# Patient Record
Sex: FEMALE | Race: BLACK OR AFRICAN AMERICAN | NOT HISPANIC OR LATINO | URBAN - METROPOLITAN AREA
[De-identification: names, ages, dates, MRNs, and addresses within clinical notes are randomized per-mention and may not be internally consistent; named-entity substitution may affect disease eponyms.]

---

## 2020-11-05 ENCOUNTER — INPATIENT (INPATIENT)
Facility: HOSPITAL | Age: 80
LOS: 25 days | Discharge: HOME HEALTH | End: 2020-12-01
Attending: INTERNAL MEDICINE | Admitting: INTERNAL MEDICINE
Payer: MEDICARE

## 2020-11-05 VITALS
SYSTOLIC BLOOD PRESSURE: 146 MMHG | OXYGEN SATURATION: 95 % | DIASTOLIC BLOOD PRESSURE: 79 MMHG | RESPIRATION RATE: 16 BRPM | WEIGHT: 110.01 LBS | TEMPERATURE: 98 F | HEART RATE: 105 BPM | HEIGHT: 64 IN

## 2020-11-05 DIAGNOSIS — I21.4 NON-ST ELEVATION (NSTEMI) MYOCARDIAL INFARCTION: ICD-10-CM

## 2020-11-05 DIAGNOSIS — I10 ESSENTIAL (PRIMARY) HYPERTENSION: ICD-10-CM

## 2020-11-05 DIAGNOSIS — R41.82 ALTERED MENTAL STATUS, UNSPECIFIED: ICD-10-CM

## 2020-11-05 DIAGNOSIS — E78.5 HYPERLIPIDEMIA, UNSPECIFIED: ICD-10-CM

## 2020-11-05 DIAGNOSIS — F17.210 NICOTINE DEPENDENCE, CIGARETTES, UNCOMPLICATED: ICD-10-CM

## 2020-11-05 DIAGNOSIS — E11.9 TYPE 2 DIABETES MELLITUS WITHOUT COMPLICATIONS: ICD-10-CM

## 2020-11-05 DIAGNOSIS — I50.21 ACUTE SYSTOLIC (CONGESTIVE) HEART FAILURE: ICD-10-CM

## 2020-11-05 DIAGNOSIS — I63.9 CEREBRAL INFARCTION, UNSPECIFIED: ICD-10-CM

## 2020-11-05 DIAGNOSIS — G93.41 METABOLIC ENCEPHALOPATHY: ICD-10-CM

## 2020-11-05 DIAGNOSIS — I11.0 HYPERTENSIVE HEART DISEASE WITH HEART FAILURE: ICD-10-CM

## 2020-11-05 DIAGNOSIS — F03.90 UNSPECIFIED DEMENTIA WITHOUT BEHAVIORAL DISTURBANCE: ICD-10-CM

## 2020-11-05 DIAGNOSIS — E04.2 NONTOXIC MULTINODULAR GOITER: ICD-10-CM

## 2020-11-05 DIAGNOSIS — I44.7 LEFT BUNDLE-BRANCH BLOCK, UNSPECIFIED: ICD-10-CM

## 2020-11-05 DIAGNOSIS — E44.0 MODERATE PROTEIN-CALORIE MALNUTRITION: ICD-10-CM

## 2020-11-05 DIAGNOSIS — D64.9 ANEMIA, UNSPECIFIED: ICD-10-CM

## 2020-11-05 LAB
ALBUMIN SERPL ELPH-MCNC: 4 G/DL — SIGNIFICANT CHANGE UP (ref 3.3–5)
ALP SERPL-CCNC: 55 U/L — SIGNIFICANT CHANGE UP (ref 40–120)
ALT FLD-CCNC: 25 U/L — SIGNIFICANT CHANGE UP (ref 12–78)
ANION GAP SERPL CALC-SCNC: 9 MMOL/L — SIGNIFICANT CHANGE UP (ref 5–17)
APPEARANCE UR: CLEAR — SIGNIFICANT CHANGE UP
APTT BLD: 25.3 SEC — LOW (ref 27.5–35.5)
APTT BLD: 69.4 SEC — HIGH (ref 27.5–35.5)
AST SERPL-CCNC: 114 U/L — HIGH (ref 15–37)
BACTERIA # UR AUTO: ABNORMAL
BASOPHILS # BLD AUTO: 0.07 K/UL — SIGNIFICANT CHANGE UP (ref 0–0.2)
BASOPHILS NFR BLD AUTO: 1.1 % — SIGNIFICANT CHANGE UP (ref 0–2)
BILIRUB SERPL-MCNC: 0.8 MG/DL — SIGNIFICANT CHANGE UP (ref 0.2–1.2)
BILIRUB UR-MCNC: NEGATIVE — SIGNIFICANT CHANGE UP
BUN SERPL-MCNC: 16 MG/DL — SIGNIFICANT CHANGE UP (ref 7–23)
CALCIUM SERPL-MCNC: 9.3 MG/DL — SIGNIFICANT CHANGE UP (ref 8.5–10.1)
CHLORIDE SERPL-SCNC: 106 MMOL/L — SIGNIFICANT CHANGE UP (ref 96–108)
CK MB BLD-MCNC: 2.3 % — SIGNIFICANT CHANGE UP (ref 0–3.5)
CK MB CFR SERPL CALC: 14.1 NG/ML — HIGH (ref 0.5–3.6)
CK SERPL-CCNC: 623 U/L — HIGH (ref 26–192)
CO2 SERPL-SCNC: 25 MMOL/L — SIGNIFICANT CHANGE UP (ref 22–31)
COLOR SPEC: YELLOW — SIGNIFICANT CHANGE UP
CREAT SERPL-MCNC: 0.94 MG/DL — SIGNIFICANT CHANGE UP (ref 0.5–1.3)
DIFF PNL FLD: ABNORMAL
EOSINOPHIL # BLD AUTO: 0 K/UL — SIGNIFICANT CHANGE UP (ref 0–0.5)
EOSINOPHIL NFR BLD AUTO: 0 % — SIGNIFICANT CHANGE UP (ref 0–6)
EPI CELLS # UR: SIGNIFICANT CHANGE UP
GLUCOSE SERPL-MCNC: 106 MG/DL — HIGH (ref 70–99)
GLUCOSE UR QL: NEGATIVE MG/DL — SIGNIFICANT CHANGE UP
HCT VFR BLD CALC: 31.6 % — LOW (ref 34.5–45)
HCT VFR BLD CALC: 36.3 % — SIGNIFICANT CHANGE UP (ref 34.5–45)
HGB BLD-MCNC: 11.3 G/DL — LOW (ref 11.5–15.5)
HGB BLD-MCNC: 9.8 G/DL — LOW (ref 11.5–15.5)
IMM GRANULOCYTES NFR BLD AUTO: 0.3 % — SIGNIFICANT CHANGE UP (ref 0–1.5)
INR BLD: 1.14 RATIO — SIGNIFICANT CHANGE UP (ref 0.88–1.16)
KETONES UR-MCNC: ABNORMAL
LEUKOCYTE ESTERASE UR-ACNC: ABNORMAL
LYMPHOCYTES # BLD AUTO: 1.44 K/UL — SIGNIFICANT CHANGE UP (ref 1–3.3)
LYMPHOCYTES # BLD AUTO: 22 % — SIGNIFICANT CHANGE UP (ref 13–44)
MCHC RBC-ENTMCNC: 24.2 PG — LOW (ref 27–34)
MCHC RBC-ENTMCNC: 24.3 PG — LOW (ref 27–34)
MCHC RBC-ENTMCNC: 31 GM/DL — LOW (ref 32–36)
MCHC RBC-ENTMCNC: 31.1 GM/DL — LOW (ref 32–36)
MCV RBC AUTO: 77.9 FL — LOW (ref 80–100)
MCV RBC AUTO: 78.2 FL — LOW (ref 80–100)
MONOCYTES # BLD AUTO: 0.55 K/UL — SIGNIFICANT CHANGE UP (ref 0–0.9)
MONOCYTES NFR BLD AUTO: 8.4 % — SIGNIFICANT CHANGE UP (ref 2–14)
NEUTROPHILS # BLD AUTO: 4.46 K/UL — SIGNIFICANT CHANGE UP (ref 1.8–7.4)
NEUTROPHILS NFR BLD AUTO: 68.2 % — SIGNIFICANT CHANGE UP (ref 43–77)
NITRITE UR-MCNC: NEGATIVE — SIGNIFICANT CHANGE UP
NRBC # BLD: 0 /100 WBCS — SIGNIFICANT CHANGE UP (ref 0–0)
NRBC # BLD: 0 /100 WBCS — SIGNIFICANT CHANGE UP (ref 0–0)
PH UR: 5 — SIGNIFICANT CHANGE UP (ref 5–8)
PLATELET # BLD AUTO: 159 K/UL — SIGNIFICANT CHANGE UP (ref 150–400)
PLATELET # BLD AUTO: 182 K/UL — SIGNIFICANT CHANGE UP (ref 150–400)
POTASSIUM SERPL-MCNC: 4.1 MMOL/L — SIGNIFICANT CHANGE UP (ref 3.5–5.3)
POTASSIUM SERPL-SCNC: 4.1 MMOL/L — SIGNIFICANT CHANGE UP (ref 3.5–5.3)
PROT SERPL-MCNC: 7.2 GM/DL — SIGNIFICANT CHANGE UP (ref 6–8.3)
PROT UR-MCNC: 30 MG/DL
PROTHROM AB SERPL-ACNC: 13.1 SEC — SIGNIFICANT CHANGE UP (ref 10.6–13.6)
RBC # BLD: 4.04 M/UL — SIGNIFICANT CHANGE UP (ref 3.8–5.2)
RBC # BLD: 4.66 M/UL — SIGNIFICANT CHANGE UP (ref 3.8–5.2)
RBC # FLD: 13.7 % — SIGNIFICANT CHANGE UP (ref 10.3–14.5)
RBC # FLD: 13.9 % — SIGNIFICANT CHANGE UP (ref 10.3–14.5)
RBC CASTS # UR COMP ASSIST: SIGNIFICANT CHANGE UP /HPF (ref 0–4)
SARS-COV-2 RNA SPEC QL NAA+PROBE: SIGNIFICANT CHANGE UP
SODIUM SERPL-SCNC: 140 MMOL/L — SIGNIFICANT CHANGE UP (ref 135–145)
SP GR SPEC: 1.02 — SIGNIFICANT CHANGE UP (ref 1.01–1.02)
TROPONIN I SERPL-MCNC: 24 NG/ML — HIGH (ref 0.01–0.04)
TROPONIN I SERPL-MCNC: 24.2 NG/ML — HIGH (ref 0.01–0.04)
TROPONIN I SERPL-MCNC: 27.2 NG/ML — HIGH (ref 0.01–0.04)
URATE CRY FLD QL MICRO: ABNORMAL
UROBILINOGEN FLD QL: 1 MG/DL
WBC # BLD: 5.86 K/UL — SIGNIFICANT CHANGE UP (ref 3.8–10.5)
WBC # BLD: 6.54 K/UL — SIGNIFICANT CHANGE UP (ref 3.8–10.5)
WBC # FLD AUTO: 5.86 K/UL — SIGNIFICANT CHANGE UP (ref 3.8–10.5)
WBC # FLD AUTO: 6.54 K/UL — SIGNIFICANT CHANGE UP (ref 3.8–10.5)
WBC UR QL: SIGNIFICANT CHANGE UP

## 2020-11-05 PROCEDURE — 99285 EMERGENCY DEPT VISIT HI MDM: CPT

## 2020-11-05 PROCEDURE — 71045 X-RAY EXAM CHEST 1 VIEW: CPT | Mod: 26

## 2020-11-05 PROCEDURE — 99223 1ST HOSP IP/OBS HIGH 75: CPT

## 2020-11-05 PROCEDURE — 93010 ELECTROCARDIOGRAM REPORT: CPT | Mod: 77

## 2020-11-05 PROCEDURE — 93010 ELECTROCARDIOGRAM REPORT: CPT

## 2020-11-05 PROCEDURE — 70450 CT HEAD/BRAIN W/O DYE: CPT | Mod: 26,MH

## 2020-11-05 PROCEDURE — 93306 TTE W/DOPPLER COMPLETE: CPT | Mod: 26

## 2020-11-05 RX ORDER — ASPIRIN/CALCIUM CARB/MAGNESIUM 324 MG
325 TABLET ORAL ONCE
Refills: 0 | Status: DISCONTINUED | OUTPATIENT
Start: 2020-11-05 | End: 2020-11-05

## 2020-11-05 RX ORDER — HEPARIN SODIUM 5000 [USP'U]/ML
2900 INJECTION INTRAVENOUS; SUBCUTANEOUS EVERY 6 HOURS
Refills: 0 | Status: DISCONTINUED | OUTPATIENT
Start: 2020-11-05 | End: 2020-11-05

## 2020-11-05 RX ORDER — ERGOCALCIFEROL 1.25 MG/1
1 CAPSULE ORAL
Qty: 0 | Refills: 0 | DISCHARGE

## 2020-11-05 RX ORDER — CLOPIDOGREL BISULFATE 75 MG/1
300 TABLET, FILM COATED ORAL ONCE
Refills: 0 | Status: COMPLETED | OUTPATIENT
Start: 2020-11-05 | End: 2020-11-05

## 2020-11-05 RX ORDER — FUROSEMIDE 40 MG
40 TABLET ORAL DAILY
Refills: 0 | Status: DISCONTINUED | OUTPATIENT
Start: 2020-11-05 | End: 2020-12-01

## 2020-11-05 RX ORDER — HEPARIN SODIUM 5000 [USP'U]/ML
3200 INJECTION INTRAVENOUS; SUBCUTANEOUS EVERY 6 HOURS
Refills: 0 | Status: DISCONTINUED | OUTPATIENT
Start: 2020-11-05 | End: 2020-11-10

## 2020-11-05 RX ORDER — MEMANTINE HYDROCHLORIDE 10 MG/1
10 TABLET ORAL
Refills: 0 | Status: DISCONTINUED | OUTPATIENT
Start: 2020-11-05 | End: 2020-12-01

## 2020-11-05 RX ORDER — CARVEDILOL PHOSPHATE 80 MG/1
3.12 CAPSULE, EXTENDED RELEASE ORAL EVERY 12 HOURS
Refills: 0 | Status: DISCONTINUED | OUTPATIENT
Start: 2020-11-05 | End: 2020-11-06

## 2020-11-05 RX ORDER — CLOPIDOGREL BISULFATE 75 MG/1
75 TABLET, FILM COATED ORAL DAILY
Refills: 0 | Status: DISCONTINUED | OUTPATIENT
Start: 2020-11-05 | End: 2020-11-10

## 2020-11-05 RX ORDER — ATORVASTATIN CALCIUM 80 MG/1
40 TABLET, FILM COATED ORAL AT BEDTIME
Refills: 0 | Status: DISCONTINUED | OUTPATIENT
Start: 2020-11-05 | End: 2020-12-01

## 2020-11-05 RX ORDER — HEPARIN SODIUM 5000 [USP'U]/ML
2900 INJECTION INTRAVENOUS; SUBCUTANEOUS ONCE
Refills: 0 | Status: COMPLETED | OUTPATIENT
Start: 2020-11-05 | End: 2020-11-05

## 2020-11-05 RX ORDER — DONEPEZIL HYDROCHLORIDE 10 MG/1
5 TABLET, FILM COATED ORAL AT BEDTIME
Refills: 0 | Status: DISCONTINUED | OUTPATIENT
Start: 2020-11-05 | End: 2020-12-01

## 2020-11-05 RX ORDER — HEPARIN SODIUM 5000 [USP'U]/ML
600 INJECTION INTRAVENOUS; SUBCUTANEOUS
Qty: 25000 | Refills: 0 | Status: DISCONTINUED | OUTPATIENT
Start: 2020-11-05 | End: 2020-11-10

## 2020-11-05 RX ORDER — LISINOPRIL 2.5 MG/1
5 TABLET ORAL DAILY
Refills: 0 | Status: DISCONTINUED | OUTPATIENT
Start: 2020-11-05 | End: 2020-11-19

## 2020-11-05 RX ORDER — ASPIRIN/CALCIUM CARB/MAGNESIUM 324 MG
325 TABLET ORAL DAILY
Refills: 0 | Status: DISCONTINUED | OUTPATIENT
Start: 2020-11-05 | End: 2020-11-09

## 2020-11-05 RX ORDER — HEPARIN SODIUM 5000 [USP'U]/ML
INJECTION INTRAVENOUS; SUBCUTANEOUS
Qty: 25000 | Refills: 0 | Status: DISCONTINUED | OUTPATIENT
Start: 2020-11-05 | End: 2020-11-05

## 2020-11-05 RX ADMIN — Medication 1 TABLET(S): at 17:33

## 2020-11-05 RX ADMIN — HEPARIN SODIUM 2900 UNIT(S): 5000 INJECTION INTRAVENOUS; SUBCUTANEOUS at 15:24

## 2020-11-05 RX ADMIN — MEMANTINE HYDROCHLORIDE 10 MILLIGRAM(S): 10 TABLET ORAL at 17:33

## 2020-11-05 RX ADMIN — DONEPEZIL HYDROCHLORIDE 5 MILLIGRAM(S): 10 TABLET, FILM COATED ORAL at 21:20

## 2020-11-05 RX ADMIN — HEPARIN SODIUM 600 UNIT(S)/HR: 5000 INJECTION INTRAVENOUS; SUBCUTANEOUS at 15:25

## 2020-11-05 RX ADMIN — CARVEDILOL PHOSPHATE 3.12 MILLIGRAM(S): 80 CAPSULE, EXTENDED RELEASE ORAL at 17:33

## 2020-11-05 RX ADMIN — Medication 325 MILLIGRAM(S): at 15:29

## 2020-11-05 RX ADMIN — ATORVASTATIN CALCIUM 40 MILLIGRAM(S): 80 TABLET, FILM COATED ORAL at 21:20

## 2020-11-05 RX ADMIN — CLOPIDOGREL BISULFATE 300 MILLIGRAM(S): 75 TABLET, FILM COATED ORAL at 15:29

## 2020-11-05 RX ADMIN — HEPARIN SODIUM 600 UNIT(S)/HR: 5000 INJECTION INTRAVENOUS; SUBCUTANEOUS at 22:30

## 2020-11-05 NOTE — ED PROVIDER NOTE - OBJECTIVE STATEMENT
81yo female with unknown pmh found [place] highway. Pt gave name and . Pt is AOX2, shivering but otherwise deneis any complaints. Police currently attempting to look for family.    No fever/chills, No photophobia/eye pain/changes in vision, No ear pain/sore throat/dysphagia, No chest pain/palpitations, no SOB/cough, no wheeze/stridor, No abdominal pain, No N/V/D, no dysuria/frequency/discharge, No neck/back pain, no rash, no changes in neurological status/function. 81yo female with unknown pmh found walking [place] highway. Pt gave name and . Pt is AOX2, shivering but otherwise deneis any complaints. Police currently attempting to look for family. Pt initially reports she lives with mom, then states she has two sons, but then switches it to daughter.     No fever/chills, No photophobia/eye pain/changes in vision, No ear pain/sore throat/dysphagia, No chest pain/palpitations, no SOB/cough, no wheeze/stridor, No abdominal pain, No N/V/D, no dysuria/frequency/discharge, No neck/back pain, no rash, no changes in neurological status/function.

## 2020-11-05 NOTE — H&P ADULT - ASSESSMENT
80 years old female with history of dementia and Hypertension and diabetes wfound with wandering with cerebrovascular accident seeen and lbbb with very elevated trop

## 2020-11-05 NOTE — ED PROVIDER NOTE - CARE PLAN
Principal Discharge DX:	Altered mental status  Secondary Diagnosis:	CVA (cerebral vascular accident)   Principal Discharge DX:	Altered mental status  Secondary Diagnosis:	CVA (cerebral vascular accident)  Secondary Diagnosis:	Elevated troponin

## 2020-11-05 NOTE — ED ADULT NURSE NOTE - CHPI ED NUR SYMPTOMS NEG
no loss of consciousness/no dizziness/no change in level of consciousness/no fever/no nausea/no blurred vision

## 2020-11-05 NOTE — ED PROVIDER NOTE - CLINICAL SUMMARY MEDICAL DECISION MAKING FREE TEXT BOX
Pt likely with history of dementia. d/w police who were able to get pt's address and in touch with a ? fellow [employer] person who was to come. However no one came. Number in chart not working. Pt unabel to corroborate dementia history. CTH with possible subacute vs chronic stroke, will admit. sherin fraser. Pt likely with history of dementia. d/w police who were able to get pt's address and in touch with a ? fellow Mu-ism person who was to come. However no one came. Number in chart not working. Pt unable to corroborate any history or dementia. EKG initially with LBBB, unknown prior. CTH performed, showing possible subacute vs chronic stroke. Trop added, and noted to be 24. Stat CK and rpt trop ordered. pt comfortable, denies any symptoms or CP. will admit. sherin fraser. Pt likely with history of dementia. d/w police who were able to get pt's address and in touch with a ? fellow Mandaen person who was to come. However no one came. Number in chart not working. Pt unable to corroborate any history or dementia. EKG initially with LBBB, unknown prior. CTH performed, showing possible subacute vs chronic stroke. Trop added, and noted to be 24. Stat CK and rpt trop ordered similar, rpt EKG stable. pt comfortable, no pain, denies any symptoms or CP. will admit. sherin fraser.

## 2020-11-05 NOTE — H&P ADULT - NSHPPHYSICALEXAM_GEN_ALL_CORE
ICU Vital Signs Last 24 Hrs  T(C): 36.6 (05 Nov 2020 11:26), Max: 36.6 (05 Nov 2020 11:26)  T(F): 97.9 (05 Nov 2020 11:26), Max: 97.9 (05 Nov 2020 11:26)  HR: 89 (05 Nov 2020 11:26) (89 - 105)  BP: 117/61 (05 Nov 2020 11:26) (117/61 - 146/79)  BP(mean): --  ABP: --  ABP(mean): --  RR: 17 (05 Nov 2020 11:26) (16 - 17)  SpO2: 100% (05 Nov 2020 11:26) (95% - 100%)  GENERAL: NAD well-developed  HEAD:  Atraumatic, Normocephalic  EYES: EOMI, PERRLA, conjunctiva and sclera clear  ENMT: No tonsillar erythema, exudates, or enlargement; Moist mucous membranes, Good dentition, No lesions  NECK: Supple, No JVD, Normal thyroid  NERVOUS SYSTEM:  Alert & Oriented X3, Good concentration; Motor Strength 5/5 B/L upper and lower extremities; DTRs 2+ intact and symmetric  CHEST/LUNG: Clear to percussion bilaterally; No rales, rhonchi, wheezing, or rubs  HEART: Regular rate and rhythm; No murmurs, rubs, or gallops  ABDOMEN: Soft, Nontender, Nondistended; Bowel sounds present  EXTREMITIES:  2+ Peripheral Pulses, No clubbing, cyanosis, or edema  LYMPH: No lymphadenopathy   SKIN: No rashes or lesions

## 2020-11-05 NOTE — ED ADULT NURSE NOTE - OBJECTIVE STATEMENT
Pt brought in by PD with the c/o pt found wondering on the highway confuse and c/o being cold. As per Md's orders IV jo placed blood specimen obtained and sent to the lab. Pt alert and confuse/disoriented PD at bedside with pt and pt is on enhance watch. Nursing care ongoing and safety maintained.

## 2020-11-05 NOTE — H&P ADULT - HISTORY OF PRESENT ILLNESS
79yo female with unknown pmh [place] highway. Pt gave name and . Pt is AOX2, shivering but otherwise deneis any complaints. Police currently attempting to look for family.    	No fever/chills, No photophobia/eye pain/changes in vision, No ear pain/sore throat/dysphagia, No chest pain/palpitations, no SOB/cough, no wheeze/stridor, No abdominal pain, No N/V/D, no dysuria/frequency/discharge, No neck/back pain, no rash, no changes in neurological status/function.

## 2020-11-05 NOTE — ED PROVIDER NOTE - PHYSICAL EXAMINATION
Gen: Alert, Well appearing. NAD  , well kempt  Head: NC, AT, PERRL, normal lids/conjunctiva   ENT: Bilateral TM WNL, patent oropharynx without erythema/exudate, uvula midline  Neck: supple, no tenderness/meningismus  Pulm: Bilateral clear BS, normal resp effort  CV: RRR, no M/R/G, +dist pulses   Abd: soft, NT/ND, +BS, no guarding/rebound tenderness  Mskel: no edema/erythema/cyanosis   Skin: no rash, no bruising  Neuro: AAOx3, no sensory/motor deficits, CN 2-12 intact Gen: Alert, Well appearing. NAD  , well kempt  Head: NC, AT, PERRL, normal lids/conjunctiva   ENT: Bilateral TM WNL, patent oropharynx without erythema/exudate, uvula midline  Neck: supple, no tenderness/meningismus  Pulm: Bilateral clear BS, normal resp effort  CV: RRR, no M/R/G, +dist pulses   Abd: soft, NT/ND, +BS, no guarding/rebound tenderness  Mskel: no edema/erythema/cyanosis   Skin: no rash, no bruising  Neuro: AAOx2, no sensory/motor deficits, CN 2-12 intact.

## 2020-11-06 LAB
ANION GAP SERPL CALC-SCNC: 5 MMOL/L — SIGNIFICANT CHANGE UP (ref 5–17)
ANION GAP SERPL CALC-SCNC: 9 MMOL/L — SIGNIFICANT CHANGE UP (ref 5–17)
APTT BLD: 70.9 SEC — HIGH (ref 27.5–35.5)
APTT BLD: 71.4 SEC — HIGH (ref 27.5–35.5)
BUN SERPL-MCNC: 16 MG/DL — SIGNIFICANT CHANGE UP (ref 7–23)
BUN SERPL-MCNC: 18 MG/DL — SIGNIFICANT CHANGE UP (ref 7–23)
CALCIUM SERPL-MCNC: 8.4 MG/DL — LOW (ref 8.5–10.1)
CALCIUM SERPL-MCNC: 8.4 MG/DL — LOW (ref 8.5–10.1)
CHLORIDE SERPL-SCNC: 108 MMOL/L — SIGNIFICANT CHANGE UP (ref 96–108)
CHLORIDE SERPL-SCNC: 110 MMOL/L — HIGH (ref 96–108)
CO2 SERPL-SCNC: 24 MMOL/L — SIGNIFICANT CHANGE UP (ref 22–31)
CO2 SERPL-SCNC: 27 MMOL/L — SIGNIFICANT CHANGE UP (ref 22–31)
CREAT SERPL-MCNC: 0.81 MG/DL — SIGNIFICANT CHANGE UP (ref 0.5–1.3)
CREAT SERPL-MCNC: 0.89 MG/DL — SIGNIFICANT CHANGE UP (ref 0.5–1.3)
GLUCOSE SERPL-MCNC: 107 MG/DL — HIGH (ref 70–99)
GLUCOSE SERPL-MCNC: 93 MG/DL — SIGNIFICANT CHANGE UP (ref 70–99)
HCT VFR BLD CALC: 30.7 % — LOW (ref 34.5–45)
HCT VFR BLD CALC: 32.1 % — LOW (ref 34.5–45)
HGB BLD-MCNC: 10 G/DL — LOW (ref 11.5–15.5)
HGB BLD-MCNC: 9.7 G/DL — LOW (ref 11.5–15.5)
MAGNESIUM SERPL-MCNC: 2.1 MG/DL — SIGNIFICANT CHANGE UP (ref 1.6–2.6)
MCHC RBC-ENTMCNC: 24 PG — LOW (ref 27–34)
MCHC RBC-ENTMCNC: 24.4 PG — LOW (ref 27–34)
MCHC RBC-ENTMCNC: 31.2 GM/DL — LOW (ref 32–36)
MCHC RBC-ENTMCNC: 31.6 GM/DL — LOW (ref 32–36)
MCV RBC AUTO: 77 FL — LOW (ref 80–100)
MCV RBC AUTO: 77.1 FL — LOW (ref 80–100)
NRBC # BLD: 0 /100 WBCS — SIGNIFICANT CHANGE UP (ref 0–0)
NRBC # BLD: 0 /100 WBCS — SIGNIFICANT CHANGE UP (ref 0–0)
PHOSPHATE SERPL-MCNC: 2.7 MG/DL — SIGNIFICANT CHANGE UP (ref 2.5–4.5)
PLATELET # BLD AUTO: 164 K/UL — SIGNIFICANT CHANGE UP (ref 150–400)
PLATELET # BLD AUTO: 166 K/UL — SIGNIFICANT CHANGE UP (ref 150–400)
POTASSIUM SERPL-MCNC: 2.9 MMOL/L — CRITICAL LOW (ref 3.5–5.3)
POTASSIUM SERPL-MCNC: 3.8 MMOL/L — SIGNIFICANT CHANGE UP (ref 3.5–5.3)
POTASSIUM SERPL-SCNC: 2.9 MMOL/L — CRITICAL LOW (ref 3.5–5.3)
POTASSIUM SERPL-SCNC: 3.8 MMOL/L — SIGNIFICANT CHANGE UP (ref 3.5–5.3)
RBC # BLD: 3.98 M/UL — SIGNIFICANT CHANGE UP (ref 3.8–5.2)
RBC # BLD: 4.17 M/UL — SIGNIFICANT CHANGE UP (ref 3.8–5.2)
RBC # FLD: 13.6 % — SIGNIFICANT CHANGE UP (ref 10.3–14.5)
RBC # FLD: 13.8 % — SIGNIFICANT CHANGE UP (ref 10.3–14.5)
SODIUM SERPL-SCNC: 140 MMOL/L — SIGNIFICANT CHANGE UP (ref 135–145)
SODIUM SERPL-SCNC: 143 MMOL/L — SIGNIFICANT CHANGE UP (ref 135–145)
TROPONIN I SERPL-MCNC: 14.6 NG/ML — HIGH (ref 0.01–0.04)
TROPONIN I SERPL-MCNC: 21.9 NG/ML — HIGH (ref 0.01–0.04)
WBC # BLD: 5.69 K/UL — SIGNIFICANT CHANGE UP (ref 3.8–10.5)
WBC # BLD: 5.87 K/UL — SIGNIFICANT CHANGE UP (ref 3.8–10.5)
WBC # FLD AUTO: 5.69 K/UL — SIGNIFICANT CHANGE UP (ref 3.8–10.5)
WBC # FLD AUTO: 5.87 K/UL — SIGNIFICANT CHANGE UP (ref 3.8–10.5)

## 2020-11-06 PROCEDURE — 99233 SBSQ HOSP IP/OBS HIGH 50: CPT

## 2020-11-06 PROCEDURE — 99223 1ST HOSP IP/OBS HIGH 75: CPT

## 2020-11-06 PROCEDURE — 93010 ELECTROCARDIOGRAM REPORT: CPT

## 2020-11-06 RX ORDER — CARVEDILOL PHOSPHATE 80 MG/1
6.25 CAPSULE, EXTENDED RELEASE ORAL EVERY 12 HOURS
Refills: 0 | Status: DISCONTINUED | OUTPATIENT
Start: 2020-11-06 | End: 2020-11-07

## 2020-11-06 RX ORDER — POTASSIUM CHLORIDE 20 MEQ
40 PACKET (EA) ORAL ONCE
Refills: 0 | Status: COMPLETED | OUTPATIENT
Start: 2020-11-06 | End: 2020-11-06

## 2020-11-06 RX ORDER — POTASSIUM CHLORIDE 20 MEQ
10 PACKET (EA) ORAL
Refills: 0 | Status: COMPLETED | OUTPATIENT
Start: 2020-11-06 | End: 2020-11-06

## 2020-11-06 RX ORDER — NICOTINE POLACRILEX 2 MG
1 GUM BUCCAL DAILY
Refills: 0 | Status: DISCONTINUED | OUTPATIENT
Start: 2020-11-06 | End: 2020-12-01

## 2020-11-06 RX ADMIN — ATORVASTATIN CALCIUM 40 MILLIGRAM(S): 80 TABLET, FILM COATED ORAL at 21:35

## 2020-11-06 RX ADMIN — Medication 325 MILLIGRAM(S): at 11:33

## 2020-11-06 RX ADMIN — HEPARIN SODIUM 600 UNIT(S)/HR: 5000 INJECTION INTRAVENOUS; SUBCUTANEOUS at 05:08

## 2020-11-06 RX ADMIN — CLOPIDOGREL BISULFATE 75 MILLIGRAM(S): 75 TABLET, FILM COATED ORAL at 11:34

## 2020-11-06 RX ADMIN — CARVEDILOL PHOSPHATE 3.12 MILLIGRAM(S): 80 CAPSULE, EXTENDED RELEASE ORAL at 05:10

## 2020-11-06 RX ADMIN — MEMANTINE HYDROCHLORIDE 10 MILLIGRAM(S): 10 TABLET ORAL at 05:10

## 2020-11-06 RX ADMIN — CARVEDILOL PHOSPHATE 6.25 MILLIGRAM(S): 80 CAPSULE, EXTENDED RELEASE ORAL at 18:02

## 2020-11-06 RX ADMIN — Medication 100 MILLIEQUIVALENT(S): at 11:45

## 2020-11-06 RX ADMIN — Medication 100 MILLIEQUIVALENT(S): at 10:33

## 2020-11-06 RX ADMIN — Medication 1 TABLET(S): at 18:02

## 2020-11-06 RX ADMIN — Medication 100 MILLIEQUIVALENT(S): at 14:35

## 2020-11-06 RX ADMIN — LISINOPRIL 5 MILLIGRAM(S): 2.5 TABLET ORAL at 05:10

## 2020-11-06 RX ADMIN — Medication 40 MILLIGRAM(S): at 05:10

## 2020-11-06 RX ADMIN — Medication 1 TABLET(S): at 05:11

## 2020-11-06 RX ADMIN — Medication 40 MILLIEQUIVALENT(S): at 04:50

## 2020-11-06 RX ADMIN — Medication 1 PATCH: at 17:58

## 2020-11-06 RX ADMIN — MEMANTINE HYDROCHLORIDE 10 MILLIGRAM(S): 10 TABLET ORAL at 18:02

## 2020-11-06 RX ADMIN — DONEPEZIL HYDROCHLORIDE 5 MILLIGRAM(S): 10 TABLET, FILM COATED ORAL at 21:35

## 2020-11-06 RX ADMIN — HEPARIN SODIUM 600 UNIT(S)/HR: 5000 INJECTION INTRAVENOUS; SUBCUTANEOUS at 11:56

## 2020-11-06 NOTE — PROGRESS NOTE ADULT - SUBJECTIVE AND OBJECTIVE BOX
Patient is a 80y old  Female who presents with a chief complaint of Altered Mental Status (2020 14:02)    HPI:   81yo female with unknown pmh found [place] highway. Pt gave name and . Pt is AOX2, shivering but otherwise deneis any complaints. Police currently attempting to look for family.    	No fever/chills, No photophobia/eye pain/changes in vision, No ear pain/sore throat/dysphagia, No chest pain/palpitations, no SOB/cough, no wheeze/stridor, No abdominal pain, No N/V/D, no dysuria/frequency/discharge, No neck/back pain, no rash, no changes in neurological status/function.   (2020 14:02)    SUBJECTIVE & OBJECTIVE: Pt seen and examined at bedside.   PHYSICAL EXAM:  ICU Vital Signs Last 24 Hrs  T(C): 36.6 (2020 04:58), Max: 37 (2020 18:10)  T(F): 97.8 (2020 04:58), Max: 98.6 (2020 18:10)  HR: 64 (2020 04:58) (61 - 89)  BP: 137/74 (2020 04:58) (112/68 - 137/74)  BP(mean): --  ABP: --  ABP(mean): --  RR: 17 (2020 04:58) (16 - 18)  SpO2: 96% (2020 04:58) (94% - 100%)  Daily Height in cm: 162.56 (2020 18:10)    Daily Weight in k.7 (2020 04:58)  I&O's Detail    2020 07:01  -  2020 07:00  --------------------------------------------------------  IN:    Heparin Infusion: 72 mL    Oral Fluid: 912 mL  Total IN: 984 mL    OUT:  Total OUT: 0 mL    Total NET: 984 mL        GENERAL: NAD, well-groomed, well-developed  HEAD:  Atraumatic, Normocephalic  EYES: EOMI, PERRLA, conjunctiva and sclera clear  ENMT: Moist mucous membranes  NECK: Supple, No JVD  NERVOUS SYSTEM:  Alert & Oriented X3, Motor Strength 5/5 B/L upper and lower extremities; DTRs 2+ intact and symmetric  CHEST/LUNG: Clear to auscultation bilaterally; No rales, rhonchi, wheezing, or rubs  HEART: Regular rate and rhythm; No murmurs, rubs, or gallops  ABDOMEN: Soft, Nontender, Nondistended; Bowel sounds present  EXTREMITIES:  2+ Peripheral Pulses, No clubbing, cyanosis, or edema  MEDICATIONS  (STANDING):  aspirin 325 milliGRAM(s) Oral daily  atorvastatin 40 milliGRAM(s) Oral at bedtime  carvedilol 3.125 milliGRAM(s) Oral every 12 hours  clopidogrel Tablet 75 milliGRAM(s) Oral daily  donepezil 5 milliGRAM(s) Oral at bedtime  furosemide    Tablet 40 milliGRAM(s) Oral daily  heparin  Infusion. 600 Unit(s)/Hr (6 mL/Hr) IV Continuous <Continuous>  lisinopril 5 milliGRAM(s) Oral daily  lopinavir 200 mG/ritonavir 50 mG 1 Tablet(s) Oral every 12 hours  memantine 10 milliGRAM(s) Oral two times a day  potassium chloride  10 mEq/100 mL IVPB 10 milliEquivalent(s) IV Intermittent every 1 hour    MEDICATIONS  (PRN):  heparin   Injectable 3200 Unit(s) IV Push every 6 hours PRN For aPTT less than 40    LABS:                        10.0   5.87  )-----------( 164      ( 2020 04:52 )             32.1     11-06    140  |  108  |  18  ----------------------------<  93  2.9<LL>   |  27  |  0.81    Ca    8.4<L>      2020 03:54  Phos  2.7     11-  Mg     2.1     -    TPro  7.2  /  Alb  4.0  /  TBili  0.8  /  DBili  x   /  AST  114<H>  /  ALT  25  /  AlkPhos  55  11-05    PT/INR - ( 2020 14:59 )   PT: 13.1 sec;   INR: 1.14 ratio         PTT - ( 2020 04:52 )  PTT:71.4 sec  Urinalysis Basic - ( 2020 22:42 )    Color: Yellow / Appearance: Clear / S.025 / pH: x  Gluc: x / Ketone: Trace  / Bili: Negative / Urobili: 1 mg/dL   Blood: x / Protein: 30 mg/dL / Nitrite: Negative   Leuk Esterase: Trace / RBC: 0-2 /HPF / WBC 0-2   Sq Epi: x / Non Sq Epi: Few / Bacteria: Few      CAPILLARY BLOOD GLUCOSE          CARDIAC MARKERS ( 2020 03:54 )  21.900 ng/mL / x     / x     / x     / x      CARDIAC MARKERS ( 2020 21:22 )  24.200 ng/mL / x     / x     / x     / x      CARDIAC MARKERS ( 2020 14:59 )  27.200 ng/mL / x     / 623 U/L / x     / 14.1 ng/mL  CARDIAC MARKERS ( 2020 09:03 )  24.000 ng/mL / x     / x     / x     / x            RECENT CULTURES:    RADIOLOGY & ADDITIONAL TESTS:    DVT/GI ppx  Discussed with pt @ bedside

## 2020-11-06 NOTE — DIETITIAN INITIAL EVALUATION ADULT. - OTHER INFO
[place] highway; living situation unknown; reports she lives alone & cooks for herself (?); c dementia. Pt reports she is hungry; ordered sandwich for her + Ensure Enlive. Pt reports UBW is approx 110#. Denies any N/V/C/D or chew/swallowing difficulty.

## 2020-11-06 NOTE — PHYSICAL THERAPY INITIAL EVALUATION ADULT - DISCHARGE DISPOSITION, PT EVAL
pending determination of place of residence prior to admission home w/ home PT/(prior living condition needs to be verified, unable to accurately determine PLOF)

## 2020-11-06 NOTE — PHYSICAL THERAPY INITIAL EVALUATION ADULT - TRANSFER TRAINING, PT EVAL
Pt will independently perform sit to/from stand transfers without LOB using rolling walker by 2 weeks.

## 2020-11-06 NOTE — CHART NOTE - TREATMENT: THE FOLLOWING DIET HAS BEEN RECOMMENDED
Diet, Regular:   Supplement Feeding Modality:  Oral  Ensure Enlive Cans or Servings Per Day:  1       Frequency:  Two Times a day (11-06-20 @ 15:16) [Pending Verification By Attending]    Diet, DASH/TLC:   Sodium & Cholesterol Restricted (11-05-20 @ 14:29) [Active]

## 2020-11-06 NOTE — PROGRESS NOTE ADULT - ASSESSMENT
80 years old female with history of dementia and Hypertension and diabetes wfound with wandering with cerebrovascular accident seeen and lbbb with very elevated trop     Problem/Plan - 1:  ·  Problem: Metabolic encephalopathy.  Plan: continue home meds.   - complete dementia work up  - RPR, TSH, Folic Acid, Vit B12  - MR head     Problem/Plan - 2:  ·  Problem: Anemia  - check occult blood  - hgb on previous admission 11 --> 10  - MCV decreased  - check iron studies     Problem/Plan - 2:  ·  Problem: NSTEMI (non-ST elevated myocardial infarction).    Plan:   - heparin gtt  - check am EKG.  EKG in ED demonstrated LBBB, unsure as to whether this is a new finding.    - check TTE  - troponin     Problem/Plan - 3:  ·  Problem: CVA (cerebral vascular accident).  Plan: continue home meds.     Problem/Plan - 4:  ·  Problem: Dyslipidemia.  Plan: continue home meds.     Problem/Plan - 5:  ·  Problem: Essential hypertension.  Plan: continue home meds.

## 2020-11-06 NOTE — CONSULT NOTE ADULT - ASSESSMENT
Subjective Complaints:      Consult requested by ER doctor:                  Attending:     History of Present Illness:  Chief Complaint/Reason for Admission:  History of Present Illness:  HPI:   81yo female with unknown pmh found [place] highway. Pt gave name and . Pt is AOX2, shivering but otherwise deneis any complaints. Police currently attempting to look for family.    	No fever/chills, No photophobia/eye pain/changes in vision, No ear pain/sore throat/dysphagia, No chest pain/palpitations, no SOB/cough, no wheeze/stridor, No abdominal pain, No N/V/D, no dysuria/frequency/discharge, No neck/back pain, no rash, no changes in neurological status/function.   (2020 14:02)        PAST MEDICAL & SURGICAL HISTORY:  80yFemale    MEDICATIONS  (STANDING):  aspirin 325 milliGRAM(s) Oral daily  atorvastatin 40 milliGRAM(s) Oral at bedtime  carvedilol 6.25 milliGRAM(s) Oral every 12 hours  clopidogrel Tablet 75 milliGRAM(s) Oral daily  donepezil 5 milliGRAM(s) Oral at bedtime  furosemide    Tablet 40 milliGRAM(s) Oral daily  heparin  Infusion. 600 Unit(s)/Hr (6 mL/Hr) IV Continuous <Continuous>  lisinopril 5 milliGRAM(s) Oral daily  lopinavir 200 mG/ritonavir 50 mG 1 Tablet(s) Oral every 12 hours  memantine 10 milliGRAM(s) Oral two times a day  nicotine -   7 mG/24Hr(s) Patch 1 patch Transdermal daily    MEDICATIONS  (PRN):  heparin   Injectable 3200 Unit(s) IV Push every 6 hours PRN For aPTT less than 40      Allergies    No Known Allergies    Intolerances      FAMILY HISTORY:      REVIEW OF SYSTEMS:  General:  No wt loss, fevers, chills, night sweats  Eyes:  Good vision, no reported pain  ENT:  No sore throat, pain, runny nose, dysphagia  CV:  No pain, palpitatioins, hypo/hypertension  Resp:  No dyspnea, cough, tachypnea, wheezing  GI:  No pain, nausea, vomiting, diarrhea, constipatiion  :  No pain, bleeding, incontinence, nocturia  Muscle:  No pain, weakness  Breast:  No pain, abscess, mass, discharge  Neuro:  No weakness, tingling, memory problems  Psych:  No fatigue, insomnia, mood problems, depression  Endocrine:  No polyuria, polydypsia, cold/heat intolerance  Heme:  No petechiae, ecchymosis, easy bruisability  Skin:  No rash, tattoos, scars, edema      Vital Signs Last 24 Hrs  T(C): 36.6 (2020 17:47), Max: 36.8 (2020 23:29)  T(F): 97.8 (2020 17:47), Max: 98.2 (2020 23:29)  HR: 54 (2020 17:47) (54 - 78)  BP: 111/62 (2020 17:47) (111/62 - 137/74)  BP(mean): --  RR: 18 (2020 17:47) (17 - 18)  SpO2: 94% (2020 17:47) (94% - 96%)    GENERAL PHYSICAL EXAM:  General:  Appears stated age, well-groomed, well-nourished, no distress  HEENT:  NC/AT, patent nares w/ pink mucosa, OP clear w/o lesions, PERRL, EOMI, conjunctivae clear, no thyromegaly, nodules, adenopathy, no JVD  Chest:  Full & symmetric excursion, no increased effort, breath sounds clear  Cardiovascular:  Regular rhythm, S1, S2, no murmur/rub/S3/S4, no carotid/femoral/abdominal bruit, radial/pedal pulses 2+, no edema  Abdomen:  Soft, non-tender, non-distended, normoactive bowel sounds, no HSM  Extremities:  Gait & station:   Digits:   Nails:   Joints, Bones, Muscles:   ROM:   Stability:  Skin:  No rash/erythema/ecchymoses/petechiae/wounds/abscess/warm/dry  Musculoskeletal:  Full ROM in all joints w/o swelling/tenderness/effusion    NEUROLOGICAL EXAM:  HENT:  Normocephalic head; atraumatic head.  Neck supple.  ENT: normal looking.  Mental State:    Alert.  Fully oriented to person, place and date.  Coherent.  Speech clear and intact.  Cooperative.  Responds appropriately.    Cranial Nerves:  II-XII:   Pupils round and reactive to light and accommodation.  Extraocular movements full.  Visual fields full (no homonymous hemianopsia).  Visual acuity wnl.  Facial symmetry intact.  Tongue midline.  Motor Functions:  Moves all extremities.  No pronator drift of UE.  Claps hand well.  Hand  intact bilaterally.  Ambulatory.    Sensory Functions:   Intact to touch and pinprick to face and extremities.    Reflexes:  Deep tendon reflexes normoactive to biceps, knees and ankles.  Babinski absent (present).  Cerebellar Testing:    Finger to nose intact.  Nystagmus absent.  Neurovascular: Carotid auscultation full without bruits.      LABS:                        10.0   5.87  )-----------( 164      ( 2020 04:52 )             32.1     11-06    143  |  110<H>  |  16  ----------------------------<  107<H>  3.8   |  24  |  0.89    Ca    8.4<L>      2020 11:47  Phos  2.7     11-  Mg     2.1     -    TPro  7.2  /  Alb  4.0  /  TBili  0.8  /  DBili  x   /  AST  114<H>  /  ALT  25  /  AlkPhos  55  11-05    PT/INR - ( 2020 14:59 )   PT: 13.1 sec;   INR: 1.14 ratio         PTT - ( 2020 11:33 )  PTT:70.9 sec    Urinalysis Basic - ( 2020 22:42 )    Color: Yellow / Appearance: Clear / S.025 / pH: x  Gluc: x / Ketone: Trace  / Bili: Negative / Urobili: 1 mg/dL   Blood: x / Protein: 30 mg/dL / Nitrite: Negative   Leuk Esterase: Trace / RBC: 0-2 /HPF / WBC 0-2   Sq Epi: x / Non Sq Epi: Few / Bacteria: Few        RADIOLOGY & ADDITIONAL STUDIES:      Assessment & Opinion: events noted awaek alert speech fluent follows commands ct head bryanna  SMALL  occipotal infacrtion poor memeory poor historian  follows commands  ARM LEG 4/5 SENSORY INTACT VISUAL FIELD FULL  NO SEIZURE     Recommendations:  Brain MRI.  Carotid doppler.  Echocardiogram.  EEG.   DVT prophylaxis as ordered. ON ASA TSH B12 RPR WILL FOLLOW   Medications:

## 2020-11-06 NOTE — CONSULT NOTE ADULT - SUBJECTIVE AND OBJECTIVE BOX
CHIEF COMPLAINT:  Patient is a 80y old  Female who presents with a chief complaint of Altered Mental Status (2020 14:43)      HPI:   79yo female with unknown pmh [place]. Pt gave name and . Pt is AOX2, shivering but otherwise denies any complaints. Police currently attempting to look for family.    	No fever/chills, No photophobia/eye pain/changes in vision, No ear pain/sore throat/dysphagia, No chest pain/palpitations, no SOB/cough, no wheeze/stridor, No abdominal pain, No N/V/D, no dysuria/frequency/discharge, No neck/back pain, no rash, no changes in neurological status/function.      ALLERGIES:  No Known Allergies    Home Medications:  Aricept 5 mg oral tablet: 1 tab(s) orally once a day (at bedtime) (:41)  aspirin 81 mg oral tablet: 1 tab(s) orally once a day (:)  Coreg 3.125 mg oral tablet: 1 tab(s) orally 2 times a day (:)  furosemide 40 mg oral tablet: 1 tab(s) orally once a day (:)  Kaletra 200 mg-50 mg oral tablet: 2 tab(s) orally every 12 hours (:)  Lipitor 40 mg oral tablet: 1 tab(s) orally once a day (:41)  lisinopril 5 mg oral tablet: 1 tab(s) orally once a day (2020 14:41)  memantine 10 mg oral tablet: 1 tab(s) orally 2 times a day (:41)  Vitamin D2 50,000 intl units (1.25 mg) oral capsule: 1 cap(s) orally once a week (:41)    PAST MEDICAL & SURGICAL HISTORY:        FAMILY HISTORY:      SOCIAL HISTORY:    REVIEW OF SYSTEMS:  General:  No wt loss, fevers, chills, night sweats  Eyes:  Good vision, no reported pain  ENT:  No sore throat, pain, runny nose, dysphagia  CV:  No pain, palpitations, hypo/hypertension  Resp:  No dyspnea, cough, tachypnea, wheezing  GI:  No pain, nausea, vomiting, diarrhea, constipation  :  No pain, bleeding, incontinence, nocturia  Muscle:  No pain, weakness  Breast:  No pain, abscess, mass, discharge  Neuro:  No weakness, tingling, memory problems  Psych:  No fatigue, insomnia, mood problems, depression  Endocrine:  No polyuria, polydipsia, cold/heat intolerance  Heme:  No petechiae, ecchymosis, easy bruisability  Skin:  No rash, edema    PHYSICAL EXAM:  Vital Signs:  Vital Signs Last 24 Hrs  T(C): 36.6 (2020 13:33), Max: 37 (2020 18:10)  T(F): 97.8 (:33), Max: 98.6 (2020 18:10)  HR: 64 (:) (61 - 82)  BP: 125/77 (:) (112/68 - 137/74)  RR: 17 (:) (16 - 18)  SpO2: 95% (:) (94% - 98%)  I&O's Summary    2020 07:01  -  2020 07:00  --------------------------------------------------------  IN: 984 mL / OUT: 0 mL / NET: 984 mL      I&O's Detail    2020 07:01  -  2020 07:00  --------------------------------------------------------  IN:    Heparin Infusion: 72 mL    Oral Fluid: 912 mL  Total IN: 984 mL    OUT:  Total OUT: 0 mL    Total NET: 984 mL    Tele:     Constitutional: well developed, normal appearance, well groomed, well nourished, no deformities and no acute distress.   Eyes: the conjunctiva exhibited no abnormalities and the eyelids demonstrated no xanthelasmas.   HEENT: normal oral mucosa, no oral pallor and no oral cyanosis.   Neck: normal jugular venous A waves present, normal jugular venous V waves present and no jugular venous chaparro A waves.   Pulmonary: no respiratory distress, normal respiratory rhythm and effort, no accessory muscle use and lungs were clear to auscultation bilaterally.   Cardiovascular: heart rate and rhythm were normal, normal S1 and S2 and no murmur, gallop, rub, heave or thrill are present.   Abdomen: soft, non-tender, no hepato-splenomegaly and no abdominal mass palpated.   Musculoskeletal: the gait could not be assessed..   Extremities: no clubbing of the fingernails, no localized cyanosis, no petechial hemorrhages and no ischemic changes.   Skin: normal skin color and pigmentation, no rash, no venous stasis, no skin lesions, no skin ulcer and no xanthoma was observed.   Psychiatric: oriented to person, place, and time, the affect was normal, the mood was normal and not feeling anxious.      LABORATORY:                          10.0   5.87  )-----------( 164      ( 2020 04:52 )             32.1     11-06    143  |  110<H>  |  16  ----------------------------<  107<H>  3.8   |  24  |  0.89    Ca    8.4<L>      2020 11:47  Phos  2.7     11-06  Mg     2.1     11-06    TPro  7.2  /  Alb  4.0  /  TBili  0.8  /  DBili  x   /  AST  114<H>  /  ALT  25  /  AlkPhos  55  11-05      CARDIAC MARKERS ( 2020 11:47 )  14.600 ng/mL / x     / x     / x     / x      CARDIAC MARKERS ( 2020 03:54 )  21.900 ng/mL / x     / x     / x     / x      CARDIAC MARKERS ( 2020 21:22 )  24.200 ng/mL / x     / x     / x     / x      CARDIAC MARKERS ( 2020 14:59 )  27.200 ng/mL / x     / 623 U/L / x     / 14.1 ng/mL  CARDIAC MARKERS ( 2020 09:03 )  24.000 ng/mL / x     / x     / x     / x            LIVER FUNCTIONS - ( 2020 09:03 )  Alb: 4.0 g/dL / Pro: 7.2 gm/dL / ALK PHOS: 55 U/L / ALT: 25 U/L / AST: 114 U/L / GGT: x           PT/INR - ( 2020 14:59 )   PT: 13.1 sec;   INR: 1.14 ratio         PTT - ( 2020 11:33 )  PTT:70.9 sec  Urinalysis Basic - ( 2020 22:42 )    Color: Yellow / Appearance: Clear / S.025 / pH: x  Gluc: x / Ketone: Trace  / Bili: Negative / Urobili: 1 mg/dL   Blood: x / Protein: 30 mg/dL / Nitrite: Negative   Leuk Esterase: Trace / RBC: 0-2 /HPF / WBC 0-2   Sq Epi: x / Non Sq Epi: Few / Bacteria: Few    IMAGING:    < from: 12 Lead ECG (20 @ 09:10) >  Sinus rhythm withfrequent premature ventricular complexes  Possible Left atrial enlargement  Left axis deviation  Left bundle branch block  Abnormal ECG    < end of copied text >      < from: Xray Chest 1 View- PORTABLE-Urgent (Xray Chest 1 View- PORTABLE-Urgent .) (20 @ 14:58) >  Impression: Cardiomegaly. Probable intrathoracic thyroid. Generalized nonspecific interstitial prominence.    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 This may be further assessed with MR imaging.  2)  chronic ischemic changes also noted in both hemispheres as well as within the posterior fossa. No hemorrhagiclesion or mass noted.    < end of copied text >      < from: TTE Echo Complete w/o Contrast w/ Doppler (20 @ 17:09) >  Summary:   1. Left ventricular ejection fraction, by visual estimation, is <20%.   2. Technically good study.   3. Severely decreased global left ventricular systolic function.   4. Basal and mid inferior wall and basal and mid inferolateral wall are abnormal as described above.   5. Dilated cardiomyopathy.   6. Moderately increased left ventricular internal cavity size.   7. Spectral Doppler shows impaired relaxation pattern of left ventricular myocardial filling (Grade I diastolic dysfunction).   8. Mildly reduced RV systolic function.   9. Mildly enlarged left atrium.  10. Normal right atrial size.  11. There is no evidence of pericardial effusion.  12. Mild mitral valve regurgitation.  13. Mild The mitral valve leaflets are tethered which is due to reduced systolic function and elevated LVDP.  14. Mild tricuspid regurgitation.  15. Sclerotic aortic valve with normal opening.  16.Estimated pulmonary artery systolic pressure is 38.1 mmHg assuming a right atrial pressure of 10 mmHg, which is consistent with borderline pulmonary hypertension.  17. There is mild aortic root calcification.    < end of copied text >    ASSESSMENT:   79yo female with unknown pmh [place]. Pt gave name and . Pt is AOX2, shivering but otherwise denies any complaints. Police currently attempting to look for family.    	No fever/chills, No photophobia/eye pain/changes in vision, No ear pain/sore throat/dysphagia, No chest pain/palpitations, no SOB/cough, no wheeze/stridor, No abdominal pain, No N/V/D, no dysuria/frequency/discharge, No neck/back pain, no rash, no changes in neurological status/function. NSTEMI with peak trop 27.    PLAN:       aspirin 325 milliGRAM(s) Oral daily  atorvastatin 40 milliGRAM(s) Oral at bedtime  carvedilol 3.125 milliGRAM(s) Oral every 12 hours  clopidogrel Tablet 75 milliGRAM(s) Oral daily  donepezil 5 milliGRAM(s) Oral at bedtime  furosemide    Tablet 40 milliGRAM(s) Oral daily  heparin  Infusion. 600 Unit(s)/Hr (6 mL/Hr) IV Continuous <Continuous>  lisinopril 5 milliGRAM(s) Oral daily  lopinavir 200 mG/ritonavir 50 mG 1 Tablet(s) Oral every 12 hours  memantine 10 milliGRAM(s) Oral two times a day    supportive care.   Will try to raise coreg.  goals of care tbd.    Todd Lamas MD, FACC, FASE, FASNC, FACP  Director, Heart Failure Services  [facility]  , Department of Cardiology  [facility]     CHIEF COMPLAINT:  Patient is a 80y old  Female who presents with a chief complaint of Altered Mental Status (2020 14:43)      HPI:   81yo female with unknown pmh [place]. Pt gave name and . Pt is AOX2, shivering but otherwise denies any complaints. Police currently attempting to look for family.    	No fever/chills, No photophobia/eye pain/changes in vision, No ear pain/sore throat/dysphagia, No chest pain/palpitations, no SOB/cough, no wheeze/stridor, No abdominal pain, No N/V/D, no dysuria/frequency/discharge, No neck/back pain, no rash, no changes in neurological status/function.      ALLERGIES:  No Known Allergies    Home Medications:  Aricept 5 mg oral tablet: 1 tab(s) orally once a day (at bedtime) (:41)  aspirin 81 mg oral tablet: 1 tab(s) orally once a day (:)  Coreg 3.125 mg oral tablet: 1 tab(s) orally 2 times a day (:)  furosemide 40 mg oral tablet: 1 tab(s) orally once a day (:)  Kaletra 200 mg-50 mg oral tablet: 2 tab(s) orally every 12 hours (:)  Lipitor 40 mg oral tablet: 1 tab(s) orally once a day (:41)  lisinopril 5 mg oral tablet: 1 tab(s) orally once a day (2020 14:41)  memantine 10 mg oral tablet: 1 tab(s) orally 2 times a day (:41)  Vitamin D2 50,000 intl units (1.25 mg) oral capsule: 1 cap(s) orally once a week (:)    PAST MEDICAL & SURGICAL HISTORY:  unknown      FAMILY HISTORY:  n/a    SOCIAL HISTORY:  active ppd smoker according to pt.    REVIEW OF SYSTEMS:  General:  No wt loss, fevers, chills, night sweats  Eyes:  Good vision, no reported pain  ENT:  No sore throat, pain, runny nose, dysphagia  CV:  No pain, palpitations, hypo/hypertension  Resp:  No dyspnea, cough, tachypnea, wheezing  GI:  No pain, nausea, vomiting, diarrhea, constipation  :  No pain, bleeding, incontinence, nocturia  Muscle:  No pain, weakness  Breast:  No pain, abscess, mass, discharge  Neuro:  No weakness, tingling, memory problems  Psych:  No fatigue, insomnia, mood problems, depression  Endocrine:  No polyuria, polydipsia, cold/heat intolerance  Heme:  No petechiae, ecchymosis, easy bruisability  Skin:  No rash, edema    PHYSICAL EXAM:  Vital Signs:  Vital Signs Last 24 Hrs  T(C): 36.6 (2020 13:33), Max: 37 (2020 18:10)  T(F): 97.8 (2020 13:33), Max: 98.6 (2020 18:10)  HR: 64 (2020 13:33) (61 - 82)  BP: 125/77 (2020 13:33) (112/68 - 137/74)  RR: 17 (2020 13:33) (16 - 18)  SpO2: 95% (:33) (94% - 98%)  I&O's Summary    2020 07:01  -  2020 07:00  --------------------------------------------------------  IN: 984 mL / OUT: 0 mL / NET: 984 mL      I&O's Detail    2020 07:01  -  2020 07:00  --------------------------------------------------------  IN:    Heparin Infusion: 72 mL    Oral Fluid: 912 mL  Total IN: 984 mL    OUT:  Total OUT: 0 mL    Total NET: 984 mL    Tele: SR    Constitutional: no deformities and no acute distress.   Eyes: the conjunctiva exhibited no abnormalities and the eyelids demonstrated no xanthelasmas.   HEENT: normal oral mucosa, no oral pallor and no oral cyanosis.   Neck: normal jugular venous A waves present, normal jugular venous V waves present and no jugular venous chaparro A waves.   Pulmonary: no respiratory distress, normal respiratory rhythm and effort, no accessory muscle use and lungs were clear to auscultation bilaterally.   Cardiovascular: heart rate and rhythm were normal, normal S1 and S2 and no murmur, gallop, rub, heave or thrill are present.   Abdomen: soft, non-tender  Musculoskeletal: the gait could not be assessed.   Extremities: no clubbing of the fingernails, no localized cyanosis, no petechial hemorrhages and no ischemic changes.   Skin: normal skin color and pigmentation, no rash, no venous stasis, no skin lesions, no skin ulcer and no xanthoma was observed.   Psychiatric: oriented to person.    LABORATORY:                          10.0   5.87  )-----------( 164      ( 2020 04:52 )             32.1     11    143  |  110<H>  |  16  ----------------------------<  107<H>  3.8   |  24  |  0.89    Ca    8.4<L>      2020 11:47  Phos  2.7     -  Mg     2.1     -    TPro  7.2  /  Alb  4.0  /  TBili  0.8  /  DBili  x   /  AST  114<H>  /  ALT  25  /  AlkPhos  55  11-05      CARDIAC MARKERS ( 2020 11:47 )  14.600 ng/mL / x     / x     / x     / x      CARDIAC MARKERS ( 2020 03:54 )  21.900 ng/mL / x     / x     / x     / x      CARDIAC MARKERS ( 2020 21:22 )  24.200 ng/mL / x     / x     / x     / x      CARDIAC MARKERS ( 2020 14:59 )  27.200 ng/mL / x     / 623 U/L / x     / 14.1 ng/mL  CARDIAC MARKERS ( 2020 09:03 )  24.000 ng/mL / x     / x     / x     / x            LIVER FUNCTIONS - ( 2020 09:03 )  Alb: 4.0 g/dL / Pro: 7.2 gm/dL / ALK PHOS: 55 U/L / ALT: 25 U/L / AST: 114 U/L / GGT: x           PT/INR - ( 2020 14:59 )   PT: 13.1 sec;   INR: 1.14 ratio         PTT - ( 2020 11:33 )  PTT:70.9 sec  Urinalysis Basic - ( 2020 22:42 )    Color: Yellow / Appearance: Clear / S.025 / pH: x  Gluc: x / Ketone: Trace  / Bili: Negative / Urobili: 1 mg/dL   Blood: x / Protein: 30 mg/dL / Nitrite: Negative   Leuk Esterase: Trace / RBC: 0-2 /HPF / WBC 0-2   Sq Epi: x / Non Sq Epi: Few / Bacteria: Few    IMAGING:    < from: 12 Lead ECG (20 @ 09:10) >  Sinus rhythm with frequent premature ventricular complexes  Possible Left atrial enlargement  Left axis deviation  Left bundle branch block  Abnormal ECG    < end of copied text >      < from: Xray Chest 1 View- PORTABLE-Urgent (Xray Chest 1 View- PORTABLE-Urgent .) (20 @ 14:58) >  Impression: Cardiomegaly. Probable intrathoracic thyroid. Generalized nonspecific interstitial prominence.    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 This may be further assessed with MR imaging.  2)  chronic ischemic changes also noted in both hemispheres as well as within the posterior fossa. No hemorrhagiclesion or mass noted.    < end of copied text >      < from: TTE Echo Complete w/o Contrast w/ Doppler (20 @ 17:09) >  Summary:   1. Left ventricular ejection fraction, by visual estimation, is <20%.   2. Technically good study.   3. Severely decreased global left ventricular systolic function.   4. Basal and mid inferior wall and basal and mid inferolateral wall are abnormal as described above.   5. Dilated cardiomyopathy.   6. Moderately increased left ventricular internal cavity size.   7. Spectral Doppler shows impaired relaxation pattern of left ventricular myocardial filling (Grade I diastolic dysfunction).   8. Mildly reduced RV systolic function.   9. Mildly enlarged left atrium.  10. Normal right atrial size.  11. There is no evidence of pericardial effusion.  12. Mild mitral valve regurgitation.  13. Mild The mitral valve leaflets are tethered which is due to reduced systolic function and elevated LVDP.  14. Mild tricuspid regurgitation.  15. Sclerotic aortic valve with normal opening.  16.Estimated pulmonary artery systolic pressure is 38.1 mmHg assuming a right atrial pressure of 10 mmHg, which is consistent with borderline pulmonary hypertension.  17. There is mild aortic root calcification.    < end of copied text >    ASSESSMENT:   81yo female with unknown pmh [place]. Pt gave name and . Pt is AOX2, shivering but otherwise denies any complaints. Police currently attempting to look for family.    	No fever/chills, No photophobia/eye pain/changes in vision, No ear pain/sore throat/dysphagia, No chest pain/palpitations, no SOB/cough, no wheeze/stridor, No abdominal pain, No N/V/D, no dysuria/frequency/discharge, No neck/back pain, no rash, no changes in neurological status/function. NSTEMI with peak trop 27.    PLAN:       aspirin 325 milliGRAM(s) Oral daily  atorvastatin 40 milliGRAM(s) Oral at bedtime  carvedilol 3.125 milliGRAM(s) Oral every 12 hours  clopidogrel Tablet 75 milliGRAM(s) Oral daily  donepezil 5 milliGRAM(s) Oral at bedtime  furosemide    Tablet 40 milliGRAM(s) Oral daily  heparin  Infusion. 600 Unit(s)/Hr (6 mL/Hr) IV Continuous <Continuous>  lisinopril 5 milliGRAM(s) Oral daily  lopinavir 200 mG/ritonavir 50 mG 1 Tablet(s) Oral every 12 hours  memantine 10 milliGRAM(s) Oral two times a day    supportive care.   Will try to raise coreg.  goals of care tbd.  nicotine patch.  probably no aggressive cardiac intervention now.    Todd Lamas MD, FACC, FASE, FASNC, FACP  Director, Heart Failure Services  [facility]  , Department of Cardiology  [facility]     CHIEF COMPLAINT:  Patient is a 80y old  Female who presents with a chief complaint of Altered Mental Status (2020 14:43)      HPI:   79yo female with unknown pmh [place]. Pt gave name and . Pt is AOX2, shivering but otherwise denies any complaints. Police currently attempting to look for family.    	No fever/chills, No photophobia/eye pain/changes in vision, No ear pain/sore throat/dysphagia, No chest pain/palpitations, no SOB/cough, no wheeze/stridor, No abdominal pain, No N/V/D, no dysuria/frequency/discharge, No neck/back pain, no rash, no changes in neurological status/function.      ALLERGIES:  No Known Allergies    Home Medications:  Aricept 5 mg oral tablet: 1 tab(s) orally once a day (at bedtime) (:41)  aspirin 81 mg oral tablet: 1 tab(s) orally once a day (:)  Coreg 3.125 mg oral tablet: 1 tab(s) orally 2 times a day (:)  furosemide 40 mg oral tablet: 1 tab(s) orally once a day (:)  Kaletra 200 mg-50 mg oral tablet: 2 tab(s) orally every 12 hours (:)  Lipitor 40 mg oral tablet: 1 tab(s) orally once a day (:41)  lisinopril 5 mg oral tablet: 1 tab(s) orally once a day (2020 14:41)  memantine 10 mg oral tablet: 1 tab(s) orally 2 times a day (:41)  Vitamin D2 50,000 intl units (1.25 mg) oral capsule: 1 cap(s) orally once a week (:)    PAST MEDICAL & SURGICAL HISTORY:  unknown      FAMILY HISTORY:  n/a    SOCIAL HISTORY:  active ppd smoker according to pt.    REVIEW OF SYSTEMS:  General:  No wt loss, fevers, chills, night sweats  Eyes:  Good vision, no reported pain  ENT:  No sore throat, pain, runny nose, dysphagia  CV:  No pain, palpitations, hypo/hypertension  Resp:  No dyspnea, cough, tachypnea, wheezing  GI:  No pain, nausea, vomiting, diarrhea, constipation  :  No pain, bleeding, incontinence, nocturia  Muscle:  No pain, weakness  Breast:  No pain, abscess, mass, discharge  Neuro:  No weakness, tingling, memory problems  Psych:  No fatigue, insomnia, mood problems, depression  Endocrine:  No polyuria, polydipsia, cold/heat intolerance  Heme:  No petechiae, ecchymosis, easy bruisability  Skin:  No rash, edema    PHYSICAL EXAM:  Vital Signs:  Vital Signs Last 24 Hrs  T(C): 36.6 (2020 13:33), Max: 37 (2020 18:10)  T(F): 97.8 (2020 13:33), Max: 98.6 (2020 18:10)  HR: 64 (2020 13:33) (61 - 82)  BP: 125/77 (2020 13:33) (112/68 - 137/74)  RR: 17 (2020 13:33) (16 - 18)  SpO2: 95% (:33) (94% - 98%)  I&O's Summary    2020 07:01  -  2020 07:00  --------------------------------------------------------  IN: 984 mL / OUT: 0 mL / NET: 984 mL      I&O's Detail    2020 07:01  -  2020 07:00  --------------------------------------------------------  IN:    Heparin Infusion: 72 mL    Oral Fluid: 912 mL  Total IN: 984 mL    OUT:  Total OUT: 0 mL    Total NET: 984 mL    Tele: SR, LBBB, isolated PVCs    Constitutional: no deformities and no acute distress.   Eyes: the conjunctiva exhibited no abnormalities and the eyelids demonstrated no xanthelasmas.   HEENT: normal oral mucosa, no oral pallor and no oral cyanosis.   Neck: normal jugular venous A waves present, normal jugular venous V waves present and no jugular venous chaparro A waves.   Pulmonary: no respiratory distress, normal respiratory rhythm and effort, no accessory muscle use and lungs were clear to auscultation bilaterally.   Cardiovascular: heart rate and rhythm were normal, normal S1 and S2 and no murmur, gallop, rub, heave or thrill are present.   Abdomen: soft, non-tender  Musculoskeletal: the gait could not be assessed.   Extremities: no clubbing of the fingernails, no localized cyanosis, no petechial hemorrhages and no ischemic changes.   Skin: normal skin color and pigmentation, no rash, no venous stasis, no skin lesions, no skin ulcer and no xanthoma was observed.   Psychiatric: oriented to person.    LABORATORY:                          10.0   5.87  )-----------( 164      ( 2020 04:52 )             32.1     11-    143  |  110<H>  |  16  ----------------------------<  107<H>  3.8   |  24  |  0.89    Ca    8.4<L>      2020 11:47  Phos  2.7     11-  Mg     2.1     11-06    TPro  7.2  /  Alb  4.0  /  TBili  0.8  /  DBili  x   /  AST  114<H>  /  ALT  25  /  AlkPhos  55  11-05      CARDIAC MARKERS ( 2020 11:47 )  14.600 ng/mL / x     / x     / x     / x      CARDIAC MARKERS ( 2020 03:54 )  21.900 ng/mL / x     / x     / x     / x      CARDIAC MARKERS ( 2020 21:22 )  24.200 ng/mL / x     / x     / x     / x      CARDIAC MARKERS ( 2020 14:59 )  27.200 ng/mL / x     / 623 U/L / x     / 14.1 ng/mL  CARDIAC MARKERS ( 2020 09:03 )  24.000 ng/mL / x     / x     / x     / x            LIVER FUNCTIONS - ( 2020 09:03 )  Alb: 4.0 g/dL / Pro: 7.2 gm/dL / ALK PHOS: 55 U/L / ALT: 25 U/L / AST: 114 U/L / GGT: x           PT/INR - ( 2020 14:59 )   PT: 13.1 sec;   INR: 1.14 ratio         PTT - ( 2020 11:33 )  PTT:70.9 sec  Urinalysis Basic - ( 2020 22:42 )    Color: Yellow / Appearance: Clear / S.025 / pH: x  Gluc: x / Ketone: Trace  / Bili: Negative / Urobili: 1 mg/dL   Blood: x / Protein: 30 mg/dL / Nitrite: Negative   Leuk Esterase: Trace / RBC: 0-2 /HPF / WBC 0-2   Sq Epi: x / Non Sq Epi: Few / Bacteria: Few    IMAGING:    < from: 12 Lead ECG (20 @ 09:10) >  Sinus rhythm with frequent premature ventricular complexes  Possible Left atrial enlargement  Left axis deviation  Left bundle branch block  Abnormal ECG    < end of copied text >      < from: Xray Chest 1 View- PORTABLE-Urgent (Xray Chest 1 View- PORTABLE-Urgent .) (20 @ 14:58) >  Impression: Cardiomegaly. Probable intrathoracic thyroid. Generalized nonspecific interstitial prominence.    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 This may be further assessed with MR imaging.  2)  chronic ischemic changes also noted in both hemispheres as well as within the posterior fossa. No hemorrhagiclesion or mass noted.    < end of copied text >      < from: TTE Echo Complete w/o Contrast w/ Doppler (20 @ 17:09) >  Summary:   1. Left ventricular ejection fraction, by visual estimation, is <20%.   2. Technically good study.   3. Severely decreased global left ventricular systolic function.   4. Basal and mid inferior wall and basal and mid inferolateral wall are abnormal as described above.   5. Dilated cardiomyopathy.   6. Moderately increased left ventricular internal cavity size.   7. Spectral Doppler shows impaired relaxation pattern of left ventricular myocardial filling (Grade I diastolic dysfunction).   8. Mildly reduced RV systolic function.   9. Mildly enlarged left atrium.  10. Normal right atrial size.  11. There is no evidence of pericardial effusion.  12. Mild mitral valve regurgitation.  13. Mild The mitral valve leaflets are tethered which is due to reduced systolic function and elevated LVDP.  14. Mild tricuspid regurgitation.  15. Sclerotic aortic valve with normal opening.  16.Estimated pulmonary artery systolic pressure is 38.1 mmHg assuming a right atrial pressure of 10 mmHg, which is consistent with borderline pulmonary hypertension.  17. There is mild aortic root calcification.    < end of copied text >    ASSESSMENT:   79yo female with unknown pmh found [place] highway. Pt gave name and . Pt is AOX2, shivering but otherwise denies any complaints. Police currently attempting to look for family.    	No fever/chills, No photophobia/eye pain/changes in vision, No ear pain/sore throat/dysphagia, No chest pain/palpitations, no SOB/cough, no wheeze/stridor, No abdominal pain, No N/V/D, no dysuria/frequency/discharge, No neck/back pain, no rash, no changes in neurological status/function. NSTEMI with peak trop 27.    PLAN:       aspirin 325 milliGRAM(s) Oral daily  atorvastatin 40 milliGRAM(s) Oral at bedtime  carvedilol 3.125 milliGRAM(s) Oral every 12 hours  clopidogrel Tablet 75 milliGRAM(s) Oral daily  donepezil 5 milliGRAM(s) Oral at bedtime  furosemide    Tablet 40 milliGRAM(s) Oral daily  heparin  Infusion. 600 Unit(s)/Hr (6 mL/Hr) IV Continuous <Continuous>  lisinopril 5 milliGRAM(s) Oral daily  lopinavir 200 mG/ritonavir 50 mG 1 Tablet(s) Oral every 12 hours  memantine 10 milliGRAM(s) Oral two times a day    supportive care.   Will try to raise coreg.  goals of care tbd.  nicotine patch.  probably no aggressive cardiac intervention now.    Todd Lamas MD, FACC, FASE, FASNC, FACP  Director, Heart Failure Services  [facility]  , Department of Cardiology  [facility]

## 2020-11-06 NOTE — DIETITIAN INITIAL EVALUATION ADULT. - OTHER CALCULATIONS
Ht (cm):  162.56     Wt (kg):   50.7 (11/6)    BMI:     19.2    IBW: 54.5   %IBW:  93%   UBW:  stable   %UBW: 100%

## 2020-11-07 LAB
APTT BLD: 55.6 SEC — HIGH (ref 27.5–35.5)
CHOLEST SERPL-MCNC: 127 MG/DL — SIGNIFICANT CHANGE UP
HCT VFR BLD CALC: 34 % — LOW (ref 34.5–45)
HDLC SERPL-MCNC: 58 MG/DL — SIGNIFICANT CHANGE UP
HGB BLD-MCNC: 10.4 G/DL — LOW (ref 11.5–15.5)
LIPID PNL WITH DIRECT LDL SERPL: 58 MG/DL — SIGNIFICANT CHANGE UP
MCHC RBC-ENTMCNC: 23.9 PG — LOW (ref 27–34)
MCHC RBC-ENTMCNC: 30.6 GM/DL — LOW (ref 32–36)
MCV RBC AUTO: 78 FL — LOW (ref 80–100)
NON HDL CHOLESTEROL: 69 MG/DL — SIGNIFICANT CHANGE UP
NRBC # BLD: 0 /100 WBCS — SIGNIFICANT CHANGE UP (ref 0–0)
PLATELET # BLD AUTO: 186 K/UL — SIGNIFICANT CHANGE UP (ref 150–400)
RBC # BLD: 4.36 M/UL — SIGNIFICANT CHANGE UP (ref 3.8–5.2)
RBC # FLD: 13.8 % — SIGNIFICANT CHANGE UP (ref 10.3–14.5)
TRIGL SERPL-MCNC: 50 MG/DL — SIGNIFICANT CHANGE UP
TROPONIN I SERPL-MCNC: 7.4 NG/ML — HIGH (ref 0.01–0.04)
TROPONIN I SERPL-MCNC: 9.3 NG/ML — HIGH (ref 0.01–0.04)
WBC # BLD: 4.91 K/UL — SIGNIFICANT CHANGE UP (ref 3.8–10.5)
WBC # FLD AUTO: 4.91 K/UL — SIGNIFICANT CHANGE UP (ref 3.8–10.5)

## 2020-11-07 PROCEDURE — 99233 SBSQ HOSP IP/OBS HIGH 50: CPT

## 2020-11-07 PROCEDURE — 93880 EXTRACRANIAL BILAT STUDY: CPT | Mod: 26

## 2020-11-07 PROCEDURE — 93010 ELECTROCARDIOGRAM REPORT: CPT

## 2020-11-07 RX ORDER — DIPHENHYDRAMINE HCL 50 MG
25 CAPSULE ORAL ONCE
Refills: 0 | Status: COMPLETED | OUTPATIENT
Start: 2020-11-07 | End: 2020-11-07

## 2020-11-07 RX ORDER — CARVEDILOL PHOSPHATE 80 MG/1
3.12 CAPSULE, EXTENDED RELEASE ORAL EVERY 12 HOURS
Refills: 0 | Status: DISCONTINUED | OUTPATIENT
Start: 2020-11-07 | End: 2020-12-01

## 2020-11-07 RX ORDER — LANOLIN ALCOHOL/MO/W.PET/CERES
3 CREAM (GRAM) TOPICAL AT BEDTIME
Refills: 0 | Status: DISCONTINUED | OUTPATIENT
Start: 2020-11-07 | End: 2020-12-01

## 2020-11-07 RX ADMIN — DONEPEZIL HYDROCHLORIDE 5 MILLIGRAM(S): 10 TABLET, FILM COATED ORAL at 21:57

## 2020-11-07 RX ADMIN — MEMANTINE HYDROCHLORIDE 10 MILLIGRAM(S): 10 TABLET ORAL at 05:48

## 2020-11-07 RX ADMIN — CLOPIDOGREL BISULFATE 75 MILLIGRAM(S): 75 TABLET, FILM COATED ORAL at 11:52

## 2020-11-07 RX ADMIN — Medication 0.5 MILLIGRAM(S): at 14:51

## 2020-11-07 RX ADMIN — Medication 25 MILLIGRAM(S): at 01:02

## 2020-11-07 RX ADMIN — MEMANTINE HYDROCHLORIDE 10 MILLIGRAM(S): 10 TABLET ORAL at 17:14

## 2020-11-07 RX ADMIN — HEPARIN SODIUM 600 UNIT(S)/HR: 5000 INJECTION INTRAVENOUS; SUBCUTANEOUS at 07:18

## 2020-11-07 RX ADMIN — Medication 325 MILLIGRAM(S): at 11:51

## 2020-11-07 RX ADMIN — Medication 1 TABLET(S): at 05:49

## 2020-11-07 RX ADMIN — Medication 1 PATCH: at 07:52

## 2020-11-07 RX ADMIN — Medication 3 MILLIGRAM(S): at 01:03

## 2020-11-07 RX ADMIN — CARVEDILOL PHOSPHATE 6.25 MILLIGRAM(S): 80 CAPSULE, EXTENDED RELEASE ORAL at 05:49

## 2020-11-07 RX ADMIN — LISINOPRIL 5 MILLIGRAM(S): 2.5 TABLET ORAL at 05:48

## 2020-11-07 RX ADMIN — Medication 40 MILLIGRAM(S): at 05:49

## 2020-11-07 RX ADMIN — ATORVASTATIN CALCIUM 40 MILLIGRAM(S): 80 TABLET, FILM COATED ORAL at 21:57

## 2020-11-07 RX ADMIN — Medication 3 MILLIGRAM(S): at 21:57

## 2020-11-07 RX ADMIN — Medication 1 PATCH: at 11:52

## 2020-11-07 RX ADMIN — Medication 1 TABLET(S): at 17:14

## 2020-11-07 NOTE — PROGRESS NOTE ADULT - ASSESSMENT
80 years old female with history of dementia and Hypertension and diabetes wfound with wandering with cerebrovascular accident seeen and lbbb with very elevated trop     Problem/Plan - 1:  ·  Problem: Systolic Heart Failure   - EF < 20 % and patient is bradycardic.  Decreased Carvedilol to 3.125 mg BID with appropriate holds  - repeat ekg now  - Cardiology eval appreciated  - pateint is confused and cannot make her own decisions.  If there is now HCP or family to make patient's decision will need 2 PC consent for any planned procedure     Problem/Plan - 2:  ·  Problem: Anemia  - check occult blood  - hgb on previous admission 11 --> 10  - MCV decreased  - check iron studies     Problem/Plan - 2:  ·  Problem: NSTEMI (non-ST elevated myocardial infarction).    Plan:   - heparin gtt  - check am EKG.  EKG in ED demonstrated LBBB, unsure as to whether this is a new finding.  Am ekg unchanged  - TTE demonstrates low EF  - continue to trend troponin     Problem/Plan - 3:  ·  Problem: CVA (cerebral vascular accident).  Plan: Carotids wnl  - for MR head today  - Neurology eval appreciated     Problem/Plan - 4:  ·  Problem: HIV?  - on Kaletra  - may have AIDS dementia??    Problem/Plan - 5:  ·  Problem: Essential hypertension.  Plan: continue home meds.

## 2020-11-07 NOTE — PROGRESS NOTE ADULT - SUBJECTIVE AND OBJECTIVE BOX
CHIEF COMPLAINT:  Patient is a 80y old  Female who presents with a chief complaint of Altered Mental Status (06 Nov 2020 14:43)      HPI:  72-year-old female with unclear medical history found along highway confused. Seen to have left bundle branch block of unclear duration and echo reveals severe global dilated cardiomyopathy. Also non-STEMI noted with peak troponin of approximately 27. Seen ambulating today without chest complaints or shortness of breath. Noted to be a smoker currently on nicotine replacement patch.      REVIEW OF SYSTEMS:  General:  No wt loss, fevers, chills, night sweats  Eyes:  Good vision, no reported pain  ENT:  No sore throat, pain, runny nose, dysphagia  CV:  No pain, palpitations, hypo/hypertension  Resp:  No dyspnea, cough, tachypnea, wheezing  GI:  No pain, nausea, vomiting, diarrhea, constipation  :  No pain, bleeding, incontinence, nocturia  Muscle:  No pain, weakness  Breast:  No pain, abscess, mass, discharge  Neuro:  No weakness, tingling, memory problems  Psych:  No fatigue, insomnia, mood problems, depression  Endocrine:  No polyuria, polydipsia, cold/heat intolerance  Heme:  No petechiae, ecchymosis, easy bruisability  Skin:  No rash, edema    PHYSICAL EXAM:  Vital Signs Last 24 Hrs  T(C): 36.7 (07 Nov 2020 16:11), Max: 36.7 (07 Nov 2020 05:46)  T(F): 98 (07 Nov 2020 16:11), Max: 98 (07 Nov 2020 05:46)  HR: 57 (07 Nov 2020 16:11) (52 - 66)  BP: 121/76 (07 Nov 2020 16:11) (108/69 - 144/78)  RR: 18 (07 Nov 2020 16:11) (18 - 18)  SpO2: 96% (07 Nov 2020 16:11) (94% - 96%)    Tele: SR, LBBB, isolated PVCs    Constitutional: no deformities and no acute distress.   Eyes: the conjunctiva exhibited no abnormalities and the eyelids demonstrated no xanthelasmas.   HEENT: normal oral mucosa, no oral pallor and no oral cyanosis.   Neck: normal jugular venous A waves present, normal jugular venous V waves present and no jugular venous chaparro A waves.   Pulmonary: no respiratory distress, normal respiratory rhythm and effort, no accessory muscle use and lungs were clear to auscultation bilaterally.   Cardiovascular: heart rate and rhythm were normal, normal S1 and S2 and no murmur, gallop, rub, heave or thrill are present.   Abdomen: soft, non-tender  Musculoskeletal: the gait could not be assessed.   Extremities: no clubbing of the fingernails, no localized cyanosis, no petechial hemorrhages and no ischemic changes.   Skin: normal skin color and pigmentation, no rash, no venous stasis, no skin lesions, no skin ulcer and no xanthoma was observed.   Psychiatric: oriented to person.    LABORATORY:                        10.4   4.91  )-----------( 186      ( 07 Nov 2020 06:37 )             34.0     11-06    143  |  110<H>  |  16  ----------------------------<  107<H>  3.8   |  24  |  0.89    Ca    8.4<L>      06 Nov 2020 11:47  Phos  2.7     11-06  Mg     2.1     11-06        CARDIAC MARKERS ( 06 Nov 2020 11:47 )  14.600 ng/mL / x     / x     / x     / x      CARDIAC MARKERS ( 06 Nov 2020 03:54 )  21.900 ng/mL / x     / x     / x     / x      CARDIAC MARKERS ( 05 Nov 2020 21:22 )  24.200 ng/mL / x     / x     / x     / x      CARDIAC MARKERS ( 05 Nov 2020 14:59 )  27.200 ng/mL / x     / 623 U/L / x     / 14.1 ng/mL  CARDIAC MARKERS ( 05 Nov 2020 09:03 )  24.000 ng/mL / x     / x     / x     / x            IMAGING:    < from: 12 Lead ECG (11.05.20 @ 09:10) >  Sinus rhythm with frequent premature ventricular complexes  Possible Left atrial enlargement  Left axis deviation  Left bundle branch block  Abnormal ECG    < end of copied text >      < from: Xray Chest 1 View- PORTABLE-Urgent (Xray Chest 1 View- PORTABLE-Urgent .) (11.05.20 @ 14:58) >  Impression: Cardiomegaly. Probable intrathoracic thyroid. Generalized nonspecific interstitial prominence.    < end of copied text >      < from: CT Head No Cont (11.05.20 @ 13:00) >  1)  late subacute or chronic appearing bilateral occipital/PCA territory infarcts. This may be further assessed with MR imaging.  2)  chronic ischemic changes also noted in both hemispheres as well as within the posterior fossa. No hemorrhagiclesion or mass noted.    < end of copied text >      < from: TTE Echo Complete w/o Contrast w/ Doppler (11.05.20 @ 17:09) >  Summary:   1. Left ventricular ejection fraction, by visual estimation, is <20%.   2. Technically good study.   3. Severely decreased global left ventricular systolic function.   4. Basal and mid inferior wall and basal and mid inferolateral wall are abnormal as described above.   5. Dilated cardiomyopathy.   6. Moderately increased left ventricular internal cavity size.   7. Spectral Doppler shows impaired relaxation pattern of left ventricular myocardial filling (Grade I diastolic dysfunction).   8. Mildly reduced RV systolic function.   9. Mildly enlarged left atrium.  10. Normal right atrial size.  11. There is no evidence of pericardial effusion.  12. Mild mitral valve regurgitation.  13. Mild The mitral valve leaflets are tethered which is due to reduced systolic function and elevated LVDP.  14. Mild tricuspid regurgitation.  15. Sclerotic aortic valve with normal opening.  16.Estimated pulmonary artery systolic pressure is 38.1 mmHg assuming a right atrial pressure of 10 mmHg, which is consistent with borderline pulmonary hypertension.  17. There is mild aortic root calcification.    < end of copied text >    ASSESSMENT:  80-year-old female with unclear medical history [place] highway confused. Seen to have left bundle branch block of unclear duration and echo reveals severe global dilated cardiomyopathy. Also non-STEMI noted with peak troponin of approximately 27. Seen ambulating today without chest complaints or shortness of breath. Noted to be a smoker currently on nicotine replacement patch.    PLAN:     Continue on aspirin, plavix, atorvastatin for presumed CAD and lipid-lowering management. Stay on carvedilol and lisinopril for dilated cardiomyopathy. Continue on heparin infusion for non-STEMI management. Reasonable to continue Lasix 40 mg daily while also monitoring electrolytes and renal function. Continue on smoking cessation with nicotine replacement patch. Continue with mood medications and HAART. Goals of care to also be determined by next of kin if possible. Probably no aggressive cardiac measures at present. May consider lexiscan nuclear stress testing to help further cardiac risk stratify.      Todd Lamas MD, FACC, FASE, FASNC, FACP  Director, Heart Failure Services  [facility]  , Department of Cardiology  [facility]

## 2020-11-07 NOTE — PROGRESS NOTE ADULT - SUBJECTIVE AND OBJECTIVE BOX
Patient is a 80y old  Female who presents with a chief complaint of Altered Mental Status (2020 19:22)    HPI:   81yo female with unknown pmh [place]. Pt gave name and . Pt is AOX2, shivering but otherwise deneis any complaints. Police currently attempting to look for family.    	No fever/chills, No photophobia/eye pain/changes in vision, No ear pain/sore throat/dysphagia, No chest pain/palpitations, no SOB/cough, no wheeze/stridor, No abdominal pain, No N/V/D, no dysuria/frequency/discharge, No neck/back pain, no rash, no changes in neurological status/function.   (2020 14:02)    SUBJECTIVE & OBJECTIVE: Pt seen and examined at bedside. Episodes of bradycardia to 40's on monitor  PHYSICAL EXAM:  ICU Vital Signs Last 24 Hrs  T(C): 36.7 (2020 05:46), Max: 36.7 (2020 05:46)  T(F): 98 (2020 05:46), Max: 98 (2020 05:46)  HR: 66 (2020 05:46) (54 - 66)  BP: 144/78 (2020 05:46) (111/62 - 144/78)  BP(mean): --  ABP: --  ABP(mean): --  RR: 18 (2020 05:46) (17 - 18)  SpO2: 96% (2020 05:46) (94% - 96%)  Daily     Daily Weight in k.3 (2020 05:46)  I&O's Detail    2020 07:01  -  2020 07:00  --------------------------------------------------------  IN:    Heparin Infusion: 72 mL    Oral Fluid: 960 mL  Total IN: 1032 mL    OUT:  Total OUT: 0 mL    Total NET: 1032 mL        GENERAL: NAD, well-groomed, well-developed  HEAD:  Atraumatic, Normocephalic  EYES: EOMI, PERRLA, conjunctiva and sclera clear  ENMT: Moist mucous membranes  NECK: Supple, No JVD  NERVOUS SYSTEM:  Alert and very confused, Motor Strength 5/5 B/L upper and lower extremities; DTRs 2+ intact and symmetric  CHEST/LUNG: Clear to auscultation bilaterally; No rales, rhonchi, wheezing, or rubs  HEART: Regular rate and rhythm; No murmurs, rubs, or gallops  ABDOMEN: Soft, Nontender, Nondistended; Bowel sounds present  EXTREMITIES:  2+ Peripheral Pulses, No clubbing, cyanosis, or edema  MEDICATIONS  (STANDING):  aspirin 325 milliGRAM(s) Oral daily  atorvastatin 40 milliGRAM(s) Oral at bedtime  carvedilol 3.125 milliGRAM(s) Oral every 12 hours  clopidogrel Tablet 75 milliGRAM(s) Oral daily  donepezil 5 milliGRAM(s) Oral at bedtime  furosemide    Tablet 40 milliGRAM(s) Oral daily  heparin  Infusion. 600 Unit(s)/Hr (6 mL/Hr) IV Continuous <Continuous>  lisinopril 5 milliGRAM(s) Oral daily  lopinavir 200 mG/ritonavir 50 mG 1 Tablet(s) Oral every 12 hours  melatonin 3 milliGRAM(s) Oral at bedtime  memantine 10 milliGRAM(s) Oral two times a day  nicotine -   7 mG/24Hr(s) Patch 1 patch Transdermal daily    MEDICATIONS  (PRN):  heparin   Injectable 3200 Unit(s) IV Push every 6 hours PRN For aPTT less than 40    LABS:                        10.4   4.91  )-----------( 186      ( 2020 06:37 )             34.0     11-06    143  |  110<H>  |  16  ----------------------------<  107<H>  3.8   |  24  |  0.89    Ca    8.4<L>      2020 11:47  Phos  2.7     11-06  Mg     2.1     11-06      PT/INR - ( 2020 14:59 )   PT: 13.1 sec;   INR: 1.14 ratio         PTT - ( 2020 06:37 )  PTT:55.6 sec  Urinalysis Basic - ( 2020 22:42 )    Color: Yellow / Appearance: Clear / S.025 / pH: x  Gluc: x / Ketone: Trace  / Bili: Negative / Urobili: 1 mg/dL   Blood: x / Protein: 30 mg/dL / Nitrite: Negative   Leuk Esterase: Trace / RBC: 0-2 /HPF / WBC 0-2   Sq Epi: x / Non Sq Epi: Few / Bacteria: Few      CAPILLARY BLOOD GLUCOSE          CARDIAC MARKERS ( 2020 11:47 )  14.600 ng/mL / x     / x     / x     / x      CARDIAC MARKERS ( 2020 03:54 )  21.900 ng/mL / x     / x     / x     / x      CARDIAC MARKERS ( 2020 21:22 )  24.200 ng/mL / x     / x     / x     / x      CARDIAC MARKERS ( 2020 14:59 )  27.200 ng/mL / x     / 623 U/L / x     / 14.1 ng/mL        RECENT CULTURES:    RADIOLOGY & ADDITIONAL TESTS:    DVT/GI ppx  Discussed with pt @ bedside

## 2020-11-07 NOTE — PROGRESS NOTE ADULT - ASSESSMENT
Subjective Complaints:  Historian:             Vital Signs Last 24 Hrs  T(C): 36.7 (2020 16:11), Max: 36.7 (2020 05:46)  T(F): 98 (2020 16:11), Max: 98 (2020 05:46)  HR: 57 (2020 16:11) (52 - 66)  BP: 121/76 (2020 16:11) (108/69 - 144/78)  BP(mean): --  RR: 18 (2020 16:11) (18 - 18)  SpO2: 96% (2020 16:11) (94% - 96%)    GENERAL PHYSICAL EXAM:  General:  Appears stated age, well-groomed, well-nourished, no distress  HEENT:  NC/AT, patent nares w/ pink mucosa, OP clear w/o lesions, PERRL, EOMI, conjunctivae clear, no thyromegaly, nodules, adenopathy, no JVD  Chest:  Full & symmetric excursion, no increased effort, breath sounds clear  Cardiovascular:  Regular rhythm, S1, S2, no murmur/rub/S3/S4, no carotid/femoral/abdominal bruit, radial/pedal pulses 2+, no edema  Abdomen:  Soft, non-tender, non-distended, normoactive bowel sounds, no HSM  Extremities:  Gait & station:   Digits:   Nails:   Joints, Bones, Muscles:   ROM:   Stability:  Skin:  No rash/erythema/ecchymoses/petechiae/wounds/abscess/warm/dry  Musculoskeletal:  Full ROM in all joints w/o swelling/tenderness/effusion        LABS:                        10.4   4.91  )-----------( 186      ( 2020 06:37 )             34.0     11-06    143  |  110<H>  |  16  ----------------------------<  107<H>  3.8   |  24  |  0.89    Ca    8.4<L>      2020 11:47  Phos  2.7     11-06  Mg     2.1     11-06      PTT - ( 2020 06:37 )  PTT:55.6 sec  Urinalysis Basic - ( 2020 22:42 )    Color: Yellow / Appearance: Clear / S.025 / pH: x  Gluc: x / Ketone: Trace  / Bili: Negative / Urobili: 1 mg/dL   Blood: x / Protein: 30 mg/dL / Nitrite: Negative   Leuk Esterase: Trace / RBC: 0-2 /HPF / WBC 0-2   Sq Epi: x / Non Sq Epi: Few / Bacteria: Few        RADIOLOGY & ADDITIONAL STUDIES:        Neurology Progress Note:      Mental Status: awaek alert speech fluent  folows commands       Cranial Nerves: 2 1/2 intact      Motor:   arm leg 3/5         Sensory: intact      Cerebellar: defrd      Gait: unsteady       Assesment/Plan: s/p co nfusion change of mental status for mri malik r/o bryanna occipital infarction  on asa  tsh b12 for pt will follow

## 2020-11-08 LAB
ANION GAP SERPL CALC-SCNC: 5 MMOL/L — SIGNIFICANT CHANGE UP (ref 5–17)
APTT BLD: 61.5 SEC — HIGH (ref 27.5–35.5)
BUN SERPL-MCNC: 15 MG/DL — SIGNIFICANT CHANGE UP (ref 7–23)
CALCIUM SERPL-MCNC: 8.5 MG/DL — SIGNIFICANT CHANGE UP (ref 8.5–10.1)
CHLORIDE SERPL-SCNC: 109 MMOL/L — HIGH (ref 96–108)
CO2 SERPL-SCNC: 26 MMOL/L — SIGNIFICANT CHANGE UP (ref 22–31)
CREAT SERPL-MCNC: 0.91 MG/DL — SIGNIFICANT CHANGE UP (ref 0.5–1.3)
CULTURE RESULTS: SIGNIFICANT CHANGE UP
GLUCOSE SERPL-MCNC: 85 MG/DL — SIGNIFICANT CHANGE UP (ref 70–99)
HCT VFR BLD CALC: 34.1 % — LOW (ref 34.5–45)
HGB BLD-MCNC: 10.4 G/DL — LOW (ref 11.5–15.5)
MCHC RBC-ENTMCNC: 23.7 PG — LOW (ref 27–34)
MCHC RBC-ENTMCNC: 30.5 GM/DL — LOW (ref 32–36)
MCV RBC AUTO: 77.9 FL — LOW (ref 80–100)
NRBC # BLD: 0 /100 WBCS — SIGNIFICANT CHANGE UP (ref 0–0)
OB PNL STL: NEGATIVE — SIGNIFICANT CHANGE UP
PLATELET # BLD AUTO: 170 K/UL — SIGNIFICANT CHANGE UP (ref 150–400)
POTASSIUM SERPL-MCNC: 3.4 MMOL/L — LOW (ref 3.5–5.3)
POTASSIUM SERPL-SCNC: 3.4 MMOL/L — LOW (ref 3.5–5.3)
RBC # BLD: 4.38 M/UL — SIGNIFICANT CHANGE UP (ref 3.8–5.2)
RBC # FLD: 13.5 % — SIGNIFICANT CHANGE UP (ref 10.3–14.5)
SODIUM SERPL-SCNC: 140 MMOL/L — SIGNIFICANT CHANGE UP (ref 135–145)
SPECIMEN SOURCE: SIGNIFICANT CHANGE UP
TROPONIN I SERPL-MCNC: 9.43 NG/ML — HIGH (ref 0.01–0.04)
WBC # BLD: 4.99 K/UL — SIGNIFICANT CHANGE UP (ref 3.8–10.5)
WBC # FLD AUTO: 4.99 K/UL — SIGNIFICANT CHANGE UP (ref 3.8–10.5)

## 2020-11-08 PROCEDURE — 99233 SBSQ HOSP IP/OBS HIGH 50: CPT

## 2020-11-08 RX ORDER — POTASSIUM CHLORIDE 20 MEQ
40 PACKET (EA) ORAL ONCE
Refills: 0 | Status: COMPLETED | OUTPATIENT
Start: 2020-11-08 | End: 2020-11-08

## 2020-11-08 RX ORDER — REGADENOSON 0.08 MG/ML
0.4 INJECTION, SOLUTION INTRAVENOUS ONCE
Refills: 0 | Status: DISCONTINUED | OUTPATIENT
Start: 2020-11-08 | End: 2020-11-12

## 2020-11-08 RX ADMIN — Medication 3 MILLIGRAM(S): at 21:49

## 2020-11-08 RX ADMIN — Medication 325 MILLIGRAM(S): at 11:57

## 2020-11-08 RX ADMIN — LISINOPRIL 5 MILLIGRAM(S): 2.5 TABLET ORAL at 05:34

## 2020-11-08 RX ADMIN — CLOPIDOGREL BISULFATE 75 MILLIGRAM(S): 75 TABLET, FILM COATED ORAL at 11:57

## 2020-11-08 RX ADMIN — Medication 1 PATCH: at 11:27

## 2020-11-08 RX ADMIN — Medication 1 PATCH: at 19:54

## 2020-11-08 RX ADMIN — Medication 1 PATCH: at 11:57

## 2020-11-08 RX ADMIN — HEPARIN SODIUM 600 UNIT(S)/HR: 5000 INJECTION INTRAVENOUS; SUBCUTANEOUS at 05:34

## 2020-11-08 RX ADMIN — Medication 1 TABLET(S): at 05:34

## 2020-11-08 RX ADMIN — Medication 1 PATCH: at 07:19

## 2020-11-08 RX ADMIN — Medication 40 MILLIGRAM(S): at 05:34

## 2020-11-08 RX ADMIN — ATORVASTATIN CALCIUM 40 MILLIGRAM(S): 80 TABLET, FILM COATED ORAL at 21:49

## 2020-11-08 RX ADMIN — DONEPEZIL HYDROCHLORIDE 5 MILLIGRAM(S): 10 TABLET, FILM COATED ORAL at 21:49

## 2020-11-08 RX ADMIN — Medication 1 TABLET(S): at 18:55

## 2020-11-08 RX ADMIN — MEMANTINE HYDROCHLORIDE 10 MILLIGRAM(S): 10 TABLET ORAL at 05:34

## 2020-11-08 RX ADMIN — CARVEDILOL PHOSPHATE 3.12 MILLIGRAM(S): 80 CAPSULE, EXTENDED RELEASE ORAL at 05:34

## 2020-11-08 RX ADMIN — MEMANTINE HYDROCHLORIDE 10 MILLIGRAM(S): 10 TABLET ORAL at 17:20

## 2020-11-08 RX ADMIN — Medication 40 MILLIEQUIVALENT(S): at 21:49

## 2020-11-08 NOTE — PROGRESS NOTE ADULT - SUBJECTIVE AND OBJECTIVE BOX
Patient is a 80y old  Female who presents with a chief complaint of Altered Mental Status (2020 17:57)    HPI:   79yo female with unknown pmh [place]. Pt gave name and . Pt is AOX2, shivering but otherwise deneis any complaints. Police currently attempting to look for family.    	No fever/chills, No photophobia/eye pain/changes in vision, No ear pain/sore throat/dysphagia, No chest pain/palpitations, no SOB/cough, no wheeze/stridor, No abdominal pain, No N/V/D, no dysuria/frequency/discharge, No neck/back pain, no rash, no changes in neurological status/function.   (2020 14:02)    SUBJECTIVE & OBJECTIVE: Pt seen and examined at bedside.   PHYSICAL EXAM:  ICU Vital Signs Last 24 Hrs  T(C): 36.4 (2020 16:09), Max: 36.8 (2020 05:23)  T(F): 97.6 (2020 16:09), Max: 98.3 (2020 05:23)  HR: 55 (2020 16:09) (43 - 76)  BP: 117/75 (2020 16:09) (117/71 - 138/80)  BP(mean): --  ABP: --  ABP(mean): --  RR: 17 (2020 16:09) (17 - 18)  SpO2: 96% (2020 16:09) (95% - 98%)  Daily     Daily Weight in k (2020 05:23)  I&O's Detail    2020 07:01  -  2020 07:00  --------------------------------------------------------  IN:    Heparin Infusion: 72 mL    Oral Fluid: 240 mL  Total IN: 312 mL    OUT:  Total OUT: 0 mL    Total NET: 312 mL        GENERAL: NAD, well-groomed, well-developed  HEAD:  Atraumatic, Normocephalic  EYES: EOMI, PERRLA, conjunctiva and sclera clear  ENMT: Moist mucous membranes  NECK: Supple, No JVD  NERVOUS SYSTEM:  Alert & confused4, Motor Strength 5/5 B/L upper and lower extremities; DTRs 2+ intact and symmetric  CHEST/LUNG: Clear to auscultation bilaterally; No rales, rhonchi, wheezing, or rubs  HEART: Regular rate and rhythm; No murmurs, rubs, or gallops  ABDOMEN: Soft, Nontender, Nondistended; Bowel sounds present  EXTREMITIES:  2+ Peripheral Pulses, No clubbing, cyanosis, or edema  MEDICATIONS  (STANDING):  aspirin 325 milliGRAM(s) Oral daily  atorvastatin 40 milliGRAM(s) Oral at bedtime  carvedilol 3.125 milliGRAM(s) Oral every 12 hours  clopidogrel Tablet 75 milliGRAM(s) Oral daily  donepezil 5 milliGRAM(s) Oral at bedtime  furosemide    Tablet 40 milliGRAM(s) Oral daily  heparin  Infusion. 600 Unit(s)/Hr (6 mL/Hr) IV Continuous <Continuous>  lisinopril 5 milliGRAM(s) Oral daily  lopinavir 200 mG/ritonavir 50 mG 1 Tablet(s) Oral every 12 hours  melatonin 3 milliGRAM(s) Oral at bedtime  memantine 10 milliGRAM(s) Oral two times a day  nicotine -   7 mG/24Hr(s) Patch 1 patch Transdermal daily  regadenoson Injectable 0.4 milliGRAM(s) IV Push once    MEDICATIONS  (PRN):  heparin   Injectable 3200 Unit(s) IV Push every 6 hours PRN For aPTT less than 40    LABS:                        10.4   4.99  )-----------( 170      ( 2020 03:57 )             34.1     11-08    140  |  109<H>  |  15  ----------------------------<  85  3.4<L>   |  26  |  0.91    Ca    8.5      2020 03:57      PTT - ( 2020 03:57 )  PTT:61.5 sec    CAPILLARY BLOOD GLUCOSE          CARDIAC MARKERS ( 2020 03:57 )  9.430 ng/mL / x     / x     / x     / x      CARDIAC MARKERS ( 2020 20:32 )  9.300 ng/mL / x     / x     / x     / x      CARDIAC MARKERS ( 2020 12:50 )  7.400 ng/mL / x     / x     / x     / x            RECENT CULTURES:    RADIOLOGY & ADDITIONAL TESTS:    DVT/GI ppx  Discussed with pt @ bedside

## 2020-11-08 NOTE — PROGRESS NOTE ADULT - SUBJECTIVE AND OBJECTIVE BOX
HPI:  72-year-old female with unclear medical history found along highway confused. Seen to have left bundle branch block of unclear duration and echo reveals severe global dilated cardiomyopathy. Also non-STEMI noted with peak troponin of approximately 27. Seen ambulating today without chest complaints or shortness of breath. Noted to be a smoker currently on nicotine replacement patch.      REVIEW OF SYSTEMS:  confused    PHYSICAL EXAM:  Vital Signs Last 24 Hrs  T(C): 36.4 (08 Nov 2020 16:09), Max: 36.8 (08 Nov 2020 05:23)  T(F): 97.6 (08 Nov 2020 16:09), Max: 98.3 (08 Nov 2020 05:23)  HR: 55 (08 Nov 2020 16:09) (43 - 76)  BP: 117/75 (08 Nov 2020 16:09) (117/71 - 138/80)  RR: 17 (08 Nov 2020 16:09) (17 - 18)  SpO2: 96% (08 Nov 2020 16:09) (95% - 98%)    Tele: SR, LBBB, isolated PVCs    Constitutional: no deformities and no acute distress.   Eyes: the conjunctiva exhibited no abnormalities and the eyelids demonstrated no xanthelasmas.   HEENT: normal oral mucosa, no oral pallor and no oral cyanosis.   Neck: normal jugular venous A waves present, normal jugular venous V waves present and no jugular venous chaparro A waves.   Pulmonary: no respiratory distress, normal respiratory rhythm and effort, no accessory muscle use and lungs were clear to auscultation bilaterally.   Cardiovascular: heart rate and rhythm were normal, normal S1 and S2 and no murmur, gallop, rub, heave or thrill are present.   Abdomen: soft, non-tender  Musculoskeletal: the gait could not be assessed.   Extremities: no clubbing of the fingernails, no localized cyanosis, no petechial hemorrhages and no ischemic changes.   Skin: normal skin color and pigmentation, no rash, no venous stasis, no skin lesions, no skin ulcer and no xanthoma was observed.   Psychiatric: oriented to person.    LABORATORY:                                   10.4   4.99  )-----------( 170      ( 08 Nov 2020 03:57 )             34.1       11-08    140  |  109<H>  |  15  ----------------------------<  85  3.4<L>   |  26  |  0.91    Ca    8.5      08 Nov 2020 03:57      CARDIAC MARKERS ( 06 Nov 2020 11:47 )  14.600 ng/mL / x     / x     / x     / x      CARDIAC MARKERS ( 06 Nov 2020 03:54 )  21.900 ng/mL / x     / x     / x     / x      CARDIAC MARKERS ( 05 Nov 2020 21:22 )  24.200 ng/mL / x     / x     / x     / x      CARDIAC MARKERS ( 05 Nov 2020 14:59 )  27.200 ng/mL / x     / 623 U/L / x     / 14.1 ng/mL  CARDIAC MARKERS ( 05 Nov 2020 09:03 )  24.000 ng/mL / x     / x     / x     / x            IMAGING:    < from: 12 Lead ECG (11.05.20 @ 09:10) >  Sinus rhythm with frequent premature ventricular complexes  Possible Left atrial enlargement  Left axis deviation  Left bundle branch block  Abnormal ECG    < end of copied text >      < from: Xray Chest 1 View- PORTABLE-Urgent (Xray Chest 1 View- PORTABLE-Urgent .) (11.05.20 @ 14:58) >  Impression: Cardiomegaly. Probable intrathoracic thyroid. Generalized nonspecific interstitial prominence.    < end of copied text >      < from: CT Head No Cont (11.05.20 @ 13:00) >  1)  late subacute or chronic appearing bilateral occipital/PCA territory infarcts. This may be further assessed with MR imaging.  2)  chronic ischemic changes also noted in both hemispheres as well as within the posterior fossa. No hemorrhagiclesion or mass noted.    < end of copied text >      < from: TTE Echo Complete w/o Contrast w/ Doppler (11.05.20 @ 17:09) >  Summary:   1. Left ventricular ejection fraction, by visual estimation, is <20%.   2. Technically good study.   3. Severely decreased global left ventricular systolic function.   4. Basal and mid inferior wall and basal and mid inferolateral wall are abnormal as described above.   5. Dilated cardiomyopathy.   6. Moderately increased left ventricular internal cavity size.   7. Spectral Doppler shows impaired relaxation pattern of left ventricular myocardial filling (Grade I diastolic dysfunction).   8. Mildly reduced RV systolic function.   9. Mildly enlarged left atrium.  10. Normal right atrial size.  11. There is no evidence of pericardial effusion.  12. Mild mitral valve regurgitation.  13. Mild The mitral valve leaflets are tethered which is due to reduced systolic function and elevated LVDP.  14. Mild tricuspid regurgitation.  15. Sclerotic aortic valve with normal opening.  16.Estimated pulmonary artery systolic pressure is 38.1 mmHg assuming a right atrial pressure of 10 mmHg, which is consistent with borderline pulmonary hypertension.  17. There is mild aortic root calcification.    < end of copied text >    ASSESSMENT:  80-year-old female with unclear medical history found along highway confused. Seen to have left bundle branch block of unclear duration and echo reveals severe global dilated cardiomyopathy. Also non-STEMI noted with peak troponin of approximately 27. Seen ambulating today without chest complaints or shortness of breath. Noted to be a smoker currently on nicotine replacement patch.    PLAN:     Continue on aspirin, plavix, atorvastatin for presumed CAD and lipid-lowering management. Stay on carvedilol and lisinopril for dilated cardiomyopathy. Continue on heparin infusion for non-STEMI management. Reasonable to continue Lasix 40 mg daily while also monitoring electrolytes and renal function. Continue on smoking cessation with nicotine replacement patch. Continue with mood medications and HAART. Goals of care to also be determined by next of kin if possible. Probably no aggressive cardiac measures at present. May consider lexiscan nuclear stress testing to help further cardiac risk stratify.    MEDICATIONS  (STANDING):  aspirin 325 milliGRAM(s) Oral daily  atorvastatin 40 milliGRAM(s) Oral at bedtime  carvedilol 3.125 milliGRAM(s) Oral every 12 hours  clopidogrel Tablet 75 milliGRAM(s) Oral daily  donepezil 5 milliGRAM(s) Oral at bedtime  furosemide    Tablet 40 milliGRAM(s) Oral daily  heparin  Infusion. 600 Unit(s)/Hr (6 mL/Hr) IV Continuous <Continuous>  lisinopril 5 milliGRAM(s) Oral daily  lopinavir 200 mG/ritonavir 50 mG 1 Tablet(s) Oral every 12 hours  melatonin 3 milliGRAM(s) Oral at bedtime  memantine 10 milliGRAM(s) Oral two times a day  nicotine -   7 mG/24Hr(s) Patch 1 patch Transdermal daily      Todd Lamas MD, FACC, FASE, FASNC, FACP  Director, Heart Failure Services  [facility]  , Department of Cardiology  [facility]     HPI:  72-year-old female with unclear medical history [place] highway confused. Seen to have left bundle branch block of unclear duration and echo reveals severe global dilated cardiomyopathy. Also non-STEMI noted with peak troponin of approximately 27. Seen ambulating today without chest complaints or shortness of breath. Noted to be a smoker currently on nicotine replacement patch.      REVIEW OF SYSTEMS:  confused    PHYSICAL EXAM:  Vital Signs Last 24 Hrs  T(C): 36.4 (08 Nov 2020 16:09), Max: 36.8 (08 Nov 2020 05:23)  T(F): 97.6 (08 Nov 2020 16:09), Max: 98.3 (08 Nov 2020 05:23)  HR: 55 (08 Nov 2020 16:09) (43 - 76)  BP: 117/75 (08 Nov 2020 16:09) (117/71 - 138/80)  RR: 17 (08 Nov 2020 16:09) (17 - 18)  SpO2: 96% (08 Nov 2020 16:09) (95% - 98%)    Tele: SR, LBBB, isolated PVCs    Constitutional: no deformities and no acute distress.   Eyes: the conjunctiva exhibited no abnormalities and the eyelids demonstrated no xanthelasmas.   HEENT: normal oral mucosa, no oral pallor and no oral cyanosis.   Neck: normal jugular venous A waves present, normal jugular venous V waves present and no jugular venous chaparro A waves.   Pulmonary: no respiratory distress, normal respiratory rhythm and effort, no accessory muscle use and lungs were clear to auscultation bilaterally.   Cardiovascular: heart rate and rhythm were normal, normal S1 and S2 and no murmur, gallop, rub, heave or thrill are present.   Abdomen: soft, non-tender  Musculoskeletal: the gait could not be assessed.   Extremities: no clubbing of the fingernails, no localized cyanosis, no petechial hemorrhages and no ischemic changes.   Skin: normal skin color and pigmentation, no rash, no venous stasis, no skin lesions, no skin ulcer and no xanthoma was observed.   Psychiatric: oriented to person.    LABORATORY:                                   10.4   4.99  )-----------( 170      ( 08 Nov 2020 03:57 )             34.1       11-08    140  |  109<H>  |  15  ----------------------------<  85  3.4<L>   |  26  |  0.91    Ca    8.5      08 Nov 2020 03:57      CARDIAC MARKERS ( 06 Nov 2020 11:47 )  14.600 ng/mL / x     / x     / x     / x      CARDIAC MARKERS ( 06 Nov 2020 03:54 )  21.900 ng/mL / x     / x     / x     / x      CARDIAC MARKERS ( 05 Nov 2020 21:22 )  24.200 ng/mL / x     / x     / x     / x      CARDIAC MARKERS ( 05 Nov 2020 14:59 )  27.200 ng/mL / x     / 623 U/L / x     / 14.1 ng/mL  CARDIAC MARKERS ( 05 Nov 2020 09:03 )  24.000 ng/mL / x     / x     / x     / x            IMAGING:    < from: 12 Lead ECG (11.05.20 @ 09:10) >  Sinus rhythm with frequent premature ventricular complexes  Possible Left atrial enlargement  Left axis deviation  Left bundle branch block  Abnormal ECG    < end of copied text >      < from: Xray Chest 1 View- PORTABLE-Urgent (Xray Chest 1 View- PORTABLE-Urgent .) (11.05.20 @ 14:58) >  Impression: Cardiomegaly. Probable intrathoracic thyroid. Generalized nonspecific interstitial prominence.    < end of copied text >      < from: CT Head No Cont (11.05.20 @ 13:00) >  1)  late subacute or chronic appearing bilateral occipital/PCA territory infarcts. This may be further assessed with MR imaging.  2)  chronic ischemic changes also noted in both hemispheres as well as within the posterior fossa. No hemorrhagiclesion or mass noted.    < end of copied text >      < from: TTE Echo Complete w/o Contrast w/ Doppler (11.05.20 @ 17:09) >  Summary:   1. Left ventricular ejection fraction, by visual estimation, is <20%.   2. Technically good study.   3. Severely decreased global left ventricular systolic function.   4. Basal and mid inferior wall and basal and mid inferolateral wall are abnormal as described above.   5. Dilated cardiomyopathy.   6. Moderately increased left ventricular internal cavity size.   7. Spectral Doppler shows impaired relaxation pattern of left ventricular myocardial filling (Grade I diastolic dysfunction).   8. Mildly reduced RV systolic function.   9. Mildly enlarged left atrium.  10. Normal right atrial size.  11. There is no evidence of pericardial effusion.  12. Mild mitral valve regurgitation.  13. Mild The mitral valve leaflets are tethered which is due to reduced systolic function and elevated LVDP.  14. Mild tricuspid regurgitation.  15. Sclerotic aortic valve with normal opening.  16.Estimated pulmonary artery systolic pressure is 38.1 mmHg assuming a right atrial pressure of 10 mmHg, which is consistent with borderline pulmonary hypertension.  17. There is mild aortic root calcification.    < end of copied text >    ASSESSMENT:  80-year-old female with unclear medical history [place] highway confused. Seen to have left bundle branch block of unclear duration and echo reveals severe global dilated cardiomyopathy. Also non-STEMI noted with peak troponin of approximately 27. Seen ambulating today without chest complaints or shortness of breath. Noted to be a smoker currently on nicotine replacement patch.    PLAN:     Continue on aspirin and Plavix for dual antiplatelet therapy and presumed CAD management. Continue on lisinopril and Coreg for dilated cardiomyopathy treatment. Maintain heparin infusion for non-STEMI care & continue Lasix 40 mg daily. Smoking cessation was encouraged . Background therapy with HAART to be continued. Would get Lexiscan nuclear stress test to help better cardiac risk assessment. Goals of care to be determined and likely no aggressive cardiac measures now.    Todd Lamas MD, FACC, CAIN, PATRICIA, FACP  Director, Heart Failure Services  [facility]  , Department of Cardiology  [facility]

## 2020-11-08 NOTE — PROGRESS NOTE ADULT - ASSESSMENT
80 years old female with history of dementia and Hypertension and diabetes wfound with wandering with cerebrovascular accident seeen and lbbb with very elevated trop     Problem/Plan - 1:  ·  Problem: Systolic Heart Failure   - EF < 20 % and patient is bradycardic.  Decreased Carvedilol to 3.125 mg BID with appropriate holds  - repeat ekg now  - Cardiology eval appreciated  - pateint is confused and cannot make her own decisions.  If there is now HCP or family to make patient's decision will need 2 PC consent for any planned procedure   - for nuclear stress    Problem/Plan - 2:  ·  Problem: Anemia  - check occult blood  - hgb on previous admission 11 --> 10  - MCV decreased  - check iron studies     Problem/Plan - 2:  ·  Problem: NSTEMI (non-ST elevated myocardial infarction).    Plan:   - heparin gtt  - check am EKG.  EKG in ED demonstrated LBBB, unsure as to whether this is a new finding.  Am ekg unchanged  - TTE demonstrates low EF  - continue to trend troponin     Problem/Plan - 3:  ·  Problem: CVA (cerebral vascular accident).  Plan: Carotids wnl  - for MR head today  - Neurology eval appreciated     Problem/Plan - 4:  ·  Problem: HIV?  - unsure if this is and erroneous addition on admission  - check HIV and viral load  - also call pharmacy to check patients medications     Problem/Plan - 5:  ·  Problem: Essential hypertension.  Plan: continue home meds.

## 2020-11-08 NOTE — PROGRESS NOTE ADULT - ASSESSMENT
Subjective Complaints:  Historian:             Vital Signs Last 24 Hrs  T(C): 36.4 (08 Nov 2020 16:09), Max: 36.8 (08 Nov 2020 05:23)  T(F): 97.6 (08 Nov 2020 16:09), Max: 98.3 (08 Nov 2020 05:23)  HR: 55 (08 Nov 2020 16:09) (43 - 76)  BP: 117/75 (08 Nov 2020 16:09) (117/71 - 138/80)  BP(mean): --  RR: 17 (08 Nov 2020 16:09) (17 - 18)  SpO2: 96% (08 Nov 2020 16:09) (95% - 98%)    GENERAL PHYSICAL EXAM:  General:  Appears stated age, well-groomed, well-nourished, no distress  HEENT:  NC/AT, patent nares w/ pink mucosa, OP clear w/o lesions, PERRL, EOMI, conjunctivae clear, no thyromegaly, nodules, adenopathy, no JVD  Chest:  Full & symmetric excursion, no increased effort, breath sounds clear  Cardiovascular:  Regular rhythm, S1, S2, no murmur/rub/S3/S4, no carotid/femoral/abdominal bruit, radial/pedal pulses 2+, no edema  Abdomen:  Soft, non-tender, non-distended, normoactive bowel sounds, no HSM  Extremities:  Gait & station:   Digits:   Nails:   Joints, Bones, Muscles:   ROM:   Stability:  Skin:  No rash/erythema/ecchymoses/petechiae/wounds/abscess/warm/dry  Musculoskeletal:  Full ROM in all joints w/o swelling/tenderness/effusion        LABS:                        10.4   4.99  )-----------( 170      ( 08 Nov 2020 03:57 )             34.1     11-08    140  |  109<H>  |  15  ----------------------------<  85  3.4<L>   |  26  |  0.91    Ca    8.5      08 Nov 2020 03:57      PTT - ( 08 Nov 2020 03:57 )  PTT:61.5 sec      RADIOLOGY & ADDITIONAL STUDIES:        Neurology Progress Note:      Mental Status: awake  alert speech fluent folows commands       Cranial Nerves: 2 1/2 intact  visual field full       Motor:   arm leg 4/5         Sensory:intact      Cerebellar: defrd      Gait:defrd      Assesment/Plan:  s/p confusion low ef 20 percent  ct head bryanna small occipital infarct  for mri malik on asa  no seizure

## 2020-11-09 LAB
ANION GAP SERPL CALC-SCNC: 1 MMOL/L — LOW (ref 5–17)
APTT BLD: 51.3 SEC — HIGH (ref 27.5–35.5)
APTT BLD: 77.2 SEC — HIGH (ref 27.5–35.5)
BUN SERPL-MCNC: 15 MG/DL — SIGNIFICANT CHANGE UP (ref 7–23)
CALCIUM SERPL-MCNC: 8.8 MG/DL — SIGNIFICANT CHANGE UP (ref 8.5–10.1)
CHLORIDE SERPL-SCNC: 106 MMOL/L — SIGNIFICANT CHANGE UP (ref 96–108)
CO2 SERPL-SCNC: 32 MMOL/L — HIGH (ref 22–31)
CREAT SERPL-MCNC: 1 MG/DL — SIGNIFICANT CHANGE UP (ref 0.5–1.3)
GLUCOSE SERPL-MCNC: 85 MG/DL — SIGNIFICANT CHANGE UP (ref 70–99)
HCT VFR BLD CALC: 34.4 % — LOW (ref 34.5–45)
HGB BLD-MCNC: 10.7 G/DL — LOW (ref 11.5–15.5)
HIV 1+2 AB+HIV1 P24 AG SERPL QL IA: SIGNIFICANT CHANGE UP
MCHC RBC-ENTMCNC: 23.9 PG — LOW (ref 27–34)
MCHC RBC-ENTMCNC: 31.1 GM/DL — LOW (ref 32–36)
MCV RBC AUTO: 77 FL — LOW (ref 80–100)
NRBC # BLD: 0 /100 WBCS — SIGNIFICANT CHANGE UP (ref 0–0)
PLATELET # BLD AUTO: 197 K/UL — SIGNIFICANT CHANGE UP (ref 150–400)
POTASSIUM SERPL-MCNC: 3.9 MMOL/L — SIGNIFICANT CHANGE UP (ref 3.5–5.3)
POTASSIUM SERPL-SCNC: 3.9 MMOL/L — SIGNIFICANT CHANGE UP (ref 3.5–5.3)
RBC # BLD: 4.47 M/UL — SIGNIFICANT CHANGE UP (ref 3.8–5.2)
RBC # FLD: 13.6 % — SIGNIFICANT CHANGE UP (ref 10.3–14.5)
SODIUM SERPL-SCNC: 139 MMOL/L — SIGNIFICANT CHANGE UP (ref 135–145)
T PALLIDUM AB TITR SER: POSITIVE
WBC # BLD: 4.17 K/UL — SIGNIFICANT CHANGE UP (ref 3.8–10.5)
WBC # FLD AUTO: 4.17 K/UL — SIGNIFICANT CHANGE UP (ref 3.8–10.5)

## 2020-11-09 PROCEDURE — 99233 SBSQ HOSP IP/OBS HIGH 50: CPT

## 2020-11-09 RX ORDER — ASPIRIN/CALCIUM CARB/MAGNESIUM 324 MG
81 TABLET ORAL DAILY
Refills: 0 | Status: DISCONTINUED | OUTPATIENT
Start: 2020-11-09 | End: 2020-12-01

## 2020-11-09 RX ADMIN — HEPARIN SODIUM 700 UNIT(S)/HR: 5000 INJECTION INTRAVENOUS; SUBCUTANEOUS at 07:15

## 2020-11-09 RX ADMIN — Medication 3 MILLIGRAM(S): at 21:07

## 2020-11-09 RX ADMIN — Medication 325 MILLIGRAM(S): at 11:36

## 2020-11-09 RX ADMIN — Medication 1 PATCH: at 19:07

## 2020-11-09 RX ADMIN — Medication 1 TABLET(S): at 17:38

## 2020-11-09 RX ADMIN — Medication 40 MILLIGRAM(S): at 05:29

## 2020-11-09 RX ADMIN — Medication 1 PATCH: at 11:36

## 2020-11-09 RX ADMIN — DONEPEZIL HYDROCHLORIDE 5 MILLIGRAM(S): 10 TABLET, FILM COATED ORAL at 21:07

## 2020-11-09 RX ADMIN — MEMANTINE HYDROCHLORIDE 10 MILLIGRAM(S): 10 TABLET ORAL at 05:29

## 2020-11-09 RX ADMIN — Medication 1 TABLET(S): at 05:30

## 2020-11-09 RX ADMIN — MEMANTINE HYDROCHLORIDE 10 MILLIGRAM(S): 10 TABLET ORAL at 17:38

## 2020-11-09 RX ADMIN — LISINOPRIL 5 MILLIGRAM(S): 2.5 TABLET ORAL at 05:29

## 2020-11-09 RX ADMIN — HEPARIN SODIUM 650 UNIT(S)/HR: 5000 INJECTION INTRAVENOUS; SUBCUTANEOUS at 19:11

## 2020-11-09 RX ADMIN — ATORVASTATIN CALCIUM 40 MILLIGRAM(S): 80 TABLET, FILM COATED ORAL at 21:07

## 2020-11-09 RX ADMIN — CLOPIDOGREL BISULFATE 75 MILLIGRAM(S): 75 TABLET, FILM COATED ORAL at 11:36

## 2020-11-09 NOTE — PROGRESS NOTE ADULT - ASSESSMENT
80 years old female with history of dementia and Hypertension and diabetes was found with wandering with cerebrovascular accident seen and lbbb with elevated trop     Acute Systolic Heart Failure:  - EF < 20 % and patient is bradycardic.  Decreased Carvedilol to 3.125 mg BID with appropriate holds  - repeat ekg now  - Cardiology eval appreciated  - Nuclear stress test in am ( consult obtained from son)     Anemia:  - Microcytic anemia  - Neg occult blood  - Hb on previous admission 11 --> 10  - Iron studies pending    NSTEMI:  - heparin gtt  - ASA/ Plavix  - Cardio following    CVA:  - Carotids wnl. CR Angio of head and neck wasn't done  - for MR head today  - Neurology eval appreciated   - On ASA/ plavix/ Statin     HIV:  - Meds resumed  - HIV neg here   - Check viral load    Essential hypertension:  - Continue home meds.

## 2020-11-09 NOTE — PROGRESS NOTE ADULT - ASSESSMENT
ASSESSMENT:  80-year-old female with [place] medical [place]. Seen to have left bundle branch block of unclear duration and echo reveals severe global dilated cardiomyopathy. Also non-STEMI noted with peak troponin of approximately 27. Seen ambulating without chest complaints or shortness of breath. Noted to be a smoker currently on nicotine replacement patch.    PLAN:     -continue Telemetry  -NM Pharm Stress test pending for better cardiac risk assessment.  -Continue  aspirin and Plavix for dual antiplatelet therapy and presumed CAD management.   -Continue  Lisinopril and Coreg for dilated cardiomyopathy treatment.   -Maintain heparin infusion for non-STEMI care & continue Lasix 40 mg daily.   -Smoking cessation was encouraged . Background therapy with HAART to be continued.     -Goals of care to be determined and likely no aggressive cardiac measures now.

## 2020-11-09 NOTE — PROGRESS NOTE ADULT - SUBJECTIVE AND OBJECTIVE BOX
Patient is a 80y old  Female who presents with a chief complaint of Altered Mental Status (08 Nov 2020 21:07)      PAST MEDICAL & SURGICAL HISTORY:      INTERVAL HISTORY: Patient in bed, no c/o chest pain, dyspnea or palpitations.  	  MEDICATIONS:  MEDICATIONS  (STANDING):  aspirin 325 milliGRAM(s) Oral daily  atorvastatin 40 milliGRAM(s) Oral at bedtime  carvedilol 3.125 milliGRAM(s) Oral every 12 hours  clopidogrel Tablet 75 milliGRAM(s) Oral daily  donepezil 5 milliGRAM(s) Oral at bedtime  furosemide    Tablet 40 milliGRAM(s) Oral daily  heparin  Infusion. 600 Unit(s)/Hr (6 mL/Hr) IV Continuous <Continuous>  lisinopril 5 milliGRAM(s) Oral daily  lopinavir 200 mG/ritonavir 50 mG 1 Tablet(s) Oral every 12 hours  melatonin 3 milliGRAM(s) Oral at bedtime  memantine 10 milliGRAM(s) Oral two times a day  nicotine -   7 mG/24Hr(s) Patch 1 patch Transdermal daily  regadenoson Injectable 0.4 milliGRAM(s) IV Push once    MEDICATIONS  (PRN):  heparin   Injectable 3200 Unit(s) IV Push every 6 hours PRN For aPTT less than 40      Vitals:  T(F): 97.8 (11-09-20 @ 11:33), Max: 98.3 (11-08-20 @ 23:47)  HR: 65 (11-09-20 @ 11:33) (55 - 65)  BP: 136/88 (11-09-20 @ 11:33) (117/75 - 139/74)  RR: 19 (11-09-20 @ 11:33) (17 - 19)  SpO2: 97% (11-09-20 @ 11:33) (95% - 97%)  Wt(kg): --53.4 kg    11-08 @ 07:01  -  11-09 @ 07:00  --------------------------------------------------------  IN:    Oral Fluid: 240 mL  Total IN: 240 mL    OUT:  Total OUT: 0 mL    Total NET: 240 mL      PHYSICAL EXAM:  Neuro: Awake, responsive  CV: S1 S2 RRR  Lungs: CTABL  GI: Soft, BS +, ND, NT  Extremities: No edema    TELEMETRY: BBB  	    ECG:  < from: 12 Lead ECG (11.07.20 @ 12:20) >  Ventricular Rate 53 BPM    Atrial Rate 53 BPM    P-R Interval 172 ms    QRS Duration 180 ms    Q-T Interval 516 ms    QTC Calculation(Bazett) 484 ms    P Axis 53 degrees    R Axis -61 degrees    T Axis 89 degrees    Diagnosis Line Sinus bradycardia  Possible Left atrial enlargement  Left axis deviation  Left bundle branch block    < end of copied text >  	    RADIOLOGY:   < from: Xray Chest 1 View- PORTABLE-Urgent (Xray Chest 1 View- PORTABLE-Urgent .) (11.05.20 @ 14:58) >  INTERPRETATION:  Elevated troponin.    AP chest. No priors.    Heart enlarged despite AP projection. Calcified aortic arch. Mild deviation of the upper thoracic trachea to the left likely reflects enlarged thyroid. Recommend chest CT for additional evaluation. Mild generalized nonspecific interstitial prominence. Correlate clinically for mild congestion and/or chronic interstitial lung disease. Nofocal consolidation or significant pleural effusion.    Impression: Cardiomegaly. Probable intrathoracic thyroid. Generalized nonspecific interstitial prominence.    < end of copied text >    DIAGNOSTIC TESTING:    [x ] Echocardiogram:   < from: TTE Echo Complete w/o Contrast w/ Doppler (11.05.20 @ 17:09) >  Summary:   1. Left ventricular ejection fraction, by visual estimation, is <20%.   2. Technically good study.   3. Severely decreased global left ventricular systolic function.   4. Basal and mid inferior wall and basal and mid inferolateral wall are abnormal as described above.   5. Dilated cardiomyopathy.   6. Moderately increased left ventricular internal cavity size.   7. Spectral Doppler shows impaired relaxation pattern of left ventricular myocardial filling (Grade I diastolic dysfunction).   8. Mildly reduced RV systolic function.   9. Mildly enlarged left atrium.  10. Normal right atrial size.  11. There is no evidence of pericardial effusion.  12. Mild mitral valve regurgitation.  13. Mild The mitral valve leaflets are tethered which is due to reduced systolic function and elevated LVDP.  14. Mild tricuspid regurgitation.  15. Sclerotic aortic valve with normal opening.  16.Estimated pulmonary artery systolic pressure is 38.1 mmHg assuming a right atrial pressure of 10 mmHg, which is consistent with borderline pulmonary hypertension.  17. There is mild aortic root calcification.    < end of copied text >     [x ] Stress Test:  Pending       LABS:	 	    CARDIAC MARKERS:  Troponin I, Serum: 9.430 ng/mL (11-08 @ 03:57)  Troponin I, Serum: 9.300 ng/mL (11-07 @ 20:32)  Troponin I, Serum: 7.400 ng/mL (11-07 @ 12:50)      09 Nov 2020 10:12    139    |  106    |  15     ----------------------------<  85     3.9     |  32     |  1.00   08 Nov 2020 03:57    140    |  109    |  15     ----------------------------<  85     3.4     |  26     |  0.91     Ca    8.8        09 Nov 2020 10:12                            10.7   4.17  )-----------( 197      ( 09 Nov 2020 06:36 )             34.4 ,                       10.4   4.99  )-----------( 170      ( 08 Nov 2020 03:57 )             34.1 ,                       10.4   4.91  )-----------( 186      ( 07 Nov 2020 06:37 )             34.0   proBNP:   Lipid Profile: 11-07 @ 12:37  HDL/Total Cholesterol: --  HDL Chol:              58 mg/dL  Serum Chol:            127 mg/dL  Direct LDL:            --  Triglycerides:         50 mg/dL          PT/PTT- ( 09 Nov 2020 06:36 )   PT: x    ;  PTT: 51.3 sec

## 2020-11-09 NOTE — PROGRESS NOTE ADULT - SUBJECTIVE AND OBJECTIVE BOX
Patient is a 80y old  Female who presents with a chief complaint of Altered Mental Status (09 Nov 2020 14:02)    INTERVAL HPI/OVERNIGHT EVENTS:  Pt was seen and examined, no acute events.    MEDICATIONS  (STANDING):  aspirin 325 milliGRAM(s) Oral daily  atorvastatin 40 milliGRAM(s) Oral at bedtime  carvedilol 3.125 milliGRAM(s) Oral every 12 hours  clopidogrel Tablet 75 milliGRAM(s) Oral daily  donepezil 5 milliGRAM(s) Oral at bedtime  furosemide    Tablet 40 milliGRAM(s) Oral daily  heparin  Infusion. 600 Unit(s)/Hr (6 mL/Hr) IV Continuous <Continuous>  lisinopril 5 milliGRAM(s) Oral daily  lopinavir 200 mG/ritonavir 50 mG 1 Tablet(s) Oral every 12 hours  melatonin 3 milliGRAM(s) Oral at bedtime  memantine 10 milliGRAM(s) Oral two times a day  nicotine -   7 mG/24Hr(s) Patch 1 patch Transdermal daily  regadenoson Injectable 0.4 milliGRAM(s) IV Push once    MEDICATIONS  (PRN):  heparin   Injectable 3200 Unit(s) IV Push every 6 hours PRN For aPTT less than 40      Allergies  No Known Allergies      Vital Signs Last 24 Hrs  T(C): 36.6 (09 Nov 2020 16:45), Max: 36.8 (08 Nov 2020 23:47)  T(F): 97.9 (09 Nov 2020 16:45), Max: 98.3 (08 Nov 2020 23:47)  HR: 95 (09 Nov 2020 16:45) (60 - 95)  BP: 100/71 (09 Nov 2020 16:45) (100/71 - 139/74)  BP(mean): 81 (09 Nov 2020 16:45) (81 - 104)  RR: 17 (09 Nov 2020 16:45) (17 - 19)  SpO2: 96% (09 Nov 2020 16:45) (95% - 99%)      PHYSICAL EXAM:  GENERAL: NAD  HEAD:  Atraumatic   EYES: PERRLA  NERVOUS SYSTEM:  Awake, confused  CHEST/LUNG: Clear  HEART: RRR  ABDOMEN: Soft, non tender  EXTREMITIES:  no edema      LABS:                        10.7   4.17  )-----------( 197      ( 09 Nov 2020 06:36 )             34.4     11-09    139  |  106  |  15  ----------------------------<  85  3.9   |  32<H>  |  1.00    Ca    8.8      09 Nov 2020 10:12      PTT - ( 09 Nov 2020 14:22 )  PTT:77.2 sec    CAPILLARY BLOOD GLUCOSE          Culture - Urine (collected 06 Nov 2020 09:56)  Source: .Urine Clean Catch (Midstream)  Final Report (08 Nov 2020 07:41):    >=3 organisms. Probable collection contamination.      RADIOLOGY & ADDITIONAL TESTS:    Imaging Personally Reviewed:  [ ] YES  [ ] NO    Consultant(s) Notes Reviewed:  [ ] YES  [ ] NO    Care Discussed with Consultants/Other Providers [ ] YES  [ ] NO

## 2020-11-09 NOTE — PROGRESS NOTE ADULT - ASSESSMENT
Subjective Complaints:  Historian:             Vital Signs Last 24 Hrs  T(C): 36.6 (09 Nov 2020 16:45), Max: 36.8 (08 Nov 2020 23:47)  T(F): 97.9 (09 Nov 2020 16:45), Max: 98.3 (08 Nov 2020 23:47)  HR: 95 (09 Nov 2020 16:45) (61 - 95)  BP: 100/71 (09 Nov 2020 16:45) (100/71 - 139/74)  BP(mean): 81 (09 Nov 2020 16:45) (81 - 104)  RR: 17 (09 Nov 2020 16:45) (17 - 19)  SpO2: 96% (09 Nov 2020 16:45) (95% - 99%)    GENERAL PHYSICAL EXAM:  General:  Appears stated age, well-groomed, well-nourished, no distress  HEENT:  NC/AT, patent nares w/ pink mucosa, OP clear w/o lesions, PERRL, EOMI, conjunctivae clear, no thyromegaly, nodules, adenopathy, no JVD  Chest:  Full & symmetric excursion, no increased effort, breath sounds clear  Cardiovascular:  Regular rhythm, S1, S2, no murmur/rub/S3/S4, no carotid/femoral/abdominal bruit, radial/pedal pulses 2+, no edema  Abdomen:  Soft, non-tender, non-distended, normoactive bowel sounds, no HSM  Extremities:  Gait & station:   Digits:   Nails:   Joints, Bones, Muscles:   ROM:   Stability:  Skin:  No rash/erythema/ecchymoses/petechiae/wounds/abscess/warm/dry  Musculoskeletal:  Full ROM in all joints w/o swelling/tenderness/effusion        LABS:                        10.7   4.17  )-----------( 197      ( 09 Nov 2020 06:36 )             34.4     11-09    139  |  106  |  15  ----------------------------<  85  3.9   |  32<H>  |  1.00    Ca    8.8      09 Nov 2020 10:12      PTT - ( 09 Nov 2020 14:22 )  PTT:77.2 sec      RADIOLOGY & ADDITIONAL STUDIES:        Neurology Progress Note:      Mental Status: awaek alert speech fluent follows commands       Cranial Nerves: 2 1.2 intact      Motor:   arm leg 4/5         Sensory: intact      Cerebellar: defrd      Gait:unsteady        Assesment/Plan:  confused ct head noted bryanna occipital infarction  dementia for mri malik carotid no stenosis on asa

## 2020-11-10 LAB
ALBUMIN SERPL ELPH-MCNC: 3.2 G/DL — LOW (ref 3.3–5)
ALP SERPL-CCNC: 60 U/L — SIGNIFICANT CHANGE UP (ref 40–120)
ALT FLD-CCNC: 19 U/L — SIGNIFICANT CHANGE UP (ref 12–78)
ANION GAP SERPL CALC-SCNC: 5 MMOL/L — SIGNIFICANT CHANGE UP (ref 5–17)
APTT BLD: 35.8 SEC — HIGH (ref 27.5–35.5)
APTT BLD: 53.7 SEC — HIGH (ref 27.5–35.5)
AST SERPL-CCNC: 21 U/L — SIGNIFICANT CHANGE UP (ref 15–37)
BILIRUB SERPL-MCNC: 1.2 MG/DL — SIGNIFICANT CHANGE UP (ref 0.2–1.2)
BUN SERPL-MCNC: 14 MG/DL — SIGNIFICANT CHANGE UP (ref 7–23)
CALCIUM SERPL-MCNC: 8.8 MG/DL — SIGNIFICANT CHANGE UP (ref 8.5–10.1)
CHLORIDE SERPL-SCNC: 106 MMOL/L — SIGNIFICANT CHANGE UP (ref 96–108)
CO2 SERPL-SCNC: 28 MMOL/L — SIGNIFICANT CHANGE UP (ref 22–31)
CREAT SERPL-MCNC: 0.89 MG/DL — SIGNIFICANT CHANGE UP (ref 0.5–1.3)
GLUCOSE SERPL-MCNC: 96 MG/DL — SIGNIFICANT CHANGE UP (ref 70–99)
HCT VFR BLD CALC: 33.6 % — LOW (ref 34.5–45)
HGB BLD-MCNC: 10.4 G/DL — LOW (ref 11.5–15.5)
HIV-1 VIRAL LOAD RESULT: SIGNIFICANT CHANGE UP
HIV1 RNA # SERPL NAA+PROBE: SIGNIFICANT CHANGE UP
HIV1 RNA SER-IMP: SIGNIFICANT CHANGE UP
HIV1 RNA SERPL NAA+PROBE-ACNC: SIGNIFICANT CHANGE UP
HIV1 RNA SERPL NAA+PROBE-LOG#: SIGNIFICANT CHANGE UP LG COP/ML
MCHC RBC-ENTMCNC: 24.1 PG — LOW (ref 27–34)
MCHC RBC-ENTMCNC: 31 GM/DL — LOW (ref 32–36)
MCV RBC AUTO: 77.8 FL — LOW (ref 80–100)
NRBC # BLD: 0 /100 WBCS — SIGNIFICANT CHANGE UP (ref 0–0)
PLATELET # BLD AUTO: 191 K/UL — SIGNIFICANT CHANGE UP (ref 150–400)
POTASSIUM SERPL-MCNC: 3.6 MMOL/L — SIGNIFICANT CHANGE UP (ref 3.5–5.3)
POTASSIUM SERPL-SCNC: 3.6 MMOL/L — SIGNIFICANT CHANGE UP (ref 3.5–5.3)
PROT SERPL-MCNC: 6.3 GM/DL — SIGNIFICANT CHANGE UP (ref 6–8.3)
RBC # BLD: 4.32 M/UL — SIGNIFICANT CHANGE UP (ref 3.8–5.2)
RBC # FLD: 13.8 % — SIGNIFICANT CHANGE UP (ref 10.3–14.5)
SODIUM SERPL-SCNC: 139 MMOL/L — SIGNIFICANT CHANGE UP (ref 135–145)
TROPONIN I SERPL-MCNC: 1.99 NG/ML — HIGH (ref 0.01–0.04)
WBC # BLD: 4.27 K/UL — SIGNIFICANT CHANGE UP (ref 3.8–10.5)
WBC # FLD AUTO: 4.27 K/UL — SIGNIFICANT CHANGE UP (ref 3.8–10.5)

## 2020-11-10 PROCEDURE — 99233 SBSQ HOSP IP/OBS HIGH 50: CPT

## 2020-11-10 PROCEDURE — 70498 CT ANGIOGRAPHY NECK: CPT | Mod: 26

## 2020-11-10 PROCEDURE — 70551 MRI BRAIN STEM W/O DYE: CPT | Mod: 26

## 2020-11-10 PROCEDURE — 70496 CT ANGIOGRAPHY HEAD: CPT | Mod: 26

## 2020-11-10 RX ORDER — CLOPIDOGREL BISULFATE 75 MG/1
75 TABLET, FILM COATED ORAL DAILY
Refills: 0 | Status: DISCONTINUED | OUTPATIENT
Start: 2020-11-10 | End: 2020-11-12

## 2020-11-10 RX ORDER — HEPARIN SODIUM 5000 [USP'U]/ML
5000 INJECTION INTRAVENOUS; SUBCUTANEOUS EVERY 12 HOURS
Refills: 0 | Status: DISCONTINUED | OUTPATIENT
Start: 2020-11-10 | End: 2020-12-01

## 2020-11-10 RX ADMIN — Medication 3 MILLIGRAM(S): at 21:20

## 2020-11-10 RX ADMIN — Medication 40 MILLIGRAM(S): at 05:36

## 2020-11-10 RX ADMIN — CARVEDILOL PHOSPHATE 3.12 MILLIGRAM(S): 80 CAPSULE, EXTENDED RELEASE ORAL at 17:36

## 2020-11-10 RX ADMIN — CARVEDILOL PHOSPHATE 3.12 MILLIGRAM(S): 80 CAPSULE, EXTENDED RELEASE ORAL at 05:36

## 2020-11-10 RX ADMIN — MEMANTINE HYDROCHLORIDE 10 MILLIGRAM(S): 10 TABLET ORAL at 17:36

## 2020-11-10 RX ADMIN — ATORVASTATIN CALCIUM 40 MILLIGRAM(S): 80 TABLET, FILM COATED ORAL at 21:20

## 2020-11-10 RX ADMIN — HEPARIN SODIUM 650 UNIT(S)/HR: 5000 INJECTION INTRAVENOUS; SUBCUTANEOUS at 02:34

## 2020-11-10 RX ADMIN — Medication 1 PATCH: at 11:44

## 2020-11-10 RX ADMIN — Medication 1 TABLET(S): at 17:36

## 2020-11-10 RX ADMIN — LISINOPRIL 5 MILLIGRAM(S): 2.5 TABLET ORAL at 05:36

## 2020-11-10 RX ADMIN — HEPARIN SODIUM 5000 UNIT(S): 5000 INJECTION INTRAVENOUS; SUBCUTANEOUS at 20:15

## 2020-11-10 RX ADMIN — Medication 81 MILLIGRAM(S): at 11:45

## 2020-11-10 RX ADMIN — Medication 1 PATCH: at 19:41

## 2020-11-10 RX ADMIN — CLOPIDOGREL BISULFATE 75 MILLIGRAM(S): 75 TABLET, FILM COATED ORAL at 11:46

## 2020-11-10 RX ADMIN — MEMANTINE HYDROCHLORIDE 10 MILLIGRAM(S): 10 TABLET ORAL at 05:35

## 2020-11-10 RX ADMIN — Medication 1 PATCH: at 11:45

## 2020-11-10 RX ADMIN — Medication 1 TABLET(S): at 05:35

## 2020-11-10 RX ADMIN — Medication 1 PATCH: at 08:24

## 2020-11-10 RX ADMIN — DONEPEZIL HYDROCHLORIDE 5 MILLIGRAM(S): 10 TABLET, FILM COATED ORAL at 21:20

## 2020-11-10 NOTE — PROGRESS NOTE ADULT - SUBJECTIVE AND OBJECTIVE BOX
Patient is a 80y old  Female who presents with a chief complaint of Altered Mental Status (10 Nov 2020 16:01)      PAST MEDICAL & SURGICAL HISTORY:      INTERVAL HISTORY: Patient in bed, pleasantly confused. Denies chest pain, dyspnea or palpitations.  	  MEDICATIONS:  MEDICATIONS  (STANDING):  aspirin  chewable 81 milliGRAM(s) Oral daily  atorvastatin 40 milliGRAM(s) Oral at bedtime  carvedilol 3.125 milliGRAM(s) Oral every 12 hours  clopidogrel Tablet 75 milliGRAM(s) Oral daily  donepezil 5 milliGRAM(s) Oral at bedtime  furosemide    Tablet 40 milliGRAM(s) Oral daily  lisinopril 5 milliGRAM(s) Oral daily  lopinavir 200 mG/ritonavir 50 mG 1 Tablet(s) Oral every 12 hours  melatonin 3 milliGRAM(s) Oral at bedtime  memantine 10 milliGRAM(s) Oral two times a day  nicotine -   7 mG/24Hr(s) Patch 1 patch Transdermal daily  regadenoson Injectable 0.4 milliGRAM(s) IV Push once    MEDICATIONS  (PRN):      Vitals:  T(F): 97.6 (11-10-20 @ 15:34), Max: 98.5 (11-09-20 @ 23:42)  HR: 59 (11-10-20 @ 15:34) (59 - 73)  BP: 132/85 (11-10-20 @ 15:34) (120/68 - 136/80)  RR: 18 (11-10-20 @ 15:34) (18 - 18)  SpO2: 90% (11-10-20 @ 15:34) (90% - 97%)  Wt(kg): --53.4kg    11-09 @ 07:01  -  11-10 @ 07:00  --------------------------------------------------------  IN:    Heparin Infusion: 84 mL  Total IN: 84 mL    OUT:  Total OUT: 0 mL    Total NET: 84 mL    PHYSICAL EXAM:  Neuro: Awake, responsive  CV: S1 S2 RRR  Lungs: CTABL  GI: Soft, BS +, ND, NT  Extremities: No edema    TELEMETRY:  SR  	    ECG:  < from: 12 Lead ECG (11.07.20 @ 12:20) >  Ventricular Rate 53 BPM    Atrial Rate 53 BPM    P-R Interval 172 ms    QRS Duration 180 ms    Q-T Interval 516 ms    QTC Calculation(Bazett) 484 ms    P Axis 53 degrees    R Axis -61 degrees    T Axis 89 degrees    Diagnosis Line Sinus bradycardia  Possible Left atrial enlargement  Left axis deviation  Left bundle branch block  Abnormal ECG  When compared with ECG of 06-NOV-2020 10:06,  T wave amplitude has decreased in Anterior leads    < end of copied text >  	    RADIOLOGY:   < from: CT Angio Head w/ IV Cont (11.10.20 @ 14:35) >  IMPRESSION:    Brain CT without contrast:  Redemonstration of acute/subacute infarcts in the bilateral occipital lobes and right cerebellum. An acute right frontal infarct is better seen on MRI. No evidence of hemorrhagic transformation.    CT angiography neck:  1.  No hemodynamically significant stenosis of the bilateral cervical ICAs using NASCET criteria.  Patent vertebral arteries.  No evidence of vascular dissection.  2.  Enlarged thyroid gland containing bilateral nodules and calcifications. Consider nonemergent thyroid ultrasound.    CT angiography brain: No large vessel occlusion. No evidence of aneurysm.      < end of copied text >    DIAGNOSTIC TESTING:    [x ] Echocardiogram:  < from: TTE Echo Complete w/o Contrast w/ Doppler (11.05.20 @ 17:09) >  Summary:   1. Left ventricular ejection fraction, by visual estimation, is <20%.   2. Technically good study.   3. Severely decreased global left ventricular systolic function.   4. Basal and mid inferior wall and basal and mid inferolateral wall are abnormal as described above.   5. Dilated cardiomyopathy.   6. Moderately increased left ventricular internal cavity size.   7. Spectral Doppler shows impaired relaxation pattern of left ventricular myocardial filling (Grade I diastolic dysfunction).   8. Mildly reduced RV systolic function.   9. Mildly enlarged left atrium.  10. Normal right atrial size.  11. There is no evidence of pericardial effusion.  12. Mild mitral valve regurgitation.  13. Mild The mitral valve leaflets are tethered which is due to reduced systolic function and elevated LVDP.  14. Mild tricuspid regurgitation.  15. Sclerotic aortic valve with normal opening.  16.Estimated pulmonary artery systolic pressure is 38.1 mmHg assuming a right atrial pressure of 10 mmHg, which is consistent with borderline pulmonary hypertension.  17. There is mild aortic root calcification.    < end of copied text >         LABS:	 	    CARDIAC MARKERS:  Troponin I, Serum: 1.990 ng/mL (11-10 @ 10:28)  Troponin I, Serum: 9.430 ng/mL (11-08 @ 03:57)  Troponin I, Serum: 9.300 ng/mL (11-07 @ 20:32)          10 Nov 2020 07:26    139    |  106    |  14     ----------------------------<  96     3.6     |  28     |  0.89   09 Nov 2020 10:12    139    |  106    |  15     ----------------------------<  85     3.9     |  32     |  1.00   08 Nov 2020 03:57    140    |  109    |  15     ----------------------------<  85     3.4     |  26     |  0.91     Ca    8.8        10 Nov 2020 07:26    TPro  6.3    /  Alb  3.2    /  TBili  1.2    /  DBili  x      /  AST  21     /  ALT  19     /  AlkPhos  60     10 Nov 2020 07:26                          10.4   4.27  )-----------( 191      ( 10 Nov 2020 07:26 )             33.6 ,                       10.7   4.17  )-----------( 197      ( 09 Nov 2020 06:36 )             34.4 ,                       10.4   4.99  )-----------( 170      ( 08 Nov 2020 03:57 )             34.1   proBNP:   Lipid Profile: 11-07 @ 12:37  HDL/Total Cholesterol: --  HDL Chol:              58 mg/dL  Serum Chol:            127 mg/dL  Direct LDL:            --  Triglycerides:         50 mg/dL    PT/PTT- ( 10 Nov 2020 08:13 )   PT: x    ;  PTT: 35.8 sec       PT/PTT- ( 10 Nov 2020 01:41 )   PT: x    ;  PTT: 53.7 sec

## 2020-11-10 NOTE — PROGRESS NOTE ADULT - SUBJECTIVE AND OBJECTIVE BOX
Patient is a 80y old  Female who presents with a chief complaint of Altered Mental Status (09 Nov 2020 21:39)      PAST MEDICAL & SURGICAL HISTORY:      INTERVAL HISTORY:  	  MEDICATIONS:  MEDICATIONS  (STANDING):  aspirin  chewable 81 milliGRAM(s) Oral daily  atorvastatin 40 milliGRAM(s) Oral at bedtime  carvedilol 3.125 milliGRAM(s) Oral every 12 hours  clopidogrel Tablet 75 milliGRAM(s) Oral daily  donepezil 5 milliGRAM(s) Oral at bedtime  furosemide    Tablet 40 milliGRAM(s) Oral daily  lisinopril 5 milliGRAM(s) Oral daily  lopinavir 200 mG/ritonavir 50 mG 1 Tablet(s) Oral every 12 hours  melatonin 3 milliGRAM(s) Oral at bedtime  memantine 10 milliGRAM(s) Oral two times a day  nicotine -   7 mG/24Hr(s) Patch 1 patch Transdermal daily  regadenoson Injectable 0.4 milliGRAM(s) IV Push once    MEDICATIONS  (PRN):      Vitals:  T(F): 97.6 (11-10-20 @ 15:34), Max: 98.5 (11-09-20 @ 23:42)  HR: 59 (11-10-20 @ 15:34) (59 - 95)  BP: 132/85 (11-10-20 @ 15:34) (100/71 - 136/80)  RR: 18 (11-10-20 @ 15:34) (17 - 18)  SpO2: 90% (11-10-20 @ 15:34) (90% - 97%)  Wt(kg): --    11-09 @ 07:01  -  11-10 @ 07:00  --------------------------------------------------------  IN:    Heparin Infusion: 84 mL  Total IN: 84 mL    OUT:  Total OUT: 0 mL    Total NET: 84 mL              PHYSICAL EXAM:  Neuro: Awake, responsive  CV: S1 S2 RRR  Lungs: CTABL  GI: Soft, BS +, ND, NT  Extremities: No edema    TELEMETRY:   	    ECG:  	    RADIOLOGY:     DIAGNOSTIC TESTING:    [ ] Echocardiogram:  [ ]  Catheterization:  [ ] Stress Test:    OTHER: 	2    LABS:	 	    CARDIAC MARKERS:  Troponin I, Serum: 1.990 ng/mL (11-10 @ 10:28)  Troponin I, Serum: 9.430 ng/mL (11-08 @ 03:57)  Troponin I, Serum: 9.300 ng/mL (11-07 @ 20:32)          10 Nov 2020 07:26    139    |  106    |  14     ----------------------------<  96     3.6     |  28     |  0.89   09 Nov 2020 10:12    139    |  106    |  15     ----------------------------<  85     3.9     |  32     |  1.00   08 Nov 2020 03:57    140    |  109    |  15     ----------------------------<  85     3.4     |  26     |  0.91     Ca    8.8        10 Nov 2020 07:26    TPro  6.3    /  Alb  3.2    /  TBili  1.2    /  DBili  x      /  AST  21     /  ALT  19     /  AlkPhos  60     10 Nov 2020 07:26                          10.4   4.27  )-----------( 191      ( 10 Nov 2020 07:26 )             33.6 ,                       10.7   4.17  )-----------( 197      ( 09 Nov 2020 06:36 )             34.4 ,                       10.4   4.99  )-----------( 170      ( 08 Nov 2020 03:57 )             34.1   proBNP:   Lipid Profile: 11-07 @ 12:37  HDL/Total Cholesterol: --  HDL Chol:              58 mg/dL  Serum Chol:            127 mg/dL  Direct LDL:            --  Triglycerides:         50 mg/dL    HgA1c:   TSH:     PT/PTT- ( 10 Nov 2020 08:13 )   PT: x    ;  PTT: 35.8 sec       PT/PTT- ( 10 Nov 2020 01:41 )   PT: x    ;  PTT: 53.7 sec

## 2020-11-10 NOTE — PROGRESS NOTE ADULT - ASSESSMENT
80 years old female with history of dementia and Hypertension and diabetes was found with wandering with cerebrovascular accident seen and lbbb with elevated trop     Acute Systolic Heart Failure/ NSTEMI:  - EF < 20 %  - S/P heparin gtt   - Continue ASA, will stop plavix   - Continue statin   - Cardiology eval appreciated  - Stress test cancelled for acute CVA    Anemia:  - Microcytic anemia  - Neg occult blood  - Hb stable  - Iron studies pending    Acute/subacute CVA:  - MR brain noted  - CTA head and neck unremarkable  - Neurology eval appreciated   - On ASA, Statin , Plavix stopped    HIV:  - Meds resumed  - HIV neg here   - Viral load undetectable  - Will get more history from family    Positive RPR:  - Will follow up    Essential hypertension:  - Continue home meds.    DVT ppx

## 2020-11-10 NOTE — PROGRESS NOTE ADULT - SUBJECTIVE AND OBJECTIVE BOX
Patient is a 80y old  Female who presents with a chief complaint of Altered Mental Status (10 Nov 2020 16:55)    INTERVAL HPI/OVERNIGHT EVENTS:  Pt was seen and examined, no acute events.    MEDICATIONS  (STANDING):  aspirin  chewable 81 milliGRAM(s) Oral daily  atorvastatin 40 milliGRAM(s) Oral at bedtime  carvedilol 3.125 milliGRAM(s) Oral every 12 hours  clopidogrel Tablet 75 milliGRAM(s) Oral daily  donepezil 5 milliGRAM(s) Oral at bedtime  furosemide    Tablet 40 milliGRAM(s) Oral daily  lisinopril 5 milliGRAM(s) Oral daily  lopinavir 200 mG/ritonavir 50 mG 1 Tablet(s) Oral every 12 hours  melatonin 3 milliGRAM(s) Oral at bedtime  memantine 10 milliGRAM(s) Oral two times a day  nicotine -   7 mG/24Hr(s) Patch 1 patch Transdermal daily  regadenoson Injectable 0.4 milliGRAM(s) IV Push once    MEDICATIONS  (PRN):      Allergies  No Known Allergies        Vital Signs Last 24 Hrs  T(C): 36.4 (10 Nov 2020 15:34), Max: 36.9 (09 Nov 2020 23:42)  T(F): 97.6 (10 Nov 2020 15:34), Max: 98.5 (09 Nov 2020 23:42)  HR: 59 (10 Nov 2020 15:34) (59 - 73)  BP: 132/85 (10 Nov 2020 15:34) (120/68 - 136/80)  BP(mean): --  RR: 18 (10 Nov 2020 15:34) (18 - 18)  SpO2: 90% (10 Nov 2020 15:34) (90% - 97%)      PHYSICAL EXAM:  GENERAL: NAD  HEAD:  Atraumatic  EYES: PERRLA  NERVOUS SYSTEM:  Awake, confused  CHEST/LUNG: Clear  HEART: RRR  ABDOMEN: Soft, non tender  EXTREMITIES:  no edema      LABS:                        10.4   4.27  )-----------( 191      ( 10 Nov 2020 07:26 )             33.6     11-10    139  |  106  |  14  ----------------------------<  96  3.6   |  28  |  0.89    Ca    8.8      10 Nov 2020 07:26    TPro  6.3  /  Alb  3.2<L>  /  TBili  1.2  /  DBili  x   /  AST  21  /  ALT  19  /  AlkPhos  60  11-10    PTT - ( 10 Nov 2020 08:13 )  PTT:35.8 sec    CAPILLARY BLOOD GLUCOSE          Culture - Urine (collected 06 Nov 2020 09:56)  Source: .Urine Clean Catch (Midstream)  Final Report (08 Nov 2020 07:41):    >=3 organisms. Probable collection contamination.      RADIOLOGY & ADDITIONAL TESTS:    Imaging Personally Reviewed:  [ ] YES  [ ] NO    Consultant(s) Notes Reviewed:  [ ] YES  [ ] NO    Care Discussed with Consultants/Other Providers [ ] YES  [ ] NO

## 2020-11-10 NOTE — PROGRESS NOTE ADULT - ASSESSMENT
Subjective Complaints:  Historian:             Vital Signs Last 24 Hrs  T(C): 36.4 (10 Nov 2020 15:34), Max: 36.9 (09 Nov 2020 23:42)  T(F): 97.6 (10 Nov 2020 15:34), Max: 98.5 (09 Nov 2020 23:42)  HR: 59 (10 Nov 2020 15:34) (59 - 64)  BP: 132/85 (10 Nov 2020 15:34) (120/68 - 136/80)  BP(mean): --  RR: 18 (10 Nov 2020 15:34) (18 - 18)  SpO2: 90% (10 Nov 2020 15:34) (90% - 97%)    GENERAL PHYSICAL EXAM:  General:  Appears stated age, well-groomed, well-nourished, no distress  HEENT:  NC/AT, patent nares w/ pink mucosa, OP clear w/o lesions, PERRL, EOMI, conjunctivae clear, no thyromegaly, nodules, adenopathy, no JVD  Chest:  Full & symmetric excursion, no increased effort, breath sounds clear  Cardiovascular:  Regular rhythm, S1, S2, no murmur/rub/S3/S4, no carotid/femoral/abdominal bruit, radial/pedal pulses 2+, no edema  Abdomen:  Soft, non-tender, non-distended, normoactive bowel sounds, no HSM  Extremities:  Gait & station:   Digits:   Nails:   Joints, Bones, Muscles:   ROM:   Stability:  Skin:  No rash/erythema/ecchymoses/petechiae/wounds/abscess/warm/dry  Musculoskeletal:  Full ROM in all joints w/o swelling/tenderness/effusion        LABS:                        10.4   4.27  )-----------( 191      ( 10 Nov 2020 07:26 )             33.6     11-10    139  |  106  |  14  ----------------------------<  96  3.6   |  28  |  0.89    Ca    8.8      10 Nov 2020 07:26    TPro  6.3  /  Alb  3.2<L>  /  TBili  1.2  /  DBili  x   /  AST  21  /  ALT  19  /  AlkPhos  60  11-10    PTT - ( 10 Nov 2020 08:13 )  PTT:35.8 sec      RADIOLOGY & ADDITIONAL STUDIES:        Neurology Progress Note:      Mental Status: awaek alert speech fluent  folows commands       Cranial Nerves: 2 1/2 intact      Motor:   arm leg 4/5         Sensory: intact      Cerebellar: defrd      Gait:  unsteady       Assesment/Plan:  mri malik noted multiple infarctions r/o embol.ic  on asa add plavix for cintia cta brain   noted no occlusion brain and neck rpr positive id eval for pt will follow

## 2020-11-10 NOTE — PROGRESS NOTE ADULT - ASSESSMENT
ASSESSMENT:  80-year-old female with unclear medical history found along highway confused. Seen to have left bundle branch block of unclear duration and echo reveals severe global dilated cardiomyopathy, EF <20%, GR I DD. Also non-STEMI noted with peak troponin of approximately 27. Seen ambulating without chest complaints or shortness of breath. Noted to be a smoker currently on nicotine replacement patch. On 11/10/20 MRI Brain report acute/subacute infarct. Heparin infusion stopped and [facility] Stress test cancelled.    PLAN:     -Neurology for CVA  -continue Telemetry  -[facility] Stress test  for better cardiac risk assessment cancelled for now. Can be completed as outpatient.  -Continue  aspirin and Plavix for dual antiplatelet therapy and presumed CAD management if cleared by neurology.   -Continue  Lisinopril and Coreg for dilated cardiomyopathy treatment.   -Stop heparin infusion for NSTEMI care.  - continue Lasix and Statin.   -Smoking cessation was encouraged .   -Background therapy with HAART per medicine.     -Goals of care to be determined/ no aggressive cardiac interventions at this time.  Optimize Medical management.

## 2020-11-11 LAB
ALBUMIN SERPL ELPH-MCNC: 3.3 G/DL — SIGNIFICANT CHANGE UP (ref 3.3–5)
ALP SERPL-CCNC: 59 U/L — SIGNIFICANT CHANGE UP (ref 40–120)
ALT FLD-CCNC: 20 U/L — SIGNIFICANT CHANGE UP (ref 12–78)
ANION GAP SERPL CALC-SCNC: 5 MMOL/L — SIGNIFICANT CHANGE UP (ref 5–17)
AST SERPL-CCNC: 20 U/L — SIGNIFICANT CHANGE UP (ref 15–37)
BILIRUB SERPL-MCNC: 1.1 MG/DL — SIGNIFICANT CHANGE UP (ref 0.2–1.2)
BUN SERPL-MCNC: 12 MG/DL — SIGNIFICANT CHANGE UP (ref 7–23)
CALCIUM SERPL-MCNC: 9.2 MG/DL — SIGNIFICANT CHANGE UP (ref 8.5–10.1)
CHLORIDE SERPL-SCNC: 103 MMOL/L — SIGNIFICANT CHANGE UP (ref 96–108)
CO2 SERPL-SCNC: 33 MMOL/L — HIGH (ref 22–31)
CREAT SERPL-MCNC: 0.87 MG/DL — SIGNIFICANT CHANGE UP (ref 0.5–1.3)
ERYTHROCYTE [SEDIMENTATION RATE] IN BLOOD: 9 MM/HR — SIGNIFICANT CHANGE UP (ref 0–20)
FERRITIN SERPL-MCNC: 275 NG/ML — HIGH (ref 15–150)
GLUCOSE SERPL-MCNC: 94 MG/DL — SIGNIFICANT CHANGE UP (ref 70–99)
HCT VFR BLD CALC: 33.5 % — LOW (ref 34.5–45)
HGB BLD-MCNC: 10.5 G/DL — LOW (ref 11.5–15.5)
HIV 1+2 AB+HIV1 P24 AG SERPL QL IA: SIGNIFICANT CHANGE UP
IRON SATN MFR SERPL: 103 UG/DL — SIGNIFICANT CHANGE UP (ref 30–160)
IRON SATN MFR SERPL: 40 % — SIGNIFICANT CHANGE UP (ref 14–50)
MAGNESIUM SERPL-MCNC: 2.1 MG/DL — SIGNIFICANT CHANGE UP (ref 1.6–2.6)
MCHC RBC-ENTMCNC: 24.1 PG — LOW (ref 27–34)
MCHC RBC-ENTMCNC: 31.3 GM/DL — LOW (ref 32–36)
MCV RBC AUTO: 76.8 FL — LOW (ref 80–100)
NRBC # BLD: 0 /100 WBCS — SIGNIFICANT CHANGE UP (ref 0–0)
PLATELET # BLD AUTO: 205 K/UL — SIGNIFICANT CHANGE UP (ref 150–400)
POTASSIUM SERPL-MCNC: 3.9 MMOL/L — SIGNIFICANT CHANGE UP (ref 3.5–5.3)
POTASSIUM SERPL-SCNC: 3.9 MMOL/L — SIGNIFICANT CHANGE UP (ref 3.5–5.3)
PROT SERPL-MCNC: 6.4 GM/DL — SIGNIFICANT CHANGE UP (ref 6–8.3)
RBC # BLD: 4.36 M/UL — SIGNIFICANT CHANGE UP (ref 3.8–5.2)
RBC # FLD: 13.7 % — SIGNIFICANT CHANGE UP (ref 10.3–14.5)
SODIUM SERPL-SCNC: 141 MMOL/L — SIGNIFICANT CHANGE UP (ref 135–145)
TIBC SERPL-MCNC: 256 UG/DL — SIGNIFICANT CHANGE UP (ref 220–430)
UIBC SERPL-MCNC: 154 UG/DL — SIGNIFICANT CHANGE UP (ref 110–370)
WBC # BLD: 4.44 K/UL — SIGNIFICANT CHANGE UP (ref 3.8–10.5)
WBC # FLD AUTO: 4.44 K/UL — SIGNIFICANT CHANGE UP (ref 3.8–10.5)

## 2020-11-11 PROCEDURE — 99233 SBSQ HOSP IP/OBS HIGH 50: CPT

## 2020-11-11 RX ORDER — PENICILLIN G BENZATHINE 1200000 [IU]/2ML
2.4 INJECTION, SUSPENSION INTRAMUSCULAR ONCE
Refills: 0 | Status: COMPLETED | OUTPATIENT
Start: 2020-11-11 | End: 2020-11-11

## 2020-11-11 RX ADMIN — Medication 1 PATCH: at 11:55

## 2020-11-11 RX ADMIN — Medication 3 MILLIGRAM(S): at 21:36

## 2020-11-11 RX ADMIN — Medication 1 PATCH: at 19:37

## 2020-11-11 RX ADMIN — CLOPIDOGREL BISULFATE 75 MILLIGRAM(S): 75 TABLET, FILM COATED ORAL at 11:55

## 2020-11-11 RX ADMIN — Medication 40 MILLIGRAM(S): at 06:17

## 2020-11-11 RX ADMIN — MEMANTINE HYDROCHLORIDE 10 MILLIGRAM(S): 10 TABLET ORAL at 17:17

## 2020-11-11 RX ADMIN — LISINOPRIL 5 MILLIGRAM(S): 2.5 TABLET ORAL at 06:17

## 2020-11-11 RX ADMIN — Medication 1 TABLET(S): at 06:16

## 2020-11-11 RX ADMIN — DONEPEZIL HYDROCHLORIDE 5 MILLIGRAM(S): 10 TABLET, FILM COATED ORAL at 21:36

## 2020-11-11 RX ADMIN — Medication 1 PATCH: at 08:14

## 2020-11-11 RX ADMIN — MEMANTINE HYDROCHLORIDE 10 MILLIGRAM(S): 10 TABLET ORAL at 06:17

## 2020-11-11 RX ADMIN — CARVEDILOL PHOSPHATE 3.12 MILLIGRAM(S): 80 CAPSULE, EXTENDED RELEASE ORAL at 17:17

## 2020-11-11 RX ADMIN — Medication 1 PATCH: at 17:13

## 2020-11-11 RX ADMIN — PENICILLIN G BENZATHINE 2.4 MILLION UNIT(S): 1200000 INJECTION, SUSPENSION INTRAMUSCULAR at 14:17

## 2020-11-11 RX ADMIN — ATORVASTATIN CALCIUM 40 MILLIGRAM(S): 80 TABLET, FILM COATED ORAL at 21:36

## 2020-11-11 RX ADMIN — HEPARIN SODIUM 5000 UNIT(S): 5000 INJECTION INTRAVENOUS; SUBCUTANEOUS at 06:19

## 2020-11-11 RX ADMIN — Medication 81 MILLIGRAM(S): at 11:55

## 2020-11-11 RX ADMIN — HEPARIN SODIUM 5000 UNIT(S): 5000 INJECTION INTRAVENOUS; SUBCUTANEOUS at 17:17

## 2020-11-11 NOTE — PROGRESS NOTE ADULT - SUBJECTIVE AND OBJECTIVE BOX
Patient is a 80y old  Female who presents with a chief complaint of Altered Mental Status (11 Nov 2020 11:27)      PAST MEDICAL & SURGICAL HISTORY:      INTERVAL HISTORY: Patient in bed, denies chest pain and dyspnea.  	  MEDICATIONS:  MEDICATIONS  (STANDING):  aspirin  chewable 81 milliGRAM(s) Oral daily  atorvastatin 40 milliGRAM(s) Oral at bedtime  carvedilol 3.125 milliGRAM(s) Oral every 12 hours  clopidogrel Tablet 75 milliGRAM(s) Oral daily  donepezil 5 milliGRAM(s) Oral at bedtime  furosemide    Tablet 40 milliGRAM(s) Oral daily  heparin   Injectable 5000 Unit(s) SubCutaneous every 12 hours  lisinopril 5 milliGRAM(s) Oral daily  lopinavir 200 mG/ritonavir 50 mG 1 Tablet(s) Oral every 12 hours  melatonin 3 milliGRAM(s) Oral at bedtime  memantine 10 milliGRAM(s) Oral two times a day  nicotine -   7 mG/24Hr(s) Patch 1 patch Transdermal daily  regadenoson Injectable 0.4 milliGRAM(s) IV Push once    MEDICATIONS  (PRN):      Vitals:  T(F): 98.6 (11-11-20 @ 11:22), Max: 98.6 (11-11-20 @ 11:22)  HR: 70 (11-11-20 @ 11:22) (36 - 70)  BP: 122/76 (11-11-20 @ 11:22) (110/60 - 135/72)  RR: 18 (11-11-20 @ 11:22) (16 - 18)  SpO2: 96% (11-11-20 @ 11:22) (90% - 98%)  Wt(kg): --13.8 kg       PHYSICAL EXAM:  Neuro: Awake, responsive  CV: S1 S2 RRR  Lungs: CTABL  GI: Soft, BS +, ND, NT  Extremities: No edema    TELEMETRY: SR/franck  	     RADIOLOGY: < from: Xray Chest 1 View- PORTABLE-Urgent (Xray Chest 1 View- PORTABLE-Urgent .) (11.05.20 @ 14:58) >  AP chest. No priors.    Heart enlarged despite AP projection. Calcified aortic arch. Mild deviation of the upper thoracic trachea to the left likely reflects enlarged thyroid. Recommend chest CT for additional evaluation. Mild generalized nonspecific interstitial prominence. Correlate clinically for mild congestion and/or chronic interstitial lung disease. Nofocal consolidation or significant pleural effusion.    Impression: Cardiomegaly. Probable intrathoracic thyroid. Generalized nonspecific interstitial prominence.      < end of copied text >      DIAGNOSTIC TESTING:    [ x] Echocardiogram:   < from: TTE Echo Complete w/o Contrast w/ Doppler (11.05.20 @ 17:09) >  Summary:   1. Left ventricular ejection fraction, by visual estimation, is <20%.   2. Technically good study.   3. Severely decreased global left ventricular systolic function.   4. Basal and mid inferior wall and basal and mid inferolateral wall are abnormal as described above.   5. Dilated cardiomyopathy.   6. Moderately increased left ventricular internal cavity size.   7. Spectral Doppler shows impaired relaxation pattern of left ventricular myocardial filling (Grade I diastolic dysfunction).   8. Mildly reduced RV systolic function.   9. Mildly enlarged left atrium.  10. Normal right atrial size.  11. There is no evidence of pericardial effusion.  12. Mild mitral valve regurgitation.  13. Mild The mitral valve leaflets are tethered which is due to reduced systolic function and elevated LVDP.  14. Mild tricuspid regurgitation.  15. Sclerotic aortic valve with normal opening.  16.Estimated pulmonary artery systolic pressure is 38.1 mmHg assuming a right atrial pressure of 10 mmHg, which is consistent with borderline pulmonary hypertension.  17. There is mild aortic root calcification.    < end of copied text >       LABS:	 	    CARDIAC MARKERS:  Troponin I, Serum: 1.990 ng/mL (11-10 @ 10:28)    11 Nov 2020 07:38    141    |  103    |  12     ----------------------------<  94     3.9     |  33     |  0.87   10 Nov 2020 07:26    139    |  106    |  14     ----------------------------<  96     3.6     |  28     |  0.89   09 Nov 2020 10:12    139    |  106    |  15     ----------------------------<  85     3.9     |  32     |  1.00     Ca    9.2        11 Nov 2020 07:38  Mg     2.1       11 Nov 2020 07:38    TPro  6.4    /  Alb  3.3    /  TBili  1.1    /  DBili  x      /  AST  20     /  ALT  20     /  AlkPhos  59     11 Nov 2020 07:38                          10.5   4.44  )-----------( 205      ( 11 Nov 2020 07:38 )             33.5 ,                       10.4   4.27  )-----------( 191      ( 10 Nov 2020 07:26 )             33.6 ,                       10.7   4.17  )-----------( 197      ( 09 Nov 2020 06:36 )             34.4   proBNP:   Lipid Profile: 11-07 @ 12:37  HDL/Total Cholesterol: --  HDL Chol:              58 mg/dL  Serum Chol:            127 mg/dL  Direct LDL:            --  Triglycerides:         50 mg/dL

## 2020-11-11 NOTE — PROGRESS NOTE ADULT - ASSESSMENT
Subjective Complaints:  Historian:             Vital Signs Last 24 Hrs  T(C): 36.9 (11 Nov 2020 16:24), Max: 37 (11 Nov 2020 11:22)  T(F): 98.4 (11 Nov 2020 16:24), Max: 98.6 (11 Nov 2020 11:22)  HR: 73 (11 Nov 2020 16:24) (36 - 73)  BP: 120/73 (11 Nov 2020 16:24) (114/63 - 135/72)  BP(mean): --  RR: 18 (11 Nov 2020 16:24) (16 - 18)  SpO2: 98% (11 Nov 2020 16:24) (96% - 98%)    GENERAL PHYSICAL EXAM:  General:  Appears stated age, well-groomed, well-nourished, no distress  HEENT:  NC/AT, patent nares w/ pink mucosa, OP clear w/o lesions, PERRL, EOMI, conjunctivae clear, no thyromegaly, nodules, adenopathy, no JVD  Chest:  Full & symmetric excursion, no increased effort, breath sounds clear  Cardiovascular:  Regular rhythm, S1, S2, no murmur/rub/S3/S4, no carotid/femoral/abdominal bruit, radial/pedal pulses 2+, no edema  Abdomen:  Soft, non-tender, non-distended, normoactive bowel sounds, no HSM  Extremities:  Gait & station:   Digits:   Nails:   Joints, Bones, Muscles:   ROM:   Stability:  Skin:  No rash/erythema/ecchymoses/petechiae/wounds/abscess/warm/dry  Musculoskeletal:  Full ROM in all joints w/o swelling/tenderness/effusion        LABS:                        10.5   4.44  )-----------( 205      ( 11 Nov 2020 07:38 )             33.5     11-11    141  |  103  |  12  ----------------------------<  94  3.9   |  33<H>  |  0.87    Ca    9.2      11 Nov 2020 07:38  Mg     2.1     11-11    TPro  6.4  /  Alb  3.3  /  TBili  1.1  /  DBili  x   /  AST  20  /  ALT  20  /  AlkPhos  59  11-11    PTT - ( 10 Nov 2020 08:13 )  PTT:35.8 sec      RADIOLOGY & ADDITIONAL STUDIES:        Neurology Progress Note:      Mental Status: awaek alert  speech fluent       Cranial Nerves: 2 1/.2 intact      Motor:   arm leg 4/5         Sensory: intact      Cerebellar: defrd      Gait: unsteady       Assesment/Plan:  new cva  vdrl positive  id consult  dementia unsteady gait cta brain noted  no acute path  for pt rehab on asa 81 mg

## 2020-11-11 NOTE — PROGRESS NOTE ADULT - SUBJECTIVE AND OBJECTIVE BOX
Patient is a 80y old  Female who presents with a chief complaint of Altered Mental Status (11 Nov 2020 11:56)    INTERVAL HPI/OVERNIGHT EVENTS:  Pt was seen and examined, no acute events.    MEDICATIONS  (STANDING):  aspirin  chewable 81 milliGRAM(s) Oral daily  atorvastatin 40 milliGRAM(s) Oral at bedtime  carvedilol 3.125 milliGRAM(s) Oral every 12 hours  clopidogrel Tablet 75 milliGRAM(s) Oral daily  donepezil 5 milliGRAM(s) Oral at bedtime  furosemide    Tablet 40 milliGRAM(s) Oral daily  heparin   Injectable 5000 Unit(s) SubCutaneous every 12 hours  lisinopril 5 milliGRAM(s) Oral daily  melatonin 3 milliGRAM(s) Oral at bedtime  memantine 10 milliGRAM(s) Oral two times a day  nicotine -   7 mG/24Hr(s) Patch 1 patch Transdermal daily  regadenoson Injectable 0.4 milliGRAM(s) IV Push once    MEDICATIONS  (PRN):      Allergies  No Known Allergies      Vital Signs Last 24 Hrs  T(C): 36.9 (11 Nov 2020 16:24), Max: 37 (11 Nov 2020 11:22)  T(F): 98.4 (11 Nov 2020 16:24), Max: 98.6 (11 Nov 2020 11:22)  HR: 73 (11 Nov 2020 16:24) (36 - 73)  BP: 120/73 (11 Nov 2020 16:24) (110/60 - 135/72)  BP(mean): --  RR: 18 (11 Nov 2020 16:24) (16 - 18)  SpO2: 98% (11 Nov 2020 16:24) (96% - 98%)      PHYSICAL EXAM:  GENERAL: NAD  HEAD:  Atraumatic  EYES: PERRLA  NERVOUS SYSTEM:  Awake, alert  CHEST/LUNG: Clear  HEART: RRR  ABDOMEN: Soft, no edema  EXTREMITIES:  no edema      LABS:                        10.5   4.44  )-----------( 205      ( 11 Nov 2020 07:38 )             33.5     11-11    141  |  103  |  12  ----------------------------<  94  3.9   |  33<H>  |  0.87    Ca    9.2      11 Nov 2020 07:38  Mg     2.1     11-11    TPro  6.4  /  Alb  3.3  /  TBili  1.1  /  DBili  x   /  AST  20  /  ALT  20  /  AlkPhos  59  11-11    PTT - ( 10 Nov 2020 08:13 )  PTT:35.8 sec    CAPILLARY BLOOD GLUCOSE          RADIOLOGY & ADDITIONAL TESTS:    Imaging Personally Reviewed:  [ ] YES  [ ] NO    Consultant(s) Notes Reviewed:  [ ] YES  [ ] NO    Care Discussed with Consultants/Other Providers [ ] YES  [ ] NO

## 2020-11-11 NOTE — PROGRESS NOTE ADULT - ASSESSMENT
80 years old female with history of dementia and Hypertension and diabetes was found with wandering with cerebrovascular accident seen and lbbb with elevated trop     Acute Systolic Heart Failure/ NSTEMI:  - EF < 20 %  - S/P heparin gtt   - Continue ASA, plavix stopped  - Continue statin   - Cardiology eval appreciated  - Stress test cancelled for acute CVA, outpt follow up    Anemia:  - Microcytic anemia  - Neg occult blood  - Hb stable  - Iron studies not consistent with iron deficient     Acute/subacute CVA:  - MR brain noted  - CTA head and neck unremarkable  - Neurology eval appreciated   - On ASA, Statin , Plavix stopped    HIV??  - As per son no h/o HIV, called PCP Dr. Ortega Castillo [phone]. awaiting call back.  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    DVT ppx

## 2020-11-11 NOTE — CONSULT NOTE ADULT - SUBJECTIVE AND OBJECTIVE BOX
79 yo female with unknown pmh [place]. Pt gave name and . Pt is AOX2, shivering but otherwise deneis any complaints. Police currently attempting to look for family.    No fever/chills, No photophobia/eye pain/changes in vision, No ear pain/sore throat/dysphagia, No chest pain/palpitations, no SOB/cough, no wheeze/stridor, No abdominal pain, No N/V/D, no dysuria/frequency/discharge, No neck/back pain, no rash, no changes in neurological status/function.   (2020 14:02)      PAST MEDICAL & SURGICAL HISTORY:      SOCHX:   tobacco,  -  alcohol    FMHX: FA/MO  - contributory       Recent Travel:    Immunizations:    Allergies    No Known Allergies    Intolerances        MEDICATIONS  (STANDING):  aspirin  chewable 81 milliGRAM(s) Oral daily  atorvastatin 40 milliGRAM(s) Oral at bedtime  carvedilol 3.125 milliGRAM(s) Oral every 12 hours  clopidogrel Tablet 75 milliGRAM(s) Oral daily  donepezil 5 milliGRAM(s) Oral at bedtime  furosemide    Tablet 40 milliGRAM(s) Oral daily  heparin   Injectable 5000 Unit(s) SubCutaneous every 12 hours  lisinopril 5 milliGRAM(s) Oral daily  lopinavir 200 mG/ritonavir 50 mG 1 Tablet(s) Oral every 12 hours  melatonin 3 milliGRAM(s) Oral at bedtime  memantine 10 milliGRAM(s) Oral two times a day  nicotine -   7 mG/24Hr(s) Patch 1 patch Transdermal daily  regadenoson Injectable 0.4 milliGRAM(s) IV Push once        REVIEW OF SYSTEMS:      IN PROGRESS       VITAL SIGNS:    T(C): 37 (20 @ 11:22), Max: 37 (20 @ 11:22)  T(F): 98.6 (20 @ 11:22), Max: 98.6 (20 @ 11:22)  HR: 70 (20 @ 11:22) (36 - 70)  BP: 122/76 (20 @ 11:22) (110/60 - 135/72)  RR: 18 (20 @ 11:22) (16 - 18)  SpO2: 96% (20 @ 11:22) (90% - 98%)    PHYSICAL EXAM:    IN PROGRESS     LABS:                         10.5   4.44  )-----------( 205      ( 2020 07:38 )             33.5         141  |  103  |  12  ----------------------------<  94  3.9   |  33<H>  |  0.87    Ca    9.2      2020 07:38  Mg     2.1         TPro  6.4  /  Alb  3.3  /  TBili  1.1  /  DBili  x   /  AST  20  /  ALT  20  /  AlkPhos  59      LIVER FUNCTIONS - ( 2020 07:38 )  Alb: 3.3 g/dL / Pro: 6.4 gm/dL / ALK PHOS: 59 U/L / ALT: 20 U/L / AST: 20 U/L / GGT: x           PTT - ( 10 Nov 2020 08:13 )  PTT:35.8 sec      CARDIAC MARKERS ( 10 Nov 2020 10:28 )  1.990 ng/mL / x     / x     / x     / x                        Culture Results:   >=3 organisms. Probable collection contamination. ( @ 09:56)                Radiology:          81 yo female with unknown pmh found [place] highway. Pt gave name and . Pt is AOX2, shivering but otherwise deneis any complaints. Police currently attempting to look for family.    No fever/chills, No photophobia/eye pain/changes in vision, No ear pain/sore throat/dysphagia, No chest pain/palpitations, no SOB/cough, no wheeze/stridor, No abdominal pain, No N/V/D, no dysuria/frequency/discharge, No neck/back pain, no rash, no changes in neurological status/function.   (2020 14:02)      PAST MEDICAL & SURGICAL HISTORY:      SOCHX:   tobacco,  -  alcohol    FMHX: FA/MO  - contributory       Recent Travel:    Immunizations:    Allergies    No Known Allergies    Intolerances        MEDICATIONS  (STANDING):  aspirin  chewable 81 milliGRAM(s) Oral daily  atorvastatin 40 milliGRAM(s) Oral at bedtime  carvedilol 3.125 milliGRAM(s) Oral every 12 hours  clopidogrel Tablet 75 milliGRAM(s) Oral daily  donepezil 5 milliGRAM(s) Oral at bedtime  furosemide    Tablet 40 milliGRAM(s) Oral daily  heparin   Injectable 5000 Unit(s) SubCutaneous every 12 hours  lisinopril 5 milliGRAM(s) Oral daily  lopinavir 200 mG/ritonavir 50 mG 1 Tablet(s) Oral every 12 hours  melatonin 3 milliGRAM(s) Oral at bedtime  memantine 10 milliGRAM(s) Oral two times a day  nicotine -   7 mG/24Hr(s) Patch 1 patch Transdermal daily  regadenoson Injectable 0.4 milliGRAM(s) IV Push once        REVIEW OF SYSTEMS:    confused cannot give reliable history       VITAL SIGNS:    T(C): 37 (20 @ 11:22), Max: 37 (20 @ 11:22)  T(F): 98.6 (20 @ 11:22), Max: 98.6 (20 @ 11:22)  HR: 70 (20 @ 11:22) (36 - 70)  BP: 122/76 (20 @ 11:22) (110/60 - 135/72)  RR: 18 (20 @ 11:22) (16 - 18)  SpO2: 96% (20 @ 11:22) (90% - 98%)    PHYSICAL EXAM:    neck supple , no neck rigidity   CVS - normal S1S2  Res -clear   ABd - soft   CNS - no focal deficits         LABS:                         10.5   4.44  )-----------( 205      ( 2020 07:38 )             33.5         141  |  103  |  12  ----------------------------<  94  3.9   |  33<H>  |  0.87    Ca    9.2      2020 07:38  Mg     2.1         TPro  6.4  /  Alb  3.3  /  TBili  1.1  /  DBili  x   /  AST  20  /  ALT  20  /  AlkPhos  59      LIVER FUNCTIONS - ( 2020 07:38 )  Alb: 3.3 g/dL / Pro: 6.4 gm/dL / ALK PHOS: 59 U/L / ALT: 20 U/L / AST: 20 U/L / GGT: x           PTT - ( 10 Nov 2020 08:13 )  PTT:35.8 sec      CARDIAC MARKERS ( 10 Nov 2020 10:28 )  1.990 ng/mL / x     / x     / x     / x                        Culture Results:   >=3 organisms. Probable collection contamination. ( @ 09:56)                Radiology:

## 2020-11-11 NOTE — CONSULT NOTE ADULT - ASSESSMENT
79 yo female with unknown pmh found walking on highway, ams     Brain CT without contrast: acute/subacute infarcts in the bilateral occipital lobes and right cerebellum. An acute right frontal infarct is better seen on MRI. No evidence of hemorrhagic transformation.    CT angiography - No evidence of vascular dissection.No large vessel occlusion. No evidence of aneurysm.    hiv vl not detected   HIV ag/ab negative   RPR reactive 1:2  attempting to look for family?     EVALUATION IN PROGRESS  NOTE TO CONTINUE            81 yo female with unknown pmh found walking on highway, AMS     Acute multiple infarcts Stroke r/o embolic ?    Brain CT without contrast: acute/subacute infarcts in the bilateral occipital lobes and right cerebellum. An acute right frontal infarct is better seen on MRI. No evidence of hemorrhagic transformation.  CT angiography - No evidence of vascular dissection.No large vessel occlusion. No evidence of aneurysm.    patient reported she has never been diagnosed with HIV or HCV/ HBV, not taking any meds? , patient is confused and cannot give reliable history   hiv vl not detected   HIV ag/ab negative   RPR reactive 1:2 , not sure to treat , will obtain collateral info   i attempted 2 times to call her son Juan , [phone] , no    will try to obtain collateral info   for pos RPR with low titer , will decide after obtain collateral info  neurology on case   echo  fu blood cultures   we will fu  thanks

## 2020-11-11 NOTE — PROGRESS NOTE ADULT - ASSESSMENT
ASSESSMENT:  80-year-old female with unclear medical history found along highway confused. Seen to have left bundle branch block of unclear duration and echo reveals severe global dilated cardiomyopathy, EF <20%, GR I DD. Also non-STEMI noted with peak troponin of approximately 27. Seen ambulating without chest complaints or shortness of breath. Noted to be a smoker currently on nicotine replacement patch. On 11/10/20 MRI Brain report acute/subacute infarct. Heparin infusion stopped and [facility] Stress test cancelled.    PLAN:     -Neurology for CVA   -[facility] Stress test  for better cardiac risk assessment cancelled for now. Can be completed as outpatient.  -Continue  aspirin and Plavix for dual antiplatelet therapy and presumed CAD management if cleared by neurology.   -Continue  Lisinopril and Coreg for dilated cardiomyopathy treatment.   -continue Lasix and Statin.   -Smoking cessation was encouraged .   -Background therapy with HAART per medicine.     -Goals of care to be determined/ no aggressive cardiac interventions at this time.  Optimize Medical management. Will sign off at this time. Please call if have questions. Follow up with cardiology 2 weeks after discharge.

## 2020-11-12 LAB
ANION GAP SERPL CALC-SCNC: 5 MMOL/L — SIGNIFICANT CHANGE UP (ref 5–17)
BUN SERPL-MCNC: 19 MG/DL — SIGNIFICANT CHANGE UP (ref 7–23)
CALCIUM SERPL-MCNC: 8.8 MG/DL — SIGNIFICANT CHANGE UP (ref 8.5–10.1)
CHLORIDE SERPL-SCNC: 106 MMOL/L — SIGNIFICANT CHANGE UP (ref 96–108)
CO2 SERPL-SCNC: 31 MMOL/L — SIGNIFICANT CHANGE UP (ref 22–31)
CREAT SERPL-MCNC: 0.88 MG/DL — SIGNIFICANT CHANGE UP (ref 0.5–1.3)
GLUCOSE SERPL-MCNC: 91 MG/DL — SIGNIFICANT CHANGE UP (ref 70–99)
HCT VFR BLD CALC: 33.6 % — LOW (ref 34.5–45)
HGB BLD-MCNC: 10.9 G/DL — LOW (ref 11.5–15.5)
MCHC RBC-ENTMCNC: 24.8 PG — LOW (ref 27–34)
MCHC RBC-ENTMCNC: 32.4 GM/DL — SIGNIFICANT CHANGE UP (ref 32–36)
MCV RBC AUTO: 76.5 FL — LOW (ref 80–100)
NRBC # BLD: 0 /100 WBCS — SIGNIFICANT CHANGE UP (ref 0–0)
PLATELET # BLD AUTO: 200 K/UL — SIGNIFICANT CHANGE UP (ref 150–400)
POTASSIUM SERPL-MCNC: 3.7 MMOL/L — SIGNIFICANT CHANGE UP (ref 3.5–5.3)
POTASSIUM SERPL-SCNC: 3.7 MMOL/L — SIGNIFICANT CHANGE UP (ref 3.5–5.3)
RBC # BLD: 4.39 M/UL — SIGNIFICANT CHANGE UP (ref 3.8–5.2)
RBC # FLD: 13.8 % — SIGNIFICANT CHANGE UP (ref 10.3–14.5)
RPR SER-TITR: (no result)
RPR SERPL-ACNC: REACTIVE
SODIUM SERPL-SCNC: 142 MMOL/L — SIGNIFICANT CHANGE UP (ref 135–145)
T PALLIDUM AB TITR SER: POSITIVE
WBC # BLD: 5.6 K/UL — SIGNIFICANT CHANGE UP (ref 3.8–10.5)
WBC # FLD AUTO: 5.6 K/UL — SIGNIFICANT CHANGE UP (ref 3.8–10.5)

## 2020-11-12 PROCEDURE — 99233 SBSQ HOSP IP/OBS HIGH 50: CPT

## 2020-11-12 RX ORDER — PENICILLIN G POTASSIUM 5000000 [IU]/1
POWDER, FOR SOLUTION INTRAMUSCULAR; INTRAPLEURAL; INTRATHECAL; INTRAVENOUS
Refills: 0 | Status: DISCONTINUED | OUTPATIENT
Start: 2020-11-12 | End: 2020-11-19

## 2020-11-12 RX ORDER — PENICILLIN G POTASSIUM 5000000 [IU]/1
3 POWDER, FOR SOLUTION INTRAMUSCULAR; INTRAPLEURAL; INTRATHECAL; INTRAVENOUS EVERY 4 HOURS
Refills: 0 | Status: DISCONTINUED | OUTPATIENT
Start: 2020-11-12 | End: 2020-11-19

## 2020-11-12 RX ORDER — PENICILLIN G POTASSIUM 5000000 [IU]/1
4 POWDER, FOR SOLUTION INTRAMUSCULAR; INTRAPLEURAL; INTRATHECAL; INTRAVENOUS EVERY 4 HOURS
Refills: 0 | Status: DISCONTINUED | OUTPATIENT
Start: 2020-11-12 | End: 2020-11-12

## 2020-11-12 RX ORDER — PENICILLIN G POTASSIUM 5000000 [IU]/1
4 POWDER, FOR SOLUTION INTRAMUSCULAR; INTRAPLEURAL; INTRATHECAL; INTRAVENOUS ONCE
Refills: 0 | Status: COMPLETED | OUTPATIENT
Start: 2020-11-12 | End: 2020-11-12

## 2020-11-12 RX ADMIN — DONEPEZIL HYDROCHLORIDE 5 MILLIGRAM(S): 10 TABLET, FILM COATED ORAL at 21:11

## 2020-11-12 RX ADMIN — MEMANTINE HYDROCHLORIDE 10 MILLIGRAM(S): 10 TABLET ORAL at 18:34

## 2020-11-12 RX ADMIN — LISINOPRIL 5 MILLIGRAM(S): 2.5 TABLET ORAL at 05:30

## 2020-11-12 RX ADMIN — CLOPIDOGREL BISULFATE 75 MILLIGRAM(S): 75 TABLET, FILM COATED ORAL at 13:19

## 2020-11-12 RX ADMIN — Medication 1 PATCH: at 13:15

## 2020-11-12 RX ADMIN — Medication 81 MILLIGRAM(S): at 13:19

## 2020-11-12 RX ADMIN — Medication 1 PATCH: at 18:35

## 2020-11-12 RX ADMIN — Medication 1 PATCH: at 13:18

## 2020-11-12 RX ADMIN — HEPARIN SODIUM 5000 UNIT(S): 5000 INJECTION INTRAVENOUS; SUBCUTANEOUS at 05:30

## 2020-11-12 RX ADMIN — HEPARIN SODIUM 5000 UNIT(S): 5000 INJECTION INTRAVENOUS; SUBCUTANEOUS at 18:33

## 2020-11-12 RX ADMIN — PENICILLIN G POTASSIUM 100 MILLION UNIT(S): 5000000 POWDER, FOR SOLUTION INTRAMUSCULAR; INTRAPLEURAL; INTRATHECAL; INTRAVENOUS at 18:33

## 2020-11-12 RX ADMIN — ATORVASTATIN CALCIUM 40 MILLIGRAM(S): 80 TABLET, FILM COATED ORAL at 21:11

## 2020-11-12 RX ADMIN — Medication 3 MILLIGRAM(S): at 21:11

## 2020-11-12 RX ADMIN — PENICILLIN G POTASSIUM 100 MILLION UNIT(S): 5000000 POWDER, FOR SOLUTION INTRAMUSCULAR; INTRAPLEURAL; INTRATHECAL; INTRAVENOUS at 13:17

## 2020-11-12 RX ADMIN — PENICILLIN G POTASSIUM 100 MILLION UNIT(S): 5000000 POWDER, FOR SOLUTION INTRAMUSCULAR; INTRAPLEURAL; INTRATHECAL; INTRAVENOUS at 21:48

## 2020-11-12 RX ADMIN — MEMANTINE HYDROCHLORIDE 10 MILLIGRAM(S): 10 TABLET ORAL at 05:30

## 2020-11-12 RX ADMIN — Medication 40 MILLIGRAM(S): at 05:30

## 2020-11-12 RX ADMIN — PENICILLIN G POTASSIUM 100 MILLION UNIT(S): 5000000 POWDER, FOR SOLUTION INTRAMUSCULAR; INTRAPLEURAL; INTRATHECAL; INTRAVENOUS at 15:57

## 2020-11-12 RX ADMIN — Medication 1 PATCH: at 19:15

## 2020-11-12 RX ADMIN — CARVEDILOL PHOSPHATE 3.12 MILLIGRAM(S): 80 CAPSULE, EXTENDED RELEASE ORAL at 18:33

## 2020-11-12 NOTE — PROGRESS NOTE ADULT - SUBJECTIVE AND OBJECTIVE BOX
HPI:   79yo female with unknown pmh found [place] highway. Pt gave name and . Pt is AOX2, shivering but otherwise deneis any complaints. Police currently attempting to look for family.    	No fever/chills, No photophobia/eye pain/changes in vision, No ear pain/sore throat/dysphagia, No chest pain/palpitations, no SOB/cough, no wheeze/stridor, No abdominal pain, No N/V/D, no dysuria/frequency/discharge, No neck/back pain, no rash, no changes in neurological status/function.   (2020 14:02)      Allergies    No Known Allergies    Intolerances        MEDICATIONS  (STANDING):  aspirin  chewable 81 milliGRAM(s) Oral daily  atorvastatin 40 milliGRAM(s) Oral at bedtime  carvedilol 3.125 milliGRAM(s) Oral every 12 hours  clopidogrel Tablet 75 milliGRAM(s) Oral daily  donepezil 5 milliGRAM(s) Oral at bedtime  furosemide    Tablet 40 milliGRAM(s) Oral daily  heparin   Injectable 5000 Unit(s) SubCutaneous every 12 hours  lisinopril 5 milliGRAM(s) Oral daily  melatonin 3 milliGRAM(s) Oral at bedtime  memantine 10 milliGRAM(s) Oral two times a day  nicotine -   7 mG/24Hr(s) Patch 1 patch Transdermal daily  regadenoson Injectable 0.4 milliGRAM(s) IV Push once    MEDICATIONS  (PRN):      REVIEW OF SYSTEMS:    denies any symptoms , confused and cannot give reliable ho     VITAL SIGNS:  T(C): 37.1 (20 @ 04:53), Max: 37.1 (20 @ 04:53)  T(F): 98.7 (20 @ 04:53), Max: 98.7 (20 @ 04:53)  HR: 59 (20 @ 04:53) (59 - 73)  BP: 118/72 (20 @ 04:53) (110/65 - 122/76)  RR: 16 (20 @ 04:53) (16 - 18)  SpO2: 95% (20 @ 04:53) (94% - 98%)  Wt(kg): --    PHYSICAL EXAM:    GENERAL: not in any distress  HEENT: Neck is supple, normocephalic, atraumatic   CHEST/LUNG: Clear to percussion bilaterally; No rales, rhonchi, wheezing  HEART: Regular rate and rhythm; No murmurs, rubs, or gallops  ABDOMEN: Soft, Nontender, Nondistended; Bowel sounds present, no rebound   EXTREMITIES:  2+ Peripheral Pulses, No clubbing, cyanosis, or edema  GENITOURINARY:   SKIN: No rashes or lesions  BACK: no pressor sore   NERVOUS SYSTEM:  Alert & confused     LABS:                         10.9   5.60  )-----------( 200      ( 2020 08:14 )             33.6         142  |  106  |  19  ----------------------------<  91  3.7   |  31  |  0.88    Ca    8.8      2020 08:14  Mg     2.1     -    TPro  6.4  /  Alb  3.3  /  TBili  1.1  /  DBili  x   /  AST  20  /  ALT  20  /  AlkPhos  59  11-11    LIVER FUNCTIONS - ( 2020 07:38 )  Alb: 3.3 g/dL / Pro: 6.4 gm/dL / ALK PHOS: 59 U/L / ALT: 20 U/L / AST: 20 U/L / GGT: x                 CARDIAC MARKERS ( 10 Nov 2020 10:28 )  1.990 ng/mL / x     / x     / x     / x              Sedimentation Rate, Erythrocyte: 9 mm/hr ( @ 12:41)            Culture Results:   >=3 organisms. Probable collection contamination. ( @ 09:56)                Radiology:

## 2020-11-12 NOTE — PROGRESS NOTE ADULT - SUBJECTIVE AND OBJECTIVE BOX
Patient is a 80y old  Female who presents with a chief complaint of Altered Mental Status (12 Nov 2020 09:26)      INTERVAL HPI/OVERNIGHT EVENTS:  Pt was seen and examined, no acute events.    MEDICATIONS  (STANDING):  aspirin  chewable 81 milliGRAM(s) Oral daily  atorvastatin 40 milliGRAM(s) Oral at bedtime  carvedilol 3.125 milliGRAM(s) Oral every 12 hours  donepezil 5 milliGRAM(s) Oral at bedtime  furosemide    Tablet 40 milliGRAM(s) Oral daily  heparin   Injectable 5000 Unit(s) SubCutaneous every 12 hours  lisinopril 5 milliGRAM(s) Oral daily  melatonin 3 milliGRAM(s) Oral at bedtime  memantine 10 milliGRAM(s) Oral two times a day  nicotine -   7 mG/24Hr(s) Patch 1 patch Transdermal daily  penicillin   G  potassium  IVPB      penicillin   G  potassium  IVPB 3 Million Unit(s) IV Intermittent every 4 hours    MEDICATIONS  (PRN):      Allergies  No Known Allergies        Vital Signs Last 24 Hrs  T(C): 36.7 (12 Nov 2020 16:31), Max: 37.1 (12 Nov 2020 04:53)  T(F): 98 (12 Nov 2020 16:31), Max: 98.7 (12 Nov 2020 04:53)  HR: 66 (12 Nov 2020 16:31) (59 - 78)  BP: 120/74 (12 Nov 2020 16:31) (110/65 - 120/74)  BP(mean): --  RR: 17 (12 Nov 2020 16:31) (15 - 18)  SpO2: 97% (12 Nov 2020 16:31) (94% - 97%)      PHYSICAL EXAM:  GENERAL: NAD  HEAD:  Atraumatic  EYES: PERRLA  NERVOUS SYSTEM:  Awake, alert  CHEST/LUNG: Clear  HEART: RRR  ABDOMEN: Soft, non tender  EXTREMITIES:  no edema      LABS:                        10.9   5.60  )-----------( 200      ( 12 Nov 2020 08:14 )             33.6     11-12    142  |  106  |  19  ----------------------------<  91  3.7   |  31  |  0.88    Ca    8.8      12 Nov 2020 08:14  Mg     2.1     11-11    TPro  6.4  /  Alb  3.3  /  TBili  1.1  /  DBili  x   /  AST  20  /  ALT  20  /  AlkPhos  59  11-11        CAPILLARY BLOOD GLUCOSE          Culture - Blood (collected 11 Nov 2020 15:00)  Source: .Blood Blood  Preliminary Report (12 Nov 2020 16:02):    No growth to date.      RADIOLOGY & ADDITIONAL TESTS:    Imaging Personally Reviewed:  [ ] YES  [ ] NO    Consultant(s) Notes Reviewed:  [ ] YES  [ ] NO    Care Discussed with Consultants/Other Providers [ ] YES  [ ] NO

## 2020-11-12 NOTE — PROGRESS NOTE ADULT - ASSESSMENT
Subjective Complaints:  Historian:             Vital Signs Last 24 Hrs  T(C): 36.7 (12 Nov 2020 16:31), Max: 37.1 (12 Nov 2020 04:53)  T(F): 98 (12 Nov 2020 16:31), Max: 98.7 (12 Nov 2020 04:53)  HR: 66 (12 Nov 2020 16:31) (59 - 78)  BP: 120/74 (12 Nov 2020 16:31) (110/65 - 120/74)  BP(mean): --  RR: 17 (12 Nov 2020 16:31) (15 - 18)  SpO2: 97% (12 Nov 2020 16:31) (94% - 97%)    GENERAL PHYSICAL EXAM:  General:  Appears stated age, well-groomed, well-nourished, no distress  HEENT:  NC/AT, patent nares w/ pink mucosa, OP clear w/o lesions, PERRL, EOMI, conjunctivae clear, no thyromegaly, nodules, adenopathy, no JVD  Chest:  Full & symmetric excursion, no increased effort, breath sounds clear  Cardiovascular:  Regular rhythm, S1, S2, no murmur/rub/S3/S4, no carotid/femoral/abdominal bruit, radial/pedal pulses 2+, no edema  Abdomen:  Soft, non-tender, non-distended, normoactive bowel sounds, no HSM  Extremities:  Gait & station:   Digits:   Nails:   Joints, Bones, Muscles:   ROM:   Stability:  Skin:  No rash/erythema/ecchymoses/petechiae/wounds/abscess/warm/dry  Musculoskeletal:  Full ROM in all joints w/o swelling/tenderness/effusion        LABS:                        10.9   5.60  )-----------( 200      ( 12 Nov 2020 08:14 )             33.6     11-12    142  |  106  |  19  ----------------------------<  91  3.7   |  31  |  0.88    Ca    8.8      12 Nov 2020 08:14  Mg     2.1     11-11    TPro  6.4  /  Alb  3.3  /  TBili  1.1  /  DBili  x   /  AST  20  /  ALT  20  /  AlkPhos  59  11-11          RADIOLOGY & ADDITIONAL STUDIES:        Neurology Progress Note:      Mental Status: awake  alert speech fluent  confused       Cranial Nerves: 2 1/2 intact      Motor:   arm leg 4/5         Sensory:intact      Cerebellar: defrd      Gait: unsteady       Assesment/Plan: bryanna occipital and cerebellar infarction  confused vdrl positive  for spinal tap family called for consent left message id folow up for pcn rx on asa

## 2020-11-12 NOTE — PHARMACOTHERAPY INTERVENTION NOTE - COMMENTS
Recommended reducing penicillin G dose to 3 million units IV q4h since CrCl is 42. Dose should be 75% of usual dose when CrCl 10-50.

## 2020-11-12 NOTE — PROGRESS NOTE ADULT - ASSESSMENT
80 years old female with history of dementia and Hypertension and diabetes was found with wandering with cerebrovascular accident seen and lbbb with elevated trop     Acute Systolic Heart Failure/ NSTEMI:  - EF < 20 %  - S/P heparin gtt   - Trop trending down  - Continue ASA, plavix stopped  - Continue statin   - Cardiology eval appreciated  - Stress test cancelled for acute CVA, outpt follow up    Anemia:  - Microcytic anemia  - Neg occult blood  - Hb stable  - Iron studies not consistent with iron deficient     Acute/subacute CVA:  - MR brain noted  - CTA head and neck unremarkable  - Neurology eval appreciated   - On ASA, Statin , Plavix stopped    Tertiary syphilis:  - Started on Penicillin  - LP per neuro  - ID following    Essential hypertension:  - Continue home meds.    DVT ppx    Pt was on Kaletra on admission  - As per son no h/o HIV, called PCP Dr. Ortega Castillo [phone]. left msg with staff, awaiting call back.  - Meds stopped  - HIV neg here, Viral load undetectable  - Called pharmacy, pt was prescribed 2 doses of Kaletra, unclear why, possibly for COVID?

## 2020-11-12 NOTE — CHART NOTE - NSCHARTNOTEFT_GEN_A_CORE
Assessment: Pt admitted with AMS, CVA. Pt c HTN, dyslipidemia, NSTEM, metabolic encephalopathy; PMH T2DM. Pt alert, confused & irritated at times. Pt denied any N/V/D/C, unable to recall last BM but reported not being constipated. Pt reported consuming good PO intake and has a good appetite. Provided encouragement for pt to continue good PO.     Factors impacting intake: [x ] none [ ] nausea  [ ] vomiting [ ] diarrhea [ ] constipation  [ ]chewing problems [ ] swallowing issues  [ ] other:     Diet Prescription: Diet, Regular:   Supplement Feeding Modality:  Oral  Ensure Enlive Cans or Servings Per Day:  1       Frequency:  Two Times a day (11-06-20 @ 15:16)    Intake: ~75% of meals and supplements     Current Weight: Weight (kg): 51 (11-05 @ 18:10)  % Weight Change    Physical appearance: BMI 20.1, No edema noted, pt on phone and resistant to nutrition-focused physical exam. Per RD observations; orbital [moderate], interosseous [moderate], clavicle [severe], [place] [moderate]    Pertinent Medications: MEDICATIONS  (STANDING):  aspirin  chewable 81 milliGRAM(s) Oral daily  atorvastatin 40 milliGRAM(s) Oral at bedtime  carvedilol 3.125 milliGRAM(s) Oral every 12 hours  clopidogrel Tablet 75 milliGRAM(s) Oral daily  donepezil 5 milliGRAM(s) Oral at bedtime  furosemide    Tablet 40 milliGRAM(s) Oral daily  heparin   Injectable 5000 Unit(s) SubCutaneous every 12 hours  lisinopril 5 milliGRAM(s) Oral daily  melatonin 3 milliGRAM(s) Oral at bedtime  memantine 10 milliGRAM(s) Oral two times a day  nicotine -   7 mG/24Hr(s) Patch 1 patch Transdermal daily  penicillin   G  potassium  IVPB      penicillin   G  potassium  IVPB 4 Million Unit(s) IV Intermittent once  penicillin   G  potassium  IVPB 3 Million Unit(s) IV Intermittent every 4 hours  regadenoson Injectable 0.4 milliGRAM(s) IV Push once    MEDICATIONS  (PRN):    Pertinent Labs: 11-12 Na 142 mmol/L Glu 91 mg/dL K+ 3.7 mmol/L Cr 0.88 mg/dL BUN 19 mg/dL Phos n/a   Alb n/a   PAB n/a   Hgb 10.9 g/dL<L> Hct 33.6 %<L> HgA1C n/a    Glucose, Serum: 91 mg/dL    Skin: intact     Estimated Needs:   [x ] no change since previous assessment (11-)   [ ] recalculated:     Previous Nutrition Diagnosis:   Nutrition Diagnostic Terminology #1 Malnutrition...   Malnutrition Moderate malnutrition in context of chronic illness.   Etiology Inadequate energy/protein intake related to dementia, AMS.   Signs/Symptoms Physical findings of moderate/severe fat depletion & muscle wasting.     Goal/Expected Outcome Pt to consume >75% meals/supplements; maintain wt +/- 2# during hospitalization.    Nutrition Diagnosis is [ x] ongoing  [ ] resolved  [ x] improved  [ ] not applicable       New Nutrition Diagnosis: [x ] not applicable       Interventions:   Recommend  [x ] Continue: Current Diet order as prescribed   [ ] Change Diet To:  [ ] Nutrition Supplement:  [ ] Nutrition Support:  [x ] Other:  Food preferences prn     Monitoring and Evaluation:   [ x] PO intake [ x ] Tolerance to diet prescription [ x ] weights [ x ] labs[ x ] follow up per protocol  [ ] other:

## 2020-11-12 NOTE — PROGRESS NOTE ADULT - ASSESSMENT
81 yo female with unknown pmh [place], AMS   Acute multiple infarcts Stroke r/o embolic ? r/o  neurosyphilis     Brain CT without contrast: acute/subacute infarcts in the bilateral occipital lobes and right cerebellum. An acute right frontal infarct is better seen on MRI. No evidence of hemorrhagic transformation.  CT angiography - No evidence of vascular dissection.No large vessel occlusion. No evidence of aneurysm.    patient reported she has never been diagnosed with syphilis , no treatment ho, HIV or HCV/ HBV, not taking any meds? , however patient is somewhat confused and cannot give reliable history   hiv vl not detected   HIV ag/ab negative x2   RPR reactive 1:2 , i discussed with neurology Dr Berger on phone, will need to rule out tertiary neurosyphilis and neurology will try for LP   please send for csf cell count, csf culture, csf vdrl as well as csf FDA test   will start penicillin iv   case discussed with Dr Berger and Primary attending   fu LP

## 2020-11-13 LAB
ALBUMIN SERPL ELPH-MCNC: 3.4 G/DL — SIGNIFICANT CHANGE UP (ref 3.3–5)
ALP SERPL-CCNC: 57 U/L — SIGNIFICANT CHANGE UP (ref 40–120)
ALT FLD-CCNC: 27 U/L — SIGNIFICANT CHANGE UP (ref 12–78)
ANION GAP SERPL CALC-SCNC: 5 MMOL/L — SIGNIFICANT CHANGE UP (ref 5–17)
AST SERPL-CCNC: 22 U/L — SIGNIFICANT CHANGE UP (ref 15–37)
BILIRUB SERPL-MCNC: 0.9 MG/DL — SIGNIFICANT CHANGE UP (ref 0.2–1.2)
BUN SERPL-MCNC: 20 MG/DL — SIGNIFICANT CHANGE UP (ref 7–23)
CALCIUM SERPL-MCNC: 9 MG/DL — SIGNIFICANT CHANGE UP (ref 8.5–10.1)
CHLORIDE SERPL-SCNC: 103 MMOL/L — SIGNIFICANT CHANGE UP (ref 96–108)
CO2 SERPL-SCNC: 32 MMOL/L — HIGH (ref 22–31)
CREAT SERPL-MCNC: 1.06 MG/DL — SIGNIFICANT CHANGE UP (ref 0.5–1.3)
GLUCOSE SERPL-MCNC: 102 MG/DL — HIGH (ref 70–99)
HCT VFR BLD CALC: 32.5 % — LOW (ref 34.5–45)
HGB BLD-MCNC: 10.3 G/DL — LOW (ref 11.5–15.5)
MCHC RBC-ENTMCNC: 24.3 PG — LOW (ref 27–34)
MCHC RBC-ENTMCNC: 31.7 GM/DL — LOW (ref 32–36)
MCV RBC AUTO: 76.8 FL — LOW (ref 80–100)
NRBC # BLD: 0 /100 WBCS — SIGNIFICANT CHANGE UP (ref 0–0)
PLATELET # BLD AUTO: 213 K/UL — SIGNIFICANT CHANGE UP (ref 150–400)
POTASSIUM SERPL-MCNC: 3.7 MMOL/L — SIGNIFICANT CHANGE UP (ref 3.5–5.3)
POTASSIUM SERPL-SCNC: 3.7 MMOL/L — SIGNIFICANT CHANGE UP (ref 3.5–5.3)
PROT SERPL-MCNC: 6.6 GM/DL — SIGNIFICANT CHANGE UP (ref 6–8.3)
RBC # BLD: 4.23 M/UL — SIGNIFICANT CHANGE UP (ref 3.8–5.2)
RBC # FLD: 13.7 % — SIGNIFICANT CHANGE UP (ref 10.3–14.5)
SODIUM SERPL-SCNC: 140 MMOL/L — SIGNIFICANT CHANGE UP (ref 135–145)
WBC # BLD: 6.49 K/UL — SIGNIFICANT CHANGE UP (ref 3.8–10.5)
WBC # FLD AUTO: 6.49 K/UL — SIGNIFICANT CHANGE UP (ref 3.8–10.5)

## 2020-11-13 PROCEDURE — 99233 SBSQ HOSP IP/OBS HIGH 50: CPT

## 2020-11-13 RX ORDER — LIDOCAINE HCL 20 MG/ML
10 VIAL (ML) INJECTION ONCE
Refills: 0 | Status: COMPLETED | OUTPATIENT
Start: 2020-11-13 | End: 2020-11-14

## 2020-11-13 RX ADMIN — Medication 1 PATCH: at 19:41

## 2020-11-13 RX ADMIN — PENICILLIN G POTASSIUM 100 MILLION UNIT(S): 5000000 POWDER, FOR SOLUTION INTRAMUSCULAR; INTRAPLEURAL; INTRATHECAL; INTRAVENOUS at 03:17

## 2020-11-13 RX ADMIN — PENICILLIN G POTASSIUM 100 MILLION UNIT(S): 5000000 POWDER, FOR SOLUTION INTRAMUSCULAR; INTRAPLEURAL; INTRATHECAL; INTRAVENOUS at 21:22

## 2020-11-13 RX ADMIN — CARVEDILOL PHOSPHATE 3.12 MILLIGRAM(S): 80 CAPSULE, EXTENDED RELEASE ORAL at 17:34

## 2020-11-13 RX ADMIN — Medication 1 PATCH: at 17:40

## 2020-11-13 RX ADMIN — Medication 40 MILLIGRAM(S): at 05:07

## 2020-11-13 RX ADMIN — PENICILLIN G POTASSIUM 100 MILLION UNIT(S): 5000000 POWDER, FOR SOLUTION INTRAMUSCULAR; INTRAPLEURAL; INTRATHECAL; INTRAVENOUS at 05:06

## 2020-11-13 RX ADMIN — Medication 3 MILLIGRAM(S): at 21:22

## 2020-11-13 RX ADMIN — MEMANTINE HYDROCHLORIDE 10 MILLIGRAM(S): 10 TABLET ORAL at 05:07

## 2020-11-13 RX ADMIN — HEPARIN SODIUM 5000 UNIT(S): 5000 INJECTION INTRAVENOUS; SUBCUTANEOUS at 05:07

## 2020-11-13 RX ADMIN — Medication 1 PATCH: at 12:00

## 2020-11-13 RX ADMIN — DONEPEZIL HYDROCHLORIDE 5 MILLIGRAM(S): 10 TABLET, FILM COATED ORAL at 21:22

## 2020-11-13 RX ADMIN — Medication 1 PATCH: at 17:39

## 2020-11-13 RX ADMIN — Medication 81 MILLIGRAM(S): at 11:57

## 2020-11-13 RX ADMIN — LISINOPRIL 5 MILLIGRAM(S): 2.5 TABLET ORAL at 05:07

## 2020-11-13 RX ADMIN — ATORVASTATIN CALCIUM 40 MILLIGRAM(S): 80 TABLET, FILM COATED ORAL at 21:22

## 2020-11-13 RX ADMIN — CARVEDILOL PHOSPHATE 3.12 MILLIGRAM(S): 80 CAPSULE, EXTENDED RELEASE ORAL at 05:07

## 2020-11-13 RX ADMIN — HEPARIN SODIUM 5000 UNIT(S): 5000 INJECTION INTRAVENOUS; SUBCUTANEOUS at 17:34

## 2020-11-13 RX ADMIN — MEMANTINE HYDROCHLORIDE 10 MILLIGRAM(S): 10 TABLET ORAL at 17:34

## 2020-11-13 RX ADMIN — PENICILLIN G POTASSIUM 100 MILLION UNIT(S): 5000000 POWDER, FOR SOLUTION INTRAMUSCULAR; INTRAPLEURAL; INTRATHECAL; INTRAVENOUS at 17:35

## 2020-11-13 RX ADMIN — PENICILLIN G POTASSIUM 100 MILLION UNIT(S): 5000000 POWDER, FOR SOLUTION INTRAMUSCULAR; INTRAPLEURAL; INTRATHECAL; INTRAVENOUS at 10:00

## 2020-11-13 NOTE — PROGRESS NOTE ADULT - SUBJECTIVE AND OBJECTIVE BOX
Patient is a 80y old  Female who presents with a chief complaint of Altered Mental Status (13 Nov 2020 15:51)    INTERVAL HPI/OVERNIGHT EVENTS:  Pt was seen and examined, no acute events.    MEDICATIONS  (STANDING):  aspirin  chewable 81 milliGRAM(s) Oral daily  atorvastatin 40 milliGRAM(s) Oral at bedtime  carvedilol 3.125 milliGRAM(s) Oral every 12 hours  donepezil 5 milliGRAM(s) Oral at bedtime  furosemide    Tablet 40 milliGRAM(s) Oral daily  heparin   Injectable 5000 Unit(s) SubCutaneous every 12 hours  lidocaine 1% Injectable 10 milliLiter(s) Local Injection once  lisinopril 5 milliGRAM(s) Oral daily  melatonin 3 milliGRAM(s) Oral at bedtime  memantine 10 milliGRAM(s) Oral two times a day  nicotine -   7 mG/24Hr(s) Patch 1 patch Transdermal daily  penicillin   G  potassium  IVPB      penicillin   G  potassium  IVPB 3 Million Unit(s) IV Intermittent every 4 hours    MEDICATIONS  (PRN):      Allergies  No Known Allergies      Vital Signs Last 24 Hrs  T(C): 36.8 (13 Nov 2020 16:50), Max: 37 (13 Nov 2020 04:59)  T(F): 98.2 (13 Nov 2020 16:50), Max: 98.6 (13 Nov 2020 04:59)  HR: 66 (13 Nov 2020 16:50) (60 - 69)  BP: 110/68 (13 Nov 2020 16:50) (110/68 - 143/75)  BP(mean): --  RR: 18 (13 Nov 2020 16:50) (17 - 18)  SpO2: 98% (13 Nov 2020 16:50) (95% - 98%)      PHYSICAL EXAM:  GENERAL: NAD  HEAD:  Atraumatic   EYES: PERRLA  NERVOUS SYSTEM:  Awake, alert  CHEST/LUNG: Clear  HEART: RRR  ABDOMEN: Soft, non tender  EXTREMITIES:  no edema      LABS:                        10.3   6.49  )-----------( 213      ( 13 Nov 2020 07:55 )             32.5     11-13    140  |  103  |  20  ----------------------------<  102<H>  3.7   |  32<H>  |  1.06    Ca    9.0      13 Nov 2020 07:55    TPro  6.6  /  Alb  3.4  /  TBili  0.9  /  DBili  x   /  AST  22  /  ALT  27  /  AlkPhos  57  11-13        CAPILLARY BLOOD GLUCOSE          Culture - Blood (collected 11 Nov 2020 15:00)  Source: .Blood Blood  Preliminary Report (12 Nov 2020 16:02):    No growth to date.      RADIOLOGY & ADDITIONAL TESTS:    Imaging Personally Reviewed:  [ ] YES  [ ] NO    Consultant(s) Notes Reviewed:  [ ] YES  [ ] NO    Care Discussed with Consultants/Other Providers [ ] YES  [ ] NO

## 2020-11-13 NOTE — PROGRESS NOTE ADULT - ASSESSMENT
Subjective Complaints:  Historian:             Vital Signs Last 24 Hrs  T(C): 36.6 (13 Nov 2020 11:03), Max: 37 (13 Nov 2020 04:59)  T(F): 97.8 (13 Nov 2020 11:03), Max: 98.6 (13 Nov 2020 04:59)  HR: 60 (13 Nov 2020 15:12) (60 - 69)  BP: 120/78 (13 Nov 2020 15:12) (120/74 - 143/75)  BP(mean): --  RR: 18 (13 Nov 2020 15:12) (17 - 18)  SpO2: 98% (13 Nov 2020 15:12) (95% - 98%)    GENERAL PHYSICAL EXAM:  General:  Appears stated age, well-groomed, well-nourished, no distress  HEENT:  NC/AT, patent nares w/ pink mucosa, OP clear w/o lesions, PERRL, EOMI, conjunctivae clear, no thyromegaly, nodules, adenopathy, no JVD  Chest:  Full & symmetric excursion, no increased effort, breath sounds clear  Cardiovascular:  Regular rhythm, S1, S2, no murmur/rub/S3/S4, no carotid/femoral/abdominal bruit, radial/pedal pulses 2+, no edema  Abdomen:  Soft, non-tender, non-distended, normoactive bowel sounds, no HSM  Extremities:  Gait & station:   Digits:   Nails:   Joints, Bones, Muscles:   ROM:   Stability:  Skin:  No rash/erythema/ecchymoses/petechiae/wounds/abscess/warm/dry  Musculoskeletal:  Full ROM in all joints w/o swelling/tenderness/effusion        LABS:                        10.3   6.49  )-----------( 213      ( 13 Nov 2020 07:55 )             32.5     11-13    140  |  103  |  20  ----------------------------<  102<H>  3.7   |  32<H>  |  1.06    Ca    9.0      13 Nov 2020 07:55    TPro  6.6  /  Alb  3.4  /  TBili  0.9  /  DBili  x   /  AST  22  /  ALT  27  /  AlkPhos  57  11-13          RADIOLOGY & ADDITIONAL STUDIES:        Neurology Progress Note:      Mental Status: awaek alert  speech fluent follows commands       Cranial Nerves: 2 1/2 intact      Motor:   arm l,eg 4/5         Sensory: intact      Cerebellar: defrd      Gait: unsteady       Assesment/Plan: cva dementia  vdrl positive on pcn for spinaL TAP CALLED FAMILY FOR CONSENT LEFT  MESSAGE

## 2020-11-13 NOTE — PROGRESS NOTE ADULT - ASSESSMENT
80 years old female with history of dementia and Hypertension and diabetes was found with wandering with cerebrovascular accident seen and lbbb with elevated trop     Acute Systolic Heart Failure/ NSTEMI:  - EF < 20 %  - S/P heparin gtt   - Trop trending down  - Continue ASA, plavix stopped  - Continue statin   - Cardiology eval appreciated  - Stress test cancelled for acute CVA, outpt follow up    Anemia:  - Microcytic anemia  - Neg occult blood  - Hb stable  - Iron studies not consistent with iron deficient     Acute/subacute CVA:  - MR brain noted  - CTA head and neck unremarkable  - Neurology eval appreciated   - On ASA, Statin , Plavix stopped    Tertiary syphilis:  - Dr. Olivas discussed with ID at [facility] pt was treated for latent TB in 2018  - Started on Penicillin  - LP per neuro, Unable to obtain consent , waiting for call back from son   - ID following    Essential hypertension:  - Continue home meds.    DVT ppx    Pt was on Kaletra on admission  - As per son no h/o HIV, called PCP Dr. Ortega Castillo [phone]. left msg with staff, awaiting call back.  - Meds stopped  - HIV neg here, Viral load undetectable  - Called pharmacy, pt was prescribed 2 doses of Kaletra for COVID    Disposition: Awaiting son to make decision.

## 2020-11-13 NOTE — PROGRESS NOTE ADULT - SUBJECTIVE AND OBJECTIVE BOX
HPI:   81yo female with unknown pmh [place]. Pt gave name and . Pt is AOX2, shivering but otherwise deneis any complaints. Police currently attempting to look for family.    	No fever/chills, No photophobia/eye pain/changes in vision, No ear pain/sore throat/dysphagia, No chest pain/palpitations, no SOB/cough, no wheeze/stridor, No abdominal pain, No N/V/D, no dysuria/frequency/discharge, No neck/back pain, no rash, no changes in neurological status/function.   (2020 14:02)      Allergies    No Known Allergies    Intolerances        MEDICATIONS  (STANDING):  aspirin  chewable 81 milliGRAM(s) Oral daily  atorvastatin 40 milliGRAM(s) Oral at bedtime  carvedilol 3.125 milliGRAM(s) Oral every 12 hours  donepezil 5 milliGRAM(s) Oral at bedtime  furosemide    Tablet 40 milliGRAM(s) Oral daily  heparin   Injectable 5000 Unit(s) SubCutaneous every 12 hours  lisinopril 5 milliGRAM(s) Oral daily  melatonin 3 milliGRAM(s) Oral at bedtime  memantine 10 milliGRAM(s) Oral two times a day  nicotine -   7 mG/24Hr(s) Patch 1 patch Transdermal daily  penicillin   G  potassium  IVPB      penicillin   G  potassium  IVPB 3 Million Unit(s) IV Intermittent every 4 hours    MEDICATIONS  (PRN):      REVIEW OF SYSTEMS:    Alert & demented , cannot obtain reliable ho     VITAL SIGNS:  T(C): 36.6 (20 @ 11:03), Max: 37 (20 @ 04:59)  T(F): 97.8 (20 @ 11:03), Max: 98.6 (20 @ 04:59)  HR: 62 (20 @ 11:03) (62 - 69)  BP: 139/79 (20 @ 11:03) (120/74 - 143/75)  RR: 17 (20 @ 11:03) (15 - 17)  SpO2: 95% (20 @ 11:03) (95% - 97%)  Wt(kg): --    PHYSICAL EXAM:    GENERAL: not in any distress  HEENT: Neck is supple, normocephalic, atraumatic   CHEST/LUNG: Clear to percussion bilaterally; No rales, rhonchi, wheezing  HEART: Regular rate and rhythm; No murmurs, rubs, or gallops  ABDOMEN: Soft, Nontender, Nondistended; Bowel sounds present, no rebound   EXTREMITIES:  2+ Peripheral Pulses, No clubbing, cyanosis, or edema  GENITOURINARY:   SKIN: No rashes or lesions  BACK: no pressor sore   NERVOUS SYSTEM:  Alert & demented     LABS:                         10.3   6.49  )-----------( 213      ( 2020 07:55 )             32.5         140  |  103  |  20  ----------------------------<  102<H>  3.7   |  32<H>  |  1.06    Ca    9.0      2020 07:55    TPro  6.6  /  Alb  3.4  /  TBili  0.9  /  DBili  x   /  AST  22  /  ALT  27  /  AlkPhos  57      LIVER FUNCTIONS - ( 2020 07:55 )  Alb: 3.4 g/dL / Pro: 6.6 gm/dL / ALK PHOS: 57 U/L / ALT: 27 U/L / AST: 22 U/L / GGT: x                     Sedimentation Rate, Erythrocyte: 9 mm/hr ( @ 12:41)            Culture Results:   No growth to date. ( @ 15:00)                Radiology:

## 2020-11-13 NOTE — PROGRESS NOTE ADULT - ASSESSMENT
79 yo female with unknown pmh [place], AMS   Acute multiple infarcts Stroke r/o embolic ? r/o  neurosyphilis less likely     Brain CT without contrast: acute/subacute infarcts in the bilateral occipital lobes and right cerebellum. An acute right frontal infarct is better seen on MRI. No evidence of hemorrhagic transformation.  CT angiography - No evidence of vascular dissection.No large vessel occlusion. No evidence of aneurysm.    patient reported she has never been diagnosed with syphilis , no treatment ho, HIV or HCV/ HBV, not taking any meds? , however patient is somewhat confused and cannot give reliable history   hiv vl not detected   HIV ag/ab negative x2   RPR reactive 1:2 , i discussed with neurology Dr Berger on phone, will need to rule out tertiary neurosyphilis and neurology will try for LP   as per [facility] , patient is following with [facility], she known to have dementia, no ho of hiv, recently diagnosed with covid , s/p treated for latent syphilis with 3 shots for 3 consecutive weeks in 2018   i discussed again with neurology , will go for LP as per neuro, please send CSF VDRL and CSF FTA   case discussed with Dr Berger and Dr Nance

## 2020-11-14 LAB
ALBUMIN SERPL ELPH-MCNC: 3.3 G/DL — SIGNIFICANT CHANGE UP (ref 3.3–5)
ALP SERPL-CCNC: 56 U/L — SIGNIFICANT CHANGE UP (ref 40–120)
ALT FLD-CCNC: 24 U/L — SIGNIFICANT CHANGE UP (ref 12–78)
ANION GAP SERPL CALC-SCNC: 6 MMOL/L — SIGNIFICANT CHANGE UP (ref 5–17)
AST SERPL-CCNC: 19 U/L — SIGNIFICANT CHANGE UP (ref 15–37)
BILIRUB SERPL-MCNC: 0.9 MG/DL — SIGNIFICANT CHANGE UP (ref 0.2–1.2)
BUN SERPL-MCNC: 20 MG/DL — SIGNIFICANT CHANGE UP (ref 7–23)
CALCIUM SERPL-MCNC: 8.9 MG/DL — SIGNIFICANT CHANGE UP (ref 8.5–10.1)
CHLORIDE SERPL-SCNC: 104 MMOL/L — SIGNIFICANT CHANGE UP (ref 96–108)
CO2 SERPL-SCNC: 30 MMOL/L — SIGNIFICANT CHANGE UP (ref 22–31)
CREAT SERPL-MCNC: 0.94 MG/DL — SIGNIFICANT CHANGE UP (ref 0.5–1.3)
GLUCOSE CSF-MCNC: 68 MG/DL — SIGNIFICANT CHANGE UP (ref 40–70)
GLUCOSE SERPL-MCNC: 107 MG/DL — HIGH (ref 70–99)
HCT VFR BLD CALC: 33.8 % — LOW (ref 34.5–45)
HGB BLD-MCNC: 10.4 G/DL — LOW (ref 11.5–15.5)
MCHC RBC-ENTMCNC: 24 PG — LOW (ref 27–34)
MCHC RBC-ENTMCNC: 30.8 GM/DL — LOW (ref 32–36)
MCV RBC AUTO: 78.1 FL — LOW (ref 80–100)
NRBC # BLD: 0 /100 WBCS — SIGNIFICANT CHANGE UP (ref 0–0)
PLATELET # BLD AUTO: 230 K/UL — SIGNIFICANT CHANGE UP (ref 150–400)
POTASSIUM SERPL-MCNC: 3.9 MMOL/L — SIGNIFICANT CHANGE UP (ref 3.5–5.3)
POTASSIUM SERPL-SCNC: 3.9 MMOL/L — SIGNIFICANT CHANGE UP (ref 3.5–5.3)
PROT CSF-MCNC: 52 MG/DL — HIGH (ref 15–45)
PROT SERPL-MCNC: 6.7 GM/DL — SIGNIFICANT CHANGE UP (ref 6–8.3)
RBC # BLD: 4.33 M/UL — SIGNIFICANT CHANGE UP (ref 3.8–5.2)
RBC # FLD: 13.9 % — SIGNIFICANT CHANGE UP (ref 10.3–14.5)
SODIUM SERPL-SCNC: 140 MMOL/L — SIGNIFICANT CHANGE UP (ref 135–145)
WBC # BLD: 6.65 K/UL — SIGNIFICANT CHANGE UP (ref 3.8–10.5)
WBC # FLD AUTO: 6.65 K/UL — SIGNIFICANT CHANGE UP (ref 3.8–10.5)

## 2020-11-14 PROCEDURE — 99232 SBSQ HOSP IP/OBS MODERATE 35: CPT

## 2020-11-14 RX ADMIN — MEMANTINE HYDROCHLORIDE 10 MILLIGRAM(S): 10 TABLET ORAL at 05:14

## 2020-11-14 RX ADMIN — Medication 10 MILLILITER(S): at 18:41

## 2020-11-14 RX ADMIN — Medication 40 MILLIGRAM(S): at 05:15

## 2020-11-14 RX ADMIN — DONEPEZIL HYDROCHLORIDE 5 MILLIGRAM(S): 10 TABLET, FILM COATED ORAL at 21:46

## 2020-11-14 RX ADMIN — PENICILLIN G POTASSIUM 100 MILLION UNIT(S): 5000000 POWDER, FOR SOLUTION INTRAMUSCULAR; INTRAPLEURAL; INTRATHECAL; INTRAVENOUS at 05:20

## 2020-11-14 RX ADMIN — PENICILLIN G POTASSIUM 100 MILLION UNIT(S): 5000000 POWDER, FOR SOLUTION INTRAMUSCULAR; INTRAPLEURAL; INTRATHECAL; INTRAVENOUS at 21:46

## 2020-11-14 RX ADMIN — HEPARIN SODIUM 5000 UNIT(S): 5000 INJECTION INTRAVENOUS; SUBCUTANEOUS at 05:14

## 2020-11-14 RX ADMIN — PENICILLIN G POTASSIUM 100 MILLION UNIT(S): 5000000 POWDER, FOR SOLUTION INTRAMUSCULAR; INTRAPLEURAL; INTRATHECAL; INTRAVENOUS at 12:24

## 2020-11-14 RX ADMIN — MEMANTINE HYDROCHLORIDE 10 MILLIGRAM(S): 10 TABLET ORAL at 17:30

## 2020-11-14 RX ADMIN — LISINOPRIL 5 MILLIGRAM(S): 2.5 TABLET ORAL at 05:14

## 2020-11-14 RX ADMIN — HEPARIN SODIUM 5000 UNIT(S): 5000 INJECTION INTRAVENOUS; SUBCUTANEOUS at 17:30

## 2020-11-14 RX ADMIN — Medication 1 PATCH: at 15:37

## 2020-11-14 RX ADMIN — Medication 1 PATCH: at 12:24

## 2020-11-14 RX ADMIN — Medication 81 MILLIGRAM(S): at 12:24

## 2020-11-14 RX ADMIN — ATORVASTATIN CALCIUM 40 MILLIGRAM(S): 80 TABLET, FILM COATED ORAL at 21:46

## 2020-11-14 RX ADMIN — PENICILLIN G POTASSIUM 100 MILLION UNIT(S): 5000000 POWDER, FOR SOLUTION INTRAMUSCULAR; INTRAPLEURAL; INTRATHECAL; INTRAVENOUS at 02:15

## 2020-11-14 RX ADMIN — Medication 1 PATCH: at 12:25

## 2020-11-14 RX ADMIN — Medication 3 MILLIGRAM(S): at 21:46

## 2020-11-14 RX ADMIN — Medication 1 PATCH: at 19:31

## 2020-11-14 RX ADMIN — PENICILLIN G POTASSIUM 100 MILLION UNIT(S): 5000000 POWDER, FOR SOLUTION INTRAMUSCULAR; INTRAPLEURAL; INTRATHECAL; INTRAVENOUS at 15:29

## 2020-11-14 RX ADMIN — PENICILLIN G POTASSIUM 100 MILLION UNIT(S): 5000000 POWDER, FOR SOLUTION INTRAMUSCULAR; INTRAPLEURAL; INTRATHECAL; INTRAVENOUS at 17:32

## 2020-11-14 RX ADMIN — CARVEDILOL PHOSPHATE 3.12 MILLIGRAM(S): 80 CAPSULE, EXTENDED RELEASE ORAL at 05:14

## 2020-11-14 NOTE — PROGRESS NOTE ADULT - SUBJECTIVE AND OBJECTIVE BOX
Patient is a 80y old  Female who presents with a chief complaint of Altered Mental Status (13 Nov 2020 19:31)    INTERVAL HPI/OVERNIGHT EVENTS:  Pt was seen and examined, no acute events.      MEDICATIONS  (STANDING):  aspirin  chewable 81 milliGRAM(s) Oral daily  atorvastatin 40 milliGRAM(s) Oral at bedtime  carvedilol 3.125 milliGRAM(s) Oral every 12 hours  donepezil 5 milliGRAM(s) Oral at bedtime  furosemide    Tablet 40 milliGRAM(s) Oral daily  heparin   Injectable 5000 Unit(s) SubCutaneous every 12 hours  lidocaine 1% Injectable 10 milliLiter(s) Local Injection once  lisinopril 5 milliGRAM(s) Oral daily  melatonin 3 milliGRAM(s) Oral at bedtime  memantine 10 milliGRAM(s) Oral two times a day  nicotine -   7 mG/24Hr(s) Patch 1 patch Transdermal daily  penicillin   G  potassium  IVPB      penicillin   G  potassium  IVPB 3 Million Unit(s) IV Intermittent every 4 hours    MEDICATIONS  (PRN):      Allergies  No Known Allergies      Vital Signs Last 24 Hrs  T(C): 36.8 (14 Nov 2020 10:37), Max: 36.8 (13 Nov 2020 16:50)  T(F): 98.3 (14 Nov 2020 10:37), Max: 98.3 (14 Nov 2020 10:37)  HR: 65 (14 Nov 2020 10:37) (63 - 78)  BP: 93/59 (14 Nov 2020 10:37) (93/59 - 132/76)  BP(mean): 70 (14 Nov 2020 10:37) (70 - 70)  RR: 17 (14 Nov 2020 10:37) (17 - 18)  SpO2: 94% (14 Nov 2020 10:37) (94% - 98%)      PHYSICAL EXAM:  GENERAL: NAD  HEAD:  Atraumatic  EYES: PERRLA  NERVOUS SYSTEM:  Awake, alert  CHEST/LUNG: Clear  HEART: RRR  ABDOMEN: Soft, non tender  EXTREMITIES:  no edema      LABS:                        10.4   6.65  )-----------( 230      ( 14 Nov 2020 07:40 )             33.8     11-14    140  |  104  |  20  ----------------------------<  107<H>  3.9   |  30  |  0.94    Ca    8.9      14 Nov 2020 07:40    TPro  6.7  /  Alb  3.3  /  TBili  0.9  /  DBili  x   /  AST  19  /  ALT  24  /  AlkPhos  56  11-14      CAPILLARY BLOOD GLUCOSE      Culture - Blood (collected 11 Nov 2020 15:00)  Source: .Blood Blood  Preliminary Report (12 Nov 2020 16:02):    No growth to date.      RADIOLOGY & ADDITIONAL TESTS:    Imaging Personally Reviewed:  [ ] YES  [ ] NO    Consultant(s) Notes Reviewed:  [ ] YES  [ ] NO    Care Discussed with Consultants/Other Providers [ ] YES  [ ] NO

## 2020-11-14 NOTE — DISCHARGE NOTE NURSING/CASE MANAGEMENT/SOCIAL WORK - PATIENT PORTAL LINK FT
You can access the FollowMyHealth Patient Portal offered by [facility] by registering at the following website: [email or web address]. By joining [facility] FollowMyHealth portal, you will also be able to view your health information using other applications (apps) compatible with our system.

## 2020-11-14 NOTE — PROGRESS NOTE ADULT - ASSESSMENT
80 years old female with history of dementia and Hypertension and diabetes was found with wandering with cerebrovascular accident seen and lbbb with elevated trop     Acute Systolic Heart Failure/ NSTEMI:  - EF < 20 %  - S/P heparin gtt   - Trop trending down  - Continue ASA, plavix stopped  - Continue statin   - Cardiology eval appreciated  - Stress test cancelled for acute CVA, outpt follow up    Anemia:  - Microcytic anemia  - Neg occult blood  - Iron studies not consistent with iron deficient   - Hb stable, outpt follow up and work up.    Acute/subacute CVA:  - MR brain noted  - CTA head and neck unremarkable  - Neurology eval appreciated   - On ASA, Statin , Plavix stopped    Tertiary syphilis:  - Dr. Olivas discussed with ID at [facility] pt was treated for latent TB in 2018  - Started on Penicillin  - LP per neuro, Unable to obtain consent , waiting for call back from son   - ID following    Essential hypertension:  - Continue home meds.    DVT ppx    Pt was on Kaletra on admission  - As per son no h/o HIV, called PCP Dr. Ortega Castillo [phone]. left msg with staff, awaiting call back.  - Meds stopped  - HIV neg here, Viral load undetectable  - Called pharmacy, pt was prescribed 2 doses of Kaletra for COVID    Disposition: Awaiting son to make decision. PT recc home PT, but lives alone, might need placement.

## 2020-11-14 NOTE — PROGRESS NOTE ADULT - ASSESSMENT
Subjective Complaints:  Historian:             Vital Signs Last 24 Hrs  T(C): 37.7 (14 Nov 2020 16:42), Max: 37.7 (14 Nov 2020 16:42)  T(F): 99.9 (14 Nov 2020 16:42), Max: 99.9 (14 Nov 2020 16:42)  HR: 59 (14 Nov 2020 16:42) (59 - 78)  BP: 100/66 (14 Nov 2020 16:42) (93/59 - 132/76)  BP(mean): 77 (14 Nov 2020 16:42) (70 - 77)  RR: 17 (14 Nov 2020 16:42) (17 - 18)  SpO2: 98% (14 Nov 2020 16:42) (94% - 98%)    GENERAL PHYSICAL EXAM:  General:  Appears stated age, well-groomed, well-nourished, no distress  HEENT:  NC/AT, patent nares w/ pink mucosa, OP clear w/o lesions, PERRL, EOMI, conjunctivae clear, no thyromegaly, nodules, adenopathy, no JVD  Chest:  Full & symmetric excursion, no increased effort, breath sounds clear  Cardiovascular:  Regular rhythm, S1, S2, no murmur/rub/S3/S4, no carotid/femoral/abdominal bruit, radial/pedal pulses 2+, no edema  Abdomen:  Soft, non-tender, non-distended, normoactive bowel sounds, no HSM  Extremities:  Gait & station:   Digits:   Nails:   Joints, Bones, Muscles:   ROM:   Stability:  Skin:  No rash/erythema/ecchymoses/petechiae/wounds/abscess/warm/dry  Musculoskeletal:  Full ROM in all joints w/o swelling/tenderness/effusion        LABS:                        10.4   6.65  )-----------( 230      ( 14 Nov 2020 07:40 )             33.8     11-14    140  |  104  |  20  ----------------------------<  107<H>  3.9   |  30  |  0.94    Ca    8.9      14 Nov 2020 07:40    TPro  6.7  /  Alb  3.3  /  TBili  0.9  /  DBili  x   /  AST  19  /  ALT  24  /  AlkPhos  56  11-14          RADIOLOGY & ADDITIONAL STUDIES:        Neurology Progress Note:      Mental Status: awaek laert speech fluent confused       Cranial Nerves: 2 1/2 intact      Motor:   arm leg 4/5         Sensory: intact      Cerebellar:  defrd      Gait: unsteady       Assesment/Plan: cva confusion  vdrl positive s/p spinal tap id follow up discuss with son will follow

## 2020-11-15 LAB
ALBUMIN SERPL ELPH-MCNC: 3.4 G/DL — SIGNIFICANT CHANGE UP (ref 3.3–5)
ALP SERPL-CCNC: 57 U/L — SIGNIFICANT CHANGE UP (ref 40–120)
ALT FLD-CCNC: 19 U/L — SIGNIFICANT CHANGE UP (ref 12–78)
ANION GAP SERPL CALC-SCNC: 5 MMOL/L — SIGNIFICANT CHANGE UP (ref 5–17)
AST SERPL-CCNC: 16 U/L — SIGNIFICANT CHANGE UP (ref 15–37)
BILIRUB SERPL-MCNC: 0.9 MG/DL — SIGNIFICANT CHANGE UP (ref 0.2–1.2)
BUN SERPL-MCNC: 24 MG/DL — HIGH (ref 7–23)
CALCIUM SERPL-MCNC: 9.3 MG/DL — SIGNIFICANT CHANGE UP (ref 8.5–10.1)
CHLORIDE SERPL-SCNC: 101 MMOL/L — SIGNIFICANT CHANGE UP (ref 96–108)
CO2 SERPL-SCNC: 32 MMOL/L — HIGH (ref 22–31)
CREAT SERPL-MCNC: 0.95 MG/DL — SIGNIFICANT CHANGE UP (ref 0.5–1.3)
CRYPTOC AG CSF-ACNC: NEGATIVE — SIGNIFICANT CHANGE UP
GLUCOSE SERPL-MCNC: 91 MG/DL — SIGNIFICANT CHANGE UP (ref 70–99)
GRAM STN FLD: SIGNIFICANT CHANGE UP
HCT VFR BLD CALC: 35.7 % — SIGNIFICANT CHANGE UP (ref 34.5–45)
HGB BLD-MCNC: 10.8 G/DL — LOW (ref 11.5–15.5)
MCHC RBC-ENTMCNC: 24.1 PG — LOW (ref 27–34)
MCHC RBC-ENTMCNC: 30.3 GM/DL — LOW (ref 32–36)
MCV RBC AUTO: 79.7 FL — LOW (ref 80–100)
NRBC # BLD: 0 /100 WBCS — SIGNIFICANT CHANGE UP (ref 0–0)
PLATELET # BLD AUTO: 242 K/UL — SIGNIFICANT CHANGE UP (ref 150–400)
POTASSIUM SERPL-MCNC: 3.9 MMOL/L — SIGNIFICANT CHANGE UP (ref 3.5–5.3)
POTASSIUM SERPL-SCNC: 3.9 MMOL/L — SIGNIFICANT CHANGE UP (ref 3.5–5.3)
PROT SERPL-MCNC: 7.2 GM/DL — SIGNIFICANT CHANGE UP (ref 6–8.3)
RBC # BLD: 4.48 M/UL — SIGNIFICANT CHANGE UP (ref 3.8–5.2)
RBC # FLD: 14.2 % — SIGNIFICANT CHANGE UP (ref 10.3–14.5)
SODIUM SERPL-SCNC: 138 MMOL/L — SIGNIFICANT CHANGE UP (ref 135–145)
SPECIMEN SOURCE: SIGNIFICANT CHANGE UP
WBC # BLD: 7.55 K/UL — SIGNIFICANT CHANGE UP (ref 3.8–10.5)
WBC # FLD AUTO: 7.55 K/UL — SIGNIFICANT CHANGE UP (ref 3.8–10.5)

## 2020-11-15 PROCEDURE — 99233 SBSQ HOSP IP/OBS HIGH 50: CPT

## 2020-11-15 RX ORDER — ACETAMINOPHEN 500 MG
650 TABLET ORAL ONCE
Refills: 0 | Status: COMPLETED | OUTPATIENT
Start: 2020-11-15 | End: 2020-11-15

## 2020-11-15 RX ORDER — ACETAMINOPHEN 500 MG
650 TABLET ORAL ONCE
Refills: 0 | Status: DISCONTINUED | OUTPATIENT
Start: 2020-11-15 | End: 2020-11-15

## 2020-11-15 RX ADMIN — Medication 81 MILLIGRAM(S): at 11:50

## 2020-11-15 RX ADMIN — Medication 1 PATCH: at 11:50

## 2020-11-15 RX ADMIN — LISINOPRIL 5 MILLIGRAM(S): 2.5 TABLET ORAL at 05:49

## 2020-11-15 RX ADMIN — PENICILLIN G POTASSIUM 100 MILLION UNIT(S): 5000000 POWDER, FOR SOLUTION INTRAMUSCULAR; INTRAPLEURAL; INTRATHECAL; INTRAVENOUS at 21:01

## 2020-11-15 RX ADMIN — Medication 1 PATCH: at 19:15

## 2020-11-15 RX ADMIN — Medication 40 MILLIGRAM(S): at 05:49

## 2020-11-15 RX ADMIN — Medication 650 MILLIGRAM(S): at 21:50

## 2020-11-15 RX ADMIN — PENICILLIN G POTASSIUM 100 MILLION UNIT(S): 5000000 POWDER, FOR SOLUTION INTRAMUSCULAR; INTRAPLEURAL; INTRATHECAL; INTRAVENOUS at 14:31

## 2020-11-15 RX ADMIN — Medication 1 PATCH: at 11:49

## 2020-11-15 RX ADMIN — HEPARIN SODIUM 5000 UNIT(S): 5000 INJECTION INTRAVENOUS; SUBCUTANEOUS at 05:49

## 2020-11-15 RX ADMIN — PENICILLIN G POTASSIUM 100 MILLION UNIT(S): 5000000 POWDER, FOR SOLUTION INTRAMUSCULAR; INTRAPLEURAL; INTRATHECAL; INTRAVENOUS at 10:16

## 2020-11-15 RX ADMIN — Medication 3 MILLIGRAM(S): at 21:02

## 2020-11-15 RX ADMIN — MEMANTINE HYDROCHLORIDE 10 MILLIGRAM(S): 10 TABLET ORAL at 05:49

## 2020-11-15 RX ADMIN — CARVEDILOL PHOSPHATE 3.12 MILLIGRAM(S): 80 CAPSULE, EXTENDED RELEASE ORAL at 17:06

## 2020-11-15 RX ADMIN — DONEPEZIL HYDROCHLORIDE 5 MILLIGRAM(S): 10 TABLET, FILM COATED ORAL at 21:02

## 2020-11-15 RX ADMIN — ATORVASTATIN CALCIUM 40 MILLIGRAM(S): 80 TABLET, FILM COATED ORAL at 21:02

## 2020-11-15 RX ADMIN — CARVEDILOL PHOSPHATE 3.12 MILLIGRAM(S): 80 CAPSULE, EXTENDED RELEASE ORAL at 05:49

## 2020-11-15 RX ADMIN — MEMANTINE HYDROCHLORIDE 10 MILLIGRAM(S): 10 TABLET ORAL at 17:06

## 2020-11-15 RX ADMIN — Medication 1 PATCH: at 07:04

## 2020-11-15 RX ADMIN — Medication 650 MILLIGRAM(S): at 21:02

## 2020-11-15 RX ADMIN — PENICILLIN G POTASSIUM 100 MILLION UNIT(S): 5000000 POWDER, FOR SOLUTION INTRAMUSCULAR; INTRAPLEURAL; INTRATHECAL; INTRAVENOUS at 05:54

## 2020-11-15 RX ADMIN — PENICILLIN G POTASSIUM 100 MILLION UNIT(S): 5000000 POWDER, FOR SOLUTION INTRAMUSCULAR; INTRAPLEURAL; INTRATHECAL; INTRAVENOUS at 17:06

## 2020-11-15 RX ADMIN — HEPARIN SODIUM 5000 UNIT(S): 5000 INJECTION INTRAVENOUS; SUBCUTANEOUS at 17:06

## 2020-11-15 RX ADMIN — PENICILLIN G POTASSIUM 100 MILLION UNIT(S): 5000000 POWDER, FOR SOLUTION INTRAMUSCULAR; INTRAPLEURAL; INTRATHECAL; INTRAVENOUS at 02:10

## 2020-11-15 NOTE — PROGRESS NOTE ADULT - ASSESSMENT
confusion   ct with acute multiple strokes embolic?  s/p spinal tap  csf culture neg  awaiting cell count   positive RPR & FTA   neuro on the case  check covid ab   r/o post covid stroke   echo no vegetations but dilated cardiomyopathy   blood culture neg   on empiric tx for neurosyphilis

## 2020-11-15 NOTE — PROGRESS NOTE ADULT - ASSESSMENT
Subjective Complaints:  Historian:             Vital Signs Last 24 Hrs  T(C): 36.3 (15 Nov 2020 17:09), Max: 36.8 (15 Nov 2020 00:22)  T(F): 97.4 (15 Nov 2020 17:09), Max: 98.2 (15 Nov 2020 00:22)  HR: 68 (15 Nov 2020 17:09) (57 - 73)  BP: 116/71 (15 Nov 2020 17:09) (110/70 - 123/54)  BP(mean): 77 (15 Nov 2020 10:52) (77 - 77)  RR: 16 (15 Nov 2020 17:09) (16 - 17)  SpO2: 97% (15 Nov 2020 17:09) (94% - 99%)    GENERAL PHYSICAL EXAM:  General:  Appears stated age, well-groomed, well-nourished, no distress  HEENT:  NC/AT, patent nares w/ pink mucosa, OP clear w/o lesions, PERRL, EOMI, conjunctivae clear, no thyromegaly, nodules, adenopathy, no JVD  Chest:  Full & symmetric excursion, no increased effort, breath sounds clear  Cardiovascular:  Regular rhythm, S1, S2, no murmur/rub/S3/S4, no carotid/femoral/abdominal bruit, radial/pedal pulses 2+, no edema  Abdomen:  Soft, non-tender, non-distended, normoactive bowel sounds, no HSM  Extremities:  Gait & station:   Digits:   Nails:   Joints, Bones, Muscles:   ROM:   Stability:  Skin:  No rash/erythema/ecchymoses/petechiae/wounds/abscess/warm/dry  Musculoskeletal:  Full ROM in all joints w/o swelling/tenderness/effusion        LABS:                        10.8   7.55  )-----------( 242      ( 15 Nov 2020 08:07 )             35.7     11-15    138  |  101  |  24<H>  ----------------------------<  91  3.9   |  32<H>  |  0.95    Ca    9.3      15 Nov 2020 08:07    TPro  7.2  /  Alb  3.4  /  TBili  0.9  /  DBili  x   /  AST  16  /  ALT  19  /  AlkPhos  57  11-15          RADIOLOGY & ADDITIONAL STUDIES:        Neurology Progress Note:      Mental Status: awaek alert speech fluent confused       Cranial Nerves: 2 1/2 intact      Motor:   arm leg 4.5         Sensory: intact      Cerebellar:  defrd      Gait: unsteady       Assesment/Plan: s/p spinal tap csf vdrl pending id folow up cva  on asa for rehab no seziure

## 2020-11-15 NOTE — PROGRESS NOTE ADULT - ASSESSMENT
80 years old female with history of dementia and Hypertension and diabetes was found with wandering with cerebrovascular accident seen and lbbb with elevated trop     Acute Systolic Heart Failure/ NSTEMI:  - Echo with EF < 20 %, dilated cardiomyopathy   - S/P heparin gtt   - Trop trending down  - Continue ASA, plavix stopped  - Continue statin   - Cardiology eval appreciated  - Stress test cancelled for acute CVA, outpt follow up    Anemia:  - Microcytic anemia  - Neg occult blood  - Iron studies not consistent with iron deficient   - Hb stable, outpt follow up and work up.    Acute/subacute CVA:  - MR brain noted, possibly embolic  - CTA head and neck unremarkable  - Neurology eval appreciated   - ~ 10 day since diagnosis of CVA, will discuss with neuro and cardio regarding need of JOVAN ( safe to start AC if indicated), 2D echo with no thrombus  - On ASA, Statin , Plavix stopped    Tertiary syphilis:  - Dr. Olivas discussed with ID at [facility] pt was treated for latent TB in 2018  - Started on Penicillin  - S/P Spinal tap yesterday follow up CSF study  - ID following    Essential hypertension:  - Continue home meds.    DVT ppx    Pt was on Kaletra on admission  - Called pharmacy, pt was prescribed 2 doses of Kaletra for COVID from [facility].    Disposition: Awaiting son to make decision. PT recc home PT, but lives alone, might need placement.

## 2020-11-15 NOTE — PROGRESS NOTE ADULT - SUBJECTIVE AND OBJECTIVE BOX
Patient is a 80y old  Female who presents with a chief complaint of Altered Mental Status (15 Nov 2020 09:47)    INTERVAL HPI/OVERNIGHT EVENTS:  Pt was seen and examined, no acute events.    MEDICATIONS  (STANDING):  aspirin  chewable 81 milliGRAM(s) Oral daily  atorvastatin 40 milliGRAM(s) Oral at bedtime  carvedilol 3.125 milliGRAM(s) Oral every 12 hours  donepezil 5 milliGRAM(s) Oral at bedtime  furosemide    Tablet 40 milliGRAM(s) Oral daily  heparin   Injectable 5000 Unit(s) SubCutaneous every 12 hours  lisinopril 5 milliGRAM(s) Oral daily  melatonin 3 milliGRAM(s) Oral at bedtime  memantine 10 milliGRAM(s) Oral two times a day  nicotine -   7 mG/24Hr(s) Patch 1 patch Transdermal daily  penicillin   G  potassium  IVPB      penicillin   G  potassium  IVPB 3 Million Unit(s) IV Intermittent every 4 hours    MEDICATIONS  (PRN):      Allergies  No Known Allergies        Vital Signs Last 24 Hrs  T(C): 36.4 (15 Nov 2020 10:52), Max: 37.7 (14 Nov 2020 16:42)  T(F): 97.5 (15 Nov 2020 10:52), Max: 99.9 (14 Nov 2020 16:42)  HR: 58 (15 Nov 2020 10:52) (57 - 73)  BP: 123/54 (15 Nov 2020 10:52) (100/66 - 123/54)  BP(mean): 77 (15 Nov 2020 10:52) (77 - 77)  RR: 16 (15 Nov 2020 10:52) (16 - 17)  SpO2: 99% (15 Nov 2020 10:52) (94% - 99%)      PHYSICAL EXAM:  GENERAL: NAD  HEAD:  Atraumatic  EYES: PERRLA  NERVOUS SYSTEM:  Awake, alert  CHEST/LUNG: Clear  HEART: RRR  ABDOMEN: Soft, non tender  EXTREMITIES:  no edema      LABS:                        10.8   7.55  )-----------( 242      ( 15 Nov 2020 08:07 )             35.7     11-15    138  |  101  |  24<H>  ----------------------------<  91  3.9   |  32<H>  |  0.95    Ca    9.3      15 Nov 2020 08:07    TPro  7.2  /  Alb  3.4  /  TBili  0.9  /  DBili  x   /  AST  16  /  ALT  19  /  AlkPhos  57  11-15        CAPILLARY BLOOD GLUCOSE          Culture - CSF with Gram Stain (collected 15 Nov 2020 00:11)  Source: .CSF CSF  Gram Stain (15 Nov 2020 01:07):    No polymorphonuclear leukocytes seen    No organisms seen    by cytocentrifuge    Culture - Blood (collected 11 Nov 2020 15:00)  Source: .Blood Blood  Preliminary Report (12 Nov 2020 16:02):    No growth to date.      RADIOLOGY & ADDITIONAL TESTS:    Imaging Personally Reviewed:  [ ] YES  [ ] NO    Consultant(s) Notes Reviewed:  [ ] YES  [ ] NO    Care Discussed with Consultants/Other Providers [ ] YES  [ ] NO

## 2020-11-15 NOTE — PROGRESS NOTE ADULT - SUBJECTIVE AND OBJECTIVE BOX
HPI:   81yo female admitted with confusion wondering the streets  ct positive for multiple strokes      Allergies    No Known Allergies    Intolerances        MEDICATIONS  (STANDING):  aspirin  chewable 81 milliGRAM(s) Oral daily  atorvastatin 40 milliGRAM(s) Oral at bedtime  carvedilol 3.125 milliGRAM(s) Oral every 12 hours  donepezil 5 milliGRAM(s) Oral at bedtime  furosemide    Tablet 40 milliGRAM(s) Oral daily  heparin   Injectable 5000 Unit(s) SubCutaneous every 12 hours  lisinopril 5 milliGRAM(s) Oral daily  melatonin 3 milliGRAM(s) Oral at bedtime  memantine 10 milliGRAM(s) Oral two times a day  nicotine -   7 mG/24Hr(s) Patch 1 patch Transdermal daily  penicillin   G  potassium  IVPB      penicillin   G  potassium  IVPB 3 Million Unit(s) IV Intermittent every 4 hours    MEDICATIONS  (PRN):      REVIEW OF SYSTEMS:    unable to obtain due to confusion      VITAL SIGNS:  T(C): 36.3 (11-15-20 @ 05:48), Max: 37.7 (11-14-20 @ 16:42)  T(F): 97.4 (11-15-20 @ 05:48), Max: 99.9 (11-14-20 @ 16:42)  HR: 57 (11-15-20 @ 05:48) (57 - 73)  BP: 113/72 (11-15-20 @ 05:48) (93/59 - 113/72)  RR: 17 (11-15-20 @ 05:48) (17 - 17)  SpO2: 97% (11-15-20 @ 05:48) (94% - 98%)  Wt(kg): --    PHYSICAL EXAM:    GENERAL: not in any distress  HEENT: Neck is supple, normocephalic, atraumatic   CHEST/LUNG: Clear to percussion bilaterally; No rales, rhonchi, wheezing  HEART: Regular rate and rhythm; No murmurs, rubs, or gallops  ABDOMEN: Soft, Nontender, Nondistended; Bowel sounds present, no rebound   EXTREMITIES:  2+ Peripheral Pulses, No clubbing, cyanosis, or edema  GENITOURINARY:   SKIN: No rashes or lesions  BACK: no pressor sore   NERVOUS SYSTEM: confused    LABS:                         10.8   7.55  )-----------( 242      ( 15 Nov 2020 08:07 )             35.7     11-15    138  |  101  |  24<H>  ----------------------------<  91  3.9   |  32<H>  |  0.95    Ca    9.3      15 Nov 2020 08:07    TPro  7.2  /  Alb  3.4  /  TBili  0.9  /  DBili  x   /  AST  16  /  ALT  19  /  AlkPhos  57  11-15    LIVER FUNCTIONS - ( 15 Nov 2020 08:07 )  Alb: 3.4 g/dL / Pro: 7.2 gm/dL / ALK PHOS: 57 U/L / ALT: 19 U/L / AST: 16 U/L / GGT: x               Sedimentation Rate, Erythrocyte: 9 mm/hr (11-11 @ 12:41)            Culture Results:   No growth to date. (11-11 @ 15:00)                Radiology:

## 2020-11-16 LAB
ALBUMIN SERPL ELPH-MCNC: 3.3 G/DL — SIGNIFICANT CHANGE UP (ref 3.3–5)
ALP SERPL-CCNC: 51 U/L — SIGNIFICANT CHANGE UP (ref 40–120)
ALT FLD-CCNC: 17 U/L — SIGNIFICANT CHANGE UP (ref 12–78)
ANION GAP SERPL CALC-SCNC: 5 MMOL/L — SIGNIFICANT CHANGE UP (ref 5–17)
AST SERPL-CCNC: 16 U/L — SIGNIFICANT CHANGE UP (ref 15–37)
BILIRUB SERPL-MCNC: 0.8 MG/DL — SIGNIFICANT CHANGE UP (ref 0.2–1.2)
BUN SERPL-MCNC: 27 MG/DL — HIGH (ref 7–23)
CALCIUM SERPL-MCNC: 8.9 MG/DL — SIGNIFICANT CHANGE UP (ref 8.5–10.1)
CHLORIDE SERPL-SCNC: 104 MMOL/L — SIGNIFICANT CHANGE UP (ref 96–108)
CO2 SERPL-SCNC: 30 MMOL/L — SIGNIFICANT CHANGE UP (ref 22–31)
CREAT SERPL-MCNC: 0.93 MG/DL — SIGNIFICANT CHANGE UP (ref 0.5–1.3)
CULTURE RESULTS: SIGNIFICANT CHANGE UP
GLUCOSE SERPL-MCNC: 91 MG/DL — SIGNIFICANT CHANGE UP (ref 70–99)
HCT VFR BLD CALC: 34.1 % — LOW (ref 34.5–45)
HGB BLD-MCNC: 10.3 G/DL — LOW (ref 11.5–15.5)
MCHC RBC-ENTMCNC: 24 PG — LOW (ref 27–34)
MCHC RBC-ENTMCNC: 30.2 GM/DL — LOW (ref 32–36)
MCV RBC AUTO: 79.5 FL — LOW (ref 80–100)
NRBC # BLD: 0 /100 WBCS — SIGNIFICANT CHANGE UP (ref 0–0)
PLATELET # BLD AUTO: 255 K/UL — SIGNIFICANT CHANGE UP (ref 150–400)
POTASSIUM SERPL-MCNC: 4.4 MMOL/L — SIGNIFICANT CHANGE UP (ref 3.5–5.3)
POTASSIUM SERPL-SCNC: 4.4 MMOL/L — SIGNIFICANT CHANGE UP (ref 3.5–5.3)
PROT SERPL-MCNC: 7 GM/DL — SIGNIFICANT CHANGE UP (ref 6–8.3)
RBC # BLD: 4.29 M/UL — SIGNIFICANT CHANGE UP (ref 3.8–5.2)
RBC # FLD: 14.2 % — SIGNIFICANT CHANGE UP (ref 10.3–14.5)
SARS-COV-2 IGG SERPL QL IA: POSITIVE
SARS-COV-2 IGM SERPL IA-ACNC: 8.69 RATIO — HIGH
SODIUM SERPL-SCNC: 139 MMOL/L — SIGNIFICANT CHANGE UP (ref 135–145)
SPECIMEN SOURCE: SIGNIFICANT CHANGE UP
WBC # BLD: 6.18 K/UL — SIGNIFICANT CHANGE UP (ref 3.8–10.5)
WBC # FLD AUTO: 6.18 K/UL — SIGNIFICANT CHANGE UP (ref 3.8–10.5)

## 2020-11-16 PROCEDURE — 99233 SBSQ HOSP IP/OBS HIGH 50: CPT

## 2020-11-16 RX ADMIN — ATORVASTATIN CALCIUM 40 MILLIGRAM(S): 80 TABLET, FILM COATED ORAL at 21:02

## 2020-11-16 RX ADMIN — Medication 1 PATCH: at 11:25

## 2020-11-16 RX ADMIN — CARVEDILOL PHOSPHATE 3.12 MILLIGRAM(S): 80 CAPSULE, EXTENDED RELEASE ORAL at 17:15

## 2020-11-16 RX ADMIN — Medication 1 PATCH: at 11:27

## 2020-11-16 RX ADMIN — MEMANTINE HYDROCHLORIDE 10 MILLIGRAM(S): 10 TABLET ORAL at 17:15

## 2020-11-16 RX ADMIN — LISINOPRIL 5 MILLIGRAM(S): 2.5 TABLET ORAL at 06:29

## 2020-11-16 RX ADMIN — Medication 81 MILLIGRAM(S): at 11:28

## 2020-11-16 RX ADMIN — CARVEDILOL PHOSPHATE 3.12 MILLIGRAM(S): 80 CAPSULE, EXTENDED RELEASE ORAL at 06:29

## 2020-11-16 RX ADMIN — Medication 3 MILLIGRAM(S): at 21:02

## 2020-11-16 RX ADMIN — PENICILLIN G POTASSIUM 100 MILLION UNIT(S): 5000000 POWDER, FOR SOLUTION INTRAMUSCULAR; INTRAPLEURAL; INTRATHECAL; INTRAVENOUS at 06:29

## 2020-11-16 RX ADMIN — PENICILLIN G POTASSIUM 100 MILLION UNIT(S): 5000000 POWDER, FOR SOLUTION INTRAMUSCULAR; INTRAPLEURAL; INTRATHECAL; INTRAVENOUS at 21:34

## 2020-11-16 RX ADMIN — DONEPEZIL HYDROCHLORIDE 5 MILLIGRAM(S): 10 TABLET, FILM COATED ORAL at 21:02

## 2020-11-16 RX ADMIN — PENICILLIN G POTASSIUM 100 MILLION UNIT(S): 5000000 POWDER, FOR SOLUTION INTRAMUSCULAR; INTRAPLEURAL; INTRATHECAL; INTRAVENOUS at 02:30

## 2020-11-16 RX ADMIN — Medication 1 PATCH: at 07:01

## 2020-11-16 RX ADMIN — PENICILLIN G POTASSIUM 100 MILLION UNIT(S): 5000000 POWDER, FOR SOLUTION INTRAMUSCULAR; INTRAPLEURAL; INTRATHECAL; INTRAVENOUS at 17:15

## 2020-11-16 RX ADMIN — Medication 1 PATCH: at 19:50

## 2020-11-16 RX ADMIN — PENICILLIN G POTASSIUM 100 MILLION UNIT(S): 5000000 POWDER, FOR SOLUTION INTRAMUSCULAR; INTRAPLEURAL; INTRATHECAL; INTRAVENOUS at 09:52

## 2020-11-16 RX ADMIN — MEMANTINE HYDROCHLORIDE 10 MILLIGRAM(S): 10 TABLET ORAL at 06:30

## 2020-11-16 RX ADMIN — Medication 40 MILLIGRAM(S): at 06:30

## 2020-11-16 RX ADMIN — HEPARIN SODIUM 5000 UNIT(S): 5000 INJECTION INTRAVENOUS; SUBCUTANEOUS at 17:16

## 2020-11-16 RX ADMIN — HEPARIN SODIUM 5000 UNIT(S): 5000 INJECTION INTRAVENOUS; SUBCUTANEOUS at 06:30

## 2020-11-16 RX ADMIN — PENICILLIN G POTASSIUM 100 MILLION UNIT(S): 5000000 POWDER, FOR SOLUTION INTRAMUSCULAR; INTRAPLEURAL; INTRATHECAL; INTRAVENOUS at 15:11

## 2020-11-16 NOTE — PROGRESS NOTE ADULT - ASSESSMENT
Subjective Complaints:  Historian:             Vital Signs Last 24 Hrs  T(C): 36.8 (16 Nov 2020 15:55), Max: 36.9 (16 Nov 2020 05:28)  T(F): 98.3 (16 Nov 2020 15:55), Max: 98.4 (16 Nov 2020 05:28)  HR: 62 (16 Nov 2020 15:55) (61 - 69)  BP: 116/76 (16 Nov 2020 15:55) (98/56 - 122/73)  BP(mean): --  RR: 16 (16 Nov 2020 15:55) (16 - 18)  SpO2: 98% (16 Nov 2020 15:55) (94% - 98%)    GENERAL PHYSICAL EXAM:  General:  Appears stated age, well-groomed, well-nourished, no distress  HEENT:  NC/AT, patent nares w/ pink mucosa, OP clear w/o lesions, PERRL, EOMI, conjunctivae clear, no thyromegaly, nodules, adenopathy, no JVD  Chest:  Full & symmetric excursion, no increased effort, breath sounds clear  Cardiovascular:  Regular rhythm, S1, S2, no murmur/rub/S3/S4, no carotid/femoral/abdominal bruit, radial/pedal pulses 2+, no edema  Abdomen:  Soft, non-tender, non-distended, normoactive bowel sounds, no HSM  Extremities:  Gait & station:   Digits:   Nails:   Joints, Bones, Muscles:   ROM:   Stability:  Skin:  No rash/erythema/ecchymoses/petechiae/wounds/abscess/warm/dry  Musculoskeletal:  Full ROM in all joints w/o swelling/tenderness/effusion        LABS:                        10.3   6.18  )-----------( 255      ( 16 Nov 2020 07:52 )             34.1     11-16    139  |  104  |  27<H>  ----------------------------<  91  4.4   |  30  |  0.93    Ca    8.9      16 Nov 2020 07:52    TPro  7.0  /  Alb  3.3  /  TBili  0.8  /  DBili  x   /  AST  16  /  ALT  17  /  AlkPhos  51  11-16          RADIOLOGY & ADDITIONAL STUDIES:        Neurology Progress Note:      Mental Status: awaek alert  speech fluent confused       Cranial Nerves: 2 1/.2 intact      Motor:   arm leg 4/5         Sensory: intact      Cerebellar: defrd      Gait: unsteady       Assesment/Plan:  cva dementia s/p spinal tap dvrl csf pending id follow up on n r/o tertiary  syphilis  no seizure

## 2020-11-16 NOTE — PROGRESS NOTE ADULT - SUBJECTIVE AND OBJECTIVE BOX
Patient is a 80y old  Female who presents with a chief complaint of Altered Mental Status (15 Nov 2020 18:56)    INTERVAL HPI/OVERNIGHT EVENTS:  Pt was seen and examined, no acute events.    MEDICATIONS  (STANDING):  aspirin  chewable 81 milliGRAM(s) Oral daily  atorvastatin 40 milliGRAM(s) Oral at bedtime  carvedilol 3.125 milliGRAM(s) Oral every 12 hours  donepezil 5 milliGRAM(s) Oral at bedtime  furosemide    Tablet 40 milliGRAM(s) Oral daily  heparin   Injectable 5000 Unit(s) SubCutaneous every 12 hours  lisinopril 5 milliGRAM(s) Oral daily  melatonin 3 milliGRAM(s) Oral at bedtime  memantine 10 milliGRAM(s) Oral two times a day  nicotine -   7 mG/24Hr(s) Patch 1 patch Transdermal daily  penicillin   G  potassium  IVPB      penicillin   G  potassium  IVPB 3 Million Unit(s) IV Intermittent every 4 hours    MEDICATIONS  (PRN):      Allergies  No Known Allergies        Vital Signs Last 24 Hrs  T(C): 36.7 (16 Nov 2020 11:01), Max: 36.9 (16 Nov 2020 05:28)  T(F): 98 (16 Nov 2020 11:01), Max: 98.4 (16 Nov 2020 05:28)  HR: 62 (16 Nov 2020 11:01) (61 - 69)  BP: 105/67 (16 Nov 2020 11:01) (98/56 - 122/73)  BP(mean): --  RR: 17 (16 Nov 2020 11:01) (16 - 18)  SpO2: 97% (16 Nov 2020 11:01) (94% - 97%)      PHYSICAL EXAM:  GENERAL: NAD  HEAD:  Atraumatic   EYES: PERRLA  NERVOUS SYSTEM:  Awake, Confused   CHEST/LUNG: Clear  HEART: RRR  ABDOMEN: Soft, non tender  EXTREMITIES:  no edema      LABS:                        10.3   6.18  )-----------( 255      ( 16 Nov 2020 07:52 )             34.1     11-16    139  |  104  |  27<H>  ----------------------------<  91  4.4   |  30  |  0.93    Ca    8.9      16 Nov 2020 07:52    TPro  7.0  /  Alb  3.3  /  TBili  0.8  /  DBili  x   /  AST  16  /  ALT  17  /  AlkPhos  51  11-16      CAPILLARY BLOOD GLUCOSE      Culture - CSF with Gram Stain (collected 15 Nov 2020 00:11)  Source: .CSF CSF  Gram Stain (prelim) (15 Nov 2020 19:28):    No polymorphonuclear leukocytes seen    No organisms seen    by cytocentrifuge  Preliminary Report (15 Nov 2020 19:28):    No growth    Culture - Blood (collected 11 Nov 2020 15:00)  Source: .Blood Blood  Preliminary Report (12 Nov 2020 16:02):    No growth to date.      RADIOLOGY & ADDITIONAL TESTS:    Imaging Personally Reviewed:  [ ] YES  [ ] NO    Consultant(s) Notes Reviewed:  [ ] YES  [ ] NO    Care Discussed with Consultants/Other Providers [ ] YES  [ ] NO

## 2020-11-16 NOTE — PROGRESS NOTE ADULT - ASSESSMENT
80 years old female with history of dementia and Hypertension and diabetes was found with wandering with cerebrovascular accident seen and lbbb with elevated trop     Acute Systolic Heart Failure/ NSTEMI:  - Echo with EF < 20 %, dilated cardiomyopathy   - S/P heparin gtt   - Trop trending down  - Continue ASA, plavix restarted, (held for LP) ( NIHSS score < 4, presumed small vessel disease), stop plavix after 21 days.  - Continue statin  - Cardiology eval appreciated  - Stress test cancelled for acute CVA, outpt follow up    Anemia:  - Microcytic anemia  - Neg occult blood  - Iron studies not consistent with iron deficient   - Hb stable, outpt follow up and work up.    Acute/subacute CVA:  - MR brain noted, possibly embolic  - CTA head and neck unremarkable  - Neurology eval appreciated   - ~ 10 day since diagnosis of CVA, discussed with dr. Durbin regarding JOVAN, will follow up after CSF VDRL is back. 2D echo with no thrombus  - On ASA, Plavix, Statin     Tertiary syphilis,. 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 Olivas discussed with ID at [facility] pt was treated for latent TB in 2018  - Started on Penicillin G  - S/P Spinal tap, follow up CSF VDRL  - ID following    Essential hypertension:  - Continue home meds.    DVT ppx    Pt was on Kaletra on admission  - Called pharmacy, pt was prescribed 2 doses of Kaletra for COVID from [facility].    Disposition: Son wants pt to go back home with aide. Awaiting CSF VDRL to determine duration of pen G and then determine if we will do JOVAN inpt.

## 2020-11-17 LAB
ALBUMIN SERPL ELPH-MCNC: 3.1 G/DL — LOW (ref 3.3–5)
ALP SERPL-CCNC: 51 U/L — SIGNIFICANT CHANGE UP (ref 40–120)
ALT FLD-CCNC: 14 U/L — SIGNIFICANT CHANGE UP (ref 12–78)
ANION GAP SERPL CALC-SCNC: 5 MMOL/L — SIGNIFICANT CHANGE UP (ref 5–17)
AST SERPL-CCNC: 11 U/L — LOW (ref 15–37)
BILIRUB SERPL-MCNC: 0.7 MG/DL — SIGNIFICANT CHANGE UP (ref 0.2–1.2)
BUN SERPL-MCNC: 24 MG/DL — HIGH (ref 7–23)
CALCIUM SERPL-MCNC: 8.9 MG/DL — SIGNIFICANT CHANGE UP (ref 8.5–10.1)
CHLORIDE SERPL-SCNC: 102 MMOL/L — SIGNIFICANT CHANGE UP (ref 96–108)
CO2 SERPL-SCNC: 31 MMOL/L — SIGNIFICANT CHANGE UP (ref 22–31)
CREAT SERPL-MCNC: 0.91 MG/DL — SIGNIFICANT CHANGE UP (ref 0.5–1.3)
CULTURE RESULTS: NO GROWTH — SIGNIFICANT CHANGE UP
GLUCOSE SERPL-MCNC: 94 MG/DL — SIGNIFICANT CHANGE UP (ref 70–99)
GRAM STN FLD: SIGNIFICANT CHANGE UP
HCT VFR BLD CALC: 30.5 % — LOW (ref 34.5–45)
HGB BLD-MCNC: 9.5 G/DL — LOW (ref 11.5–15.5)
MCHC RBC-ENTMCNC: 24.2 PG — LOW (ref 27–34)
MCHC RBC-ENTMCNC: 31.1 GM/DL — LOW (ref 32–36)
MCV RBC AUTO: 77.8 FL — LOW (ref 80–100)
NRBC # BLD: 0 /100 WBCS — SIGNIFICANT CHANGE UP (ref 0–0)
PLATELET # BLD AUTO: 235 K/UL — SIGNIFICANT CHANGE UP (ref 150–400)
POTASSIUM SERPL-MCNC: 3.8 MMOL/L — SIGNIFICANT CHANGE UP (ref 3.5–5.3)
POTASSIUM SERPL-SCNC: 3.8 MMOL/L — SIGNIFICANT CHANGE UP (ref 3.5–5.3)
PROT SERPL-MCNC: 6.6 GM/DL — SIGNIFICANT CHANGE UP (ref 6–8.3)
RBC # BLD: 3.92 M/UL — SIGNIFICANT CHANGE UP (ref 3.8–5.2)
RBC # FLD: 13.9 % — SIGNIFICANT CHANGE UP (ref 10.3–14.5)
SODIUM SERPL-SCNC: 138 MMOL/L — SIGNIFICANT CHANGE UP (ref 135–145)
SPECIMEN SOURCE: SIGNIFICANT CHANGE UP
WBC # BLD: 5 K/UL — SIGNIFICANT CHANGE UP (ref 3.8–10.5)
WBC # FLD AUTO: 5 K/UL — SIGNIFICANT CHANGE UP (ref 3.8–10.5)

## 2020-11-17 PROCEDURE — 99233 SBSQ HOSP IP/OBS HIGH 50: CPT

## 2020-11-17 RX ADMIN — ATORVASTATIN CALCIUM 40 MILLIGRAM(S): 80 TABLET, FILM COATED ORAL at 21:06

## 2020-11-17 RX ADMIN — PENICILLIN G POTASSIUM 100 MILLION UNIT(S): 5000000 POWDER, FOR SOLUTION INTRAMUSCULAR; INTRAPLEURAL; INTRATHECAL; INTRAVENOUS at 17:10

## 2020-11-17 RX ADMIN — HEPARIN SODIUM 5000 UNIT(S): 5000 INJECTION INTRAVENOUS; SUBCUTANEOUS at 17:11

## 2020-11-17 RX ADMIN — Medication 1 PATCH: at 11:23

## 2020-11-17 RX ADMIN — PENICILLIN G POTASSIUM 100 MILLION UNIT(S): 5000000 POWDER, FOR SOLUTION INTRAMUSCULAR; INTRAPLEURAL; INTRATHECAL; INTRAVENOUS at 01:38

## 2020-11-17 RX ADMIN — PENICILLIN G POTASSIUM 100 MILLION UNIT(S): 5000000 POWDER, FOR SOLUTION INTRAMUSCULAR; INTRAPLEURAL; INTRATHECAL; INTRAVENOUS at 14:13

## 2020-11-17 RX ADMIN — PENICILLIN G POTASSIUM 100 MILLION UNIT(S): 5000000 POWDER, FOR SOLUTION INTRAMUSCULAR; INTRAPLEURAL; INTRATHECAL; INTRAVENOUS at 11:22

## 2020-11-17 RX ADMIN — Medication 1 PATCH: at 07:41

## 2020-11-17 RX ADMIN — Medication 40 MILLIGRAM(S): at 05:23

## 2020-11-17 RX ADMIN — CARVEDILOL PHOSPHATE 3.12 MILLIGRAM(S): 80 CAPSULE, EXTENDED RELEASE ORAL at 05:23

## 2020-11-17 RX ADMIN — MEMANTINE HYDROCHLORIDE 10 MILLIGRAM(S): 10 TABLET ORAL at 17:12

## 2020-11-17 RX ADMIN — Medication 3 MILLIGRAM(S): at 21:06

## 2020-11-17 RX ADMIN — Medication 1 PATCH: at 12:52

## 2020-11-17 RX ADMIN — MEMANTINE HYDROCHLORIDE 10 MILLIGRAM(S): 10 TABLET ORAL at 05:23

## 2020-11-17 RX ADMIN — HEPARIN SODIUM 5000 UNIT(S): 5000 INJECTION INTRAVENOUS; SUBCUTANEOUS at 05:24

## 2020-11-17 RX ADMIN — PENICILLIN G POTASSIUM 100 MILLION UNIT(S): 5000000 POWDER, FOR SOLUTION INTRAMUSCULAR; INTRAPLEURAL; INTRATHECAL; INTRAVENOUS at 21:10

## 2020-11-17 RX ADMIN — Medication 1 PATCH: at 19:23

## 2020-11-17 RX ADMIN — LISINOPRIL 5 MILLIGRAM(S): 2.5 TABLET ORAL at 11:23

## 2020-11-17 RX ADMIN — Medication 81 MILLIGRAM(S): at 11:23

## 2020-11-17 RX ADMIN — PENICILLIN G POTASSIUM 100 MILLION UNIT(S): 5000000 POWDER, FOR SOLUTION INTRAMUSCULAR; INTRAPLEURAL; INTRATHECAL; INTRAVENOUS at 05:49

## 2020-11-17 RX ADMIN — DONEPEZIL HYDROCHLORIDE 5 MILLIGRAM(S): 10 TABLET, FILM COATED ORAL at 21:06

## 2020-11-17 NOTE — PROGRESS NOTE ADULT - SUBJECTIVE AND OBJECTIVE BOX
HPI:   79yo female with unknown pmh found walking on [place]. Pt gave name and . Pt is AOX2, shivering but otherwise deneis any complaints. Police currently attempting to look for family.    	No fever/chills, No photophobia/eye pain/changes in vision, No ear pain/sore throat/dysphagia, No chest pain/palpitations, no SOB/cough, no wheeze/stridor, No abdominal pain, No N/V/D, no dysuria/frequency/discharge, No neck/back pain, no rash, no changes in neurological status/function.   (2020 14:02)      Allergies    No Known Allergies    Intolerances        MEDICATIONS  (STANDING):  aspirin  chewable 81 milliGRAM(s) Oral daily  atorvastatin 40 milliGRAM(s) Oral at bedtime  carvedilol 3.125 milliGRAM(s) Oral every 12 hours  donepezil 5 milliGRAM(s) Oral at bedtime  furosemide    Tablet 40 milliGRAM(s) Oral daily  heparin   Injectable 5000 Unit(s) SubCutaneous every 12 hours  lisinopril 5 milliGRAM(s) Oral daily  melatonin 3 milliGRAM(s) Oral at bedtime  memantine 10 milliGRAM(s) Oral two times a day  nicotine -   7 mG/24Hr(s) Patch 1 patch Transdermal daily  penicillin   G  potassium  IVPB      penicillin   G  potassium  IVPB 3 Million Unit(s) IV Intermittent every 4 hours    MEDICATIONS  (PRN):      REVIEW OF SYSTEMS:    cannot give reliable history     VITAL SIGNS:  T(C): 36.7 (20 @ 05:10), Max: 37 (20 @ 23:21)  T(F): 98 (20 @ 05:10), Max: 98.6 (20 @ 23:21)  HR: 65 (20 @ 05:10) (62 - 70)  BP: 113/73 (20 @ 05:10) (99/61 - 116/76)  RR: 18 (20 @ 05:10) (16 - 18)  SpO2: 97% (20 @ 05:10) (95% - 98%)  Wt(kg): --    PHYSICAL EXAM:    GENERAL: not in any distress  HEENT: Neck is supple, normocephalic, atraumatic   CHEST/LUNG: Clear to percussion bilaterally; No rales, rhonchi, wheezing  HEART: Regular rate and rhythm; No murmurs, rubs, or gallops  ABDOMEN: Soft, Nontender, Nondistended; Bowel sounds present, no rebound   EXTREMITIES:  2+ Peripheral Pulses, No clubbing, cyanosis, or edema  GENITOURINARY:   SKIN: No rashes or lesions  BACK: no pressor sore   NERVOUS SYSTEM:  Alert & confused     LABS:                         9.5    5.00  )-----------( 235      ( 2020 06:36 )             30.5         138  |  102  |  24<H>  ----------------------------<  94  3.8   |  31  |  0.91    Ca    8.9      2020 06:36    TPro  6.6  /  Alb  3.1<L>  /  TBili  0.7  /  DBili  x   /  AST  11<L>  /  ALT  14  /  AlkPhos  51      LIVER FUNCTIONS - ( 2020 06:36 )  Alb: 3.1 g/dL / Pro: 6.6 gm/dL / ALK PHOS: 51 U/L / ALT: 14 U/L / AST: 11 U/L / GGT: x                                   Culture Results:   No growth (11-15 @ 00:11)  Culture Results:   No Growth Final ( @ 15:00)                Radiology:

## 2020-11-17 NOTE — PROGRESS NOTE ADULT - ASSESSMENT
Toxic metabolic encephalopathy   CT with acute multiple strokes embolic?  s/p spinal tap  csf culture neg pending   awaiting cell count and CSF VDRL pending   positive RPR   patient is following with [place] [facility], she known to have dementia, no ho of hiv, recently diagnosed with covid , s/p treated for latent syphilis with 3 shots for 3 consecutive weeks in 2018   Covid ab pos   r/o post covid stroke   echo no vegetations but dilated cardiomyopathy   blood culture neg   fu EEG   on empiric tx for neurosyphilis   Discussed with neuro on case

## 2020-11-17 NOTE — PROGRESS NOTE ADULT - ASSESSMENT
80 years old female with history of dementia and Hypertension and diabetes was found with wandering with cerebrovascular accident seen and lbbb with elevated trop     Acute Systolic Heart Failure/ NSTEMI:  - Echo with EF < 20 %, dilated cardiomyopathy   - S/P heparin gtt   - Trop trending down  - Continue ASA, plavix   - Continue statin  - Cardiology eval appreciated  - Stress test cancelled for acute CVA    Anemia:  - Microcytic anemia  - Neg occult blood  - Iron studies not consistent with iron deficient   - Hb stable, outpt follow up and work up.    Acute/subacute CVA:  - MR brain noted, possibly embolic  - CTA head and neck unremarkable  - Neurology eval appreciated   - >10 days since diagnosis of CVA, discussed with dr. Durbin regarding JOVAN, will follow up. 2D echo with no thrombus  - Continue ASA, plavix ( NIHSS score < 4, presumed small vessel disease, unless JOVAN positive), stop plavix after 21 days.    Tertiary syphilis,. 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 Olivas discussed with ID at [facility] pt was treated for latent TB in 2018  - Started on Penicillin G  - S/P Spinal tap, follow up CSF VDRL  - ID following    Essential hypertension:  - Continue home meds.    DVT ppx    Pt was on Kaletra on admission  - Called pharmacy, pt was prescribed 2 doses of Kaletra for COVID from [facility].    Disposition: Son wants pt to go back home with aide. Awaiting CSF VDRL to determine duration of pen G, also cardio follow up for possible JOVAN inpt.

## 2020-11-17 NOTE — PROGRESS NOTE ADULT - SUBJECTIVE AND OBJECTIVE BOX
Patient is a 80y old  Female who presents with a chief complaint of Altered Mental Status (17 Nov 2020 10:53)      INTERVAL HPI/OVERNIGHT EVENTS:  Pt was seen and examined, no acute events.    MEDICATIONS  (STANDING):  aspirin  chewable 81 milliGRAM(s) Oral daily  atorvastatin 40 milliGRAM(s) Oral at bedtime  carvedilol 3.125 milliGRAM(s) Oral every 12 hours  donepezil 5 milliGRAM(s) Oral at bedtime  furosemide    Tablet 40 milliGRAM(s) Oral daily  heparin   Injectable 5000 Unit(s) SubCutaneous every 12 hours  lisinopril 5 milliGRAM(s) Oral daily  melatonin 3 milliGRAM(s) Oral at bedtime  memantine 10 milliGRAM(s) Oral two times a day  nicotine -   7 mG/24Hr(s) Patch 1 patch Transdermal daily  penicillin   G  potassium  IVPB      penicillin   G  potassium  IVPB 3 Million Unit(s) IV Intermittent every 4 hours    MEDICATIONS  (PRN):    Allergies  No Known Allergies      Vital Signs Last 24 Hrs  T(C): 36.8 (17 Nov 2020 13:37), Max: 37 (16 Nov 2020 23:21)  T(F): 98.2 (17 Nov 2020 13:37), Max: 98.6 (16 Nov 2020 23:21)  HR: 59 (17 Nov 2020 13:37) (56 - 70)  BP: 111/67 (17 Nov 2020 13:37) (99/61 - 116/76)  BP(mean): --  RR: 18 (17 Nov 2020 13:37) (16 - 18)  SpO2: 98% (17 Nov 2020 13:37) (95% - 98%)      PHYSICAL EXAM:  GENERAL: NAD  HEAD:  Atraumatic   EYES: PERRLA  NERVOUS SYSTEM:  Awake, alert, confsued  CHEST/LUNG: Clear  HEART: RRR  ABDOMEN: Soft, non tender  EXTREMITIES:  no edema      LABS:                        9.5    5.00  )-----------( 235      ( 17 Nov 2020 06:36 )             30.5     11-17    138  |  102  |  24<H>  ----------------------------<  94  3.8   |  31  |  0.91    Ca    8.9      17 Nov 2020 06:36    TPro  6.6  /  Alb  3.1<L>  /  TBili  0.7  /  DBili  x   /  AST  11<L>  /  ALT  14  /  AlkPhos  51  11-17        CAPILLARY BLOOD GLUCOSE          Culture - CSF with Gram Stain (collected 15 Nov 2020 00:11)  Source: .CSF CSF  Gram Stain (prelim) (15 Nov 2020 19:28):    No polymorphonuclear leukocytes seen    No organisms seen    by cytocentrifuge  Preliminary Report (15 Nov 2020 19:28):    No growth      RADIOLOGY & ADDITIONAL TESTS:    Imaging Personally Reviewed:  [ ] YES  [ ] NO    Consultant(s) Notes Reviewed:  [ ] YES  [ ] NO    Care Discussed with Consultants/Other Providers [ ] YES  [ ] NO

## 2020-11-17 NOTE — PROGRESS NOTE ADULT - ASSESSMENT
Subjective Complaints:  Historian:             Vital Signs Last 24 Hrs  T(C): 37.1 (17 Nov 2020 16:00), Max: 37.1 (17 Nov 2020 16:00)  T(F): 98.7 (17 Nov 2020 16:00), Max: 98.7 (17 Nov 2020 16:00)  HR: 70 (17 Nov 2020 16:00) (56 - 70)  BP: 107/69 (17 Nov 2020 16:00) (99/61 - 113/73)  BP(mean): --  RR: 17 (17 Nov 2020 16:00) (17 - 18)  SpO2: 99% (17 Nov 2020 16:00) (95% - 99%)    GENERAL PHYSICAL EXAM:  General:  Appears stated age, well-groomed, well-nourished, no distress  HEENT:  NC/AT, patent nares w/ pink mucosa, OP clear w/o lesions, PERRL, EOMI, conjunctivae clear, no thyromegaly, nodules, adenopathy, no JVD  Chest:  Full & symmetric excursion, no increased effort, breath sounds clear  Cardiovascular:  Regular rhythm, S1, S2, no murmur/rub/S3/S4, no carotid/femoral/abdominal bruit, radial/pedal pulses 2+, no edema  Abdomen:  Soft, non-tender, non-distended, normoactive bowel sounds, no HSM  Extremities:  Gait & station:   Digits:   Nails:   Joints, Bones, Muscles:   ROM:   Stability:  Skin:  No rash/erythema/ecchymoses/petechiae/wounds/abscess/warm/dry  Musculoskeletal:  Full ROM in all joints w/o swelling/tenderness/effusion        LABS:                        9.5    5.00  )-----------( 235      ( 17 Nov 2020 06:36 )             30.5     11-17    138  |  102  |  24<H>  ----------------------------<  94  3.8   |  31  |  0.91    Ca    8.9      17 Nov 2020 06:36    TPro  6.6  /  Alb  3.1<L>  /  TBili  0.7  /  DBili  x   /  AST  11<L>  /  ALT  14  /  AlkPhos  51  11-17          RADIOLOGY & ADDITIONAL STUDIES:        Neurology Progress Note:      Mental Status: awake alert speech fluent confused       Cranial Nerves: 2 1/2 intact      Motor:   arm leg 4/5         Sensory: intact      Cerebellar: defrd      Gait: unsteady gait       Assesment/Plan: cva s/p spinal  tap for  csf  vdrl  dementia id folow up on pcn

## 2020-11-18 LAB
ALBUMIN SERPL ELPH-MCNC: 3.2 G/DL — LOW (ref 3.3–5)
ALP SERPL-CCNC: 53 U/L — SIGNIFICANT CHANGE UP (ref 40–120)
ALT FLD-CCNC: 16 U/L — SIGNIFICANT CHANGE UP (ref 12–78)
ANION GAP SERPL CALC-SCNC: 5 MMOL/L — SIGNIFICANT CHANGE UP (ref 5–17)
AST SERPL-CCNC: 13 U/L — LOW (ref 15–37)
BILIRUB SERPL-MCNC: 0.6 MG/DL — SIGNIFICANT CHANGE UP (ref 0.2–1.2)
BUN SERPL-MCNC: 22 MG/DL — SIGNIFICANT CHANGE UP (ref 7–23)
CALCIUM SERPL-MCNC: 9 MG/DL — SIGNIFICANT CHANGE UP (ref 8.5–10.1)
CHLORIDE SERPL-SCNC: 104 MMOL/L — SIGNIFICANT CHANGE UP (ref 96–108)
CO2 SERPL-SCNC: 30 MMOL/L — SIGNIFICANT CHANGE UP (ref 22–31)
CREAT SERPL-MCNC: 0.96 MG/DL — SIGNIFICANT CHANGE UP (ref 0.5–1.3)
GLUCOSE SERPL-MCNC: 105 MG/DL — HIGH (ref 70–99)
HCT VFR BLD CALC: 32.5 % — LOW (ref 34.5–45)
HGB BLD-MCNC: 9.8 G/DL — LOW (ref 11.5–15.5)
MCHC RBC-ENTMCNC: 24 PG — LOW (ref 27–34)
MCHC RBC-ENTMCNC: 30.2 GM/DL — LOW (ref 32–36)
MCV RBC AUTO: 79.7 FL — LOW (ref 80–100)
NRBC # BLD: 0 /100 WBCS — SIGNIFICANT CHANGE UP (ref 0–0)
PLATELET # BLD AUTO: 249 K/UL — SIGNIFICANT CHANGE UP (ref 150–400)
POTASSIUM SERPL-MCNC: 4 MMOL/L — SIGNIFICANT CHANGE UP (ref 3.5–5.3)
POTASSIUM SERPL-SCNC: 4 MMOL/L — SIGNIFICANT CHANGE UP (ref 3.5–5.3)
PROT SERPL-MCNC: 6.8 GM/DL — SIGNIFICANT CHANGE UP (ref 6–8.3)
RBC # BLD: 4.08 M/UL — SIGNIFICANT CHANGE UP (ref 3.8–5.2)
RBC # FLD: 14 % — SIGNIFICANT CHANGE UP (ref 10.3–14.5)
SODIUM SERPL-SCNC: 139 MMOL/L — SIGNIFICANT CHANGE UP (ref 135–145)
VDRL CSF-TITR: NEGATIVE — SIGNIFICANT CHANGE UP
WBC # BLD: 4.8 K/UL — SIGNIFICANT CHANGE UP (ref 3.8–10.5)
WBC # FLD AUTO: 4.8 K/UL — SIGNIFICANT CHANGE UP (ref 3.8–10.5)

## 2020-11-18 PROCEDURE — 99232 SBSQ HOSP IP/OBS MODERATE 35: CPT

## 2020-11-18 PROCEDURE — 76937 US GUIDE VASCULAR ACCESS: CPT | Mod: 26,59

## 2020-11-18 PROCEDURE — 36569 INSJ PICC 5 YR+ W/O IMAGING: CPT

## 2020-11-18 RX ORDER — CHLORHEXIDINE GLUCONATE 213 G/1000ML
1 SOLUTION TOPICAL DAILY
Refills: 0 | Status: DISCONTINUED | OUTPATIENT
Start: 2020-11-18 | End: 2020-12-01

## 2020-11-18 RX ADMIN — Medication 3 MILLIGRAM(S): at 21:02

## 2020-11-18 RX ADMIN — PENICILLIN G POTASSIUM 100 MILLION UNIT(S): 5000000 POWDER, FOR SOLUTION INTRAMUSCULAR; INTRAPLEURAL; INTRATHECAL; INTRAVENOUS at 17:27

## 2020-11-18 RX ADMIN — Medication 1 PATCH: at 19:42

## 2020-11-18 RX ADMIN — ATORVASTATIN CALCIUM 40 MILLIGRAM(S): 80 TABLET, FILM COATED ORAL at 21:02

## 2020-11-18 RX ADMIN — Medication 1 PATCH: at 13:13

## 2020-11-18 RX ADMIN — PENICILLIN G POTASSIUM 100 MILLION UNIT(S): 5000000 POWDER, FOR SOLUTION INTRAMUSCULAR; INTRAPLEURAL; INTRATHECAL; INTRAVENOUS at 01:07

## 2020-11-18 RX ADMIN — HEPARIN SODIUM 5000 UNIT(S): 5000 INJECTION INTRAVENOUS; SUBCUTANEOUS at 17:27

## 2020-11-18 RX ADMIN — PENICILLIN G POTASSIUM 100 MILLION UNIT(S): 5000000 POWDER, FOR SOLUTION INTRAMUSCULAR; INTRAPLEURAL; INTRATHECAL; INTRAVENOUS at 10:05

## 2020-11-18 RX ADMIN — CARVEDILOL PHOSPHATE 3.12 MILLIGRAM(S): 80 CAPSULE, EXTENDED RELEASE ORAL at 17:25

## 2020-11-18 RX ADMIN — PENICILLIN G POTASSIUM 100 MILLION UNIT(S): 5000000 POWDER, FOR SOLUTION INTRAMUSCULAR; INTRAPLEURAL; INTRATHECAL; INTRAVENOUS at 21:03

## 2020-11-18 RX ADMIN — CARVEDILOL PHOSPHATE 3.12 MILLIGRAM(S): 80 CAPSULE, EXTENDED RELEASE ORAL at 05:16

## 2020-11-18 RX ADMIN — PENICILLIN G POTASSIUM 100 MILLION UNIT(S): 5000000 POWDER, FOR SOLUTION INTRAMUSCULAR; INTRAPLEURAL; INTRATHECAL; INTRAVENOUS at 05:14

## 2020-11-18 RX ADMIN — MEMANTINE HYDROCHLORIDE 10 MILLIGRAM(S): 10 TABLET ORAL at 05:16

## 2020-11-18 RX ADMIN — Medication 81 MILLIGRAM(S): at 13:12

## 2020-11-18 RX ADMIN — HEPARIN SODIUM 5000 UNIT(S): 5000 INJECTION INTRAVENOUS; SUBCUTANEOUS at 05:16

## 2020-11-18 RX ADMIN — DONEPEZIL HYDROCHLORIDE 5 MILLIGRAM(S): 10 TABLET, FILM COATED ORAL at 21:02

## 2020-11-18 RX ADMIN — Medication 40 MILLIGRAM(S): at 05:16

## 2020-11-18 RX ADMIN — MEMANTINE HYDROCHLORIDE 10 MILLIGRAM(S): 10 TABLET ORAL at 17:26

## 2020-11-18 RX ADMIN — LISINOPRIL 5 MILLIGRAM(S): 2.5 TABLET ORAL at 05:16

## 2020-11-18 RX ADMIN — Medication 1 PATCH: at 17:28

## 2020-11-18 NOTE — PROCEDURE NOTE - NSPROCDETAILS_GEN_ALL_CORE
sterile dressing applied/ultrasound guidance/supine position/location identified, draped/prepped, sterile technique used/sterile technique, catheter placed/Trendelenburg position/ultrasound assessment

## 2020-11-18 NOTE — PROGRESS NOTE ADULT - ASSESSMENT
80 years old female with history of dementia and Hypertension and diabetes was found with wandering with cerebrovascular accident seen and LBBB with elevated trop     Acute Systolic Heart Failure/ NSTEMI:  - Echo with EF < 20 %, dilated cardiomyopathy   - S/P heparin gtt   - Continue ASA, plavix   - Continue statin  - Cardiology reconsulted. scheduling patient for JOVAN.   - Stress test cancelled for acute CVA    Anemia:  - Microcytic anemia  - Neg occult blood  - Iron studies not consistent with iron deficient   - Hb stable, outpt follow up and work up.    Acute/subacute CVA:  - MR brain noted, possibly embolic  - CTA head and neck unremarkable  - Neurology eval appreciated   - >10 days since diagnosis of CVA. JOVAN per cards to evaluate for Cardiac thrombus.   - Continue ASA, plavix ( NIHSS score < 4, presumed small vessel disease, unless JOVAN positive), stop plavix after 21 days.    Tertiary syphilis,. 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 Olivas discussed with ID at [facility] pt was treated for latent TB in 2018  -Cont Penicillin G  - S/P Spinal tap, follow up CSF VDRL  - Discussed with Dr Olivas in person> Pending CSF VDRL result.     Essential hypertension: controlled.   - Continue home meds.    Anemia. no active gross bleeding.     Full code  tried to reach son but no response.

## 2020-11-18 NOTE — PROGRESS NOTE ADULT - SUBJECTIVE AND OBJECTIVE BOX
CHIEF COMPLAINT: Follow up of acute stroke  no fever  no new weakness reported  limited ROS because of dementia       PHYSICAL EXAM:    GENERAL: Elderly pleasant   CHEST/LUNG: Clear to ausculation bilaterally, no wheezing, no crackles   HEART: Regular rate and rhythm; No murmurs, rubs  ABDOMEN: Soft, Nontender, Nondistended; Bowel sounds present  EXTREMITIES:  Moving all four extremities spontaneously, No clubbing, cyanosis, or edema  NERVOUS SYSTEM:  able to lift her arms and make a fist bilaterally.   Psychiatry: AAO x name only        OBJECTIVE DATA:   Vital Signs Last 24 Hrs  T(C): 36.2 (2020 11:02), Max: 37.1 (2020 16:00)  T(F): 97.2 (2020 11:02), Max: 98.8 (2020 23:31)  HR: 73 (2020 11:02) (29 - 80)  BP: 119/53 (2020 11:02) (106/66 - 148/73)  BP(mean): --  RR: 18 (2020 11:02) (17 - 18)  SpO2: 99% (2020 11:02) (95% - 99%)           Daily     Daily Weight in k.2 (2020 05:03)  LABS:                        9.8    4.80  )-----------( 249      ( 2020 08:18 )             32.5                 139  |  104  |  22  ----------------------------<  105<H>  4.0   |  30  |  0.96    Ca    9.0      2020 08:18    TPro  6.8  /  Alb  3.2<L>  /  TBili  0.6  /  DBili  x   /  AST  13<L>  /  ALT  16  /  AlkPhos  53                            MEDICATIONS  (STANDING):  aspirin  chewable 81 milliGRAM(s) Oral daily  atorvastatin 40 milliGRAM(s) Oral at bedtime  carvedilol 3.125 milliGRAM(s) Oral every 12 hours  donepezil 5 milliGRAM(s) Oral at bedtime  furosemide    Tablet 40 milliGRAM(s) Oral daily  heparin   Injectable 5000 Unit(s) SubCutaneous every 12 hours  lisinopril 5 milliGRAM(s) Oral daily  melatonin 3 milliGRAM(s) Oral at bedtime  memantine 10 milliGRAM(s) Oral two times a day  nicotine -   7 mG/24Hr(s) Patch 1 patch Transdermal daily  penicillin   G  potassium  IVPB      penicillin   G  potassium  IVPB 3 Million Unit(s) IV Intermittent every 4 hours    MEDICATIONS  (PRN):

## 2020-11-18 NOTE — PROGRESS NOTE ADULT - ASSESSMENT
Subjective Complaints:  Historian:             Vital Signs Last 24 Hrs  T(C): 36.7 (18 Nov 2020 17:22), Max: 37.1 (17 Nov 2020 23:31)  T(F): 98.1 (18 Nov 2020 17:22), Max: 98.8 (17 Nov 2020 23:31)  HR: 70 (18 Nov 2020 17:22) (29 - 80)  BP: 111/69 (18 Nov 2020 17:22) (106/66 - 148/73)  BP(mean): --  RR: 17 (18 Nov 2020 17:22) (17 - 18)  SpO2: 95% (18 Nov 2020 17:22) (95% - 99%)    GENERAL PHYSICAL EXAM:  General:  Appears stated age, well-groomed, well-nourished, no distress  HEENT:  NC/AT, patent nares w/ pink mucosa, OP clear w/o lesions, PERRL, EOMI, conjunctivae clear, no thyromegaly, nodules, adenopathy, no JVD  Chest:  Full & symmetric excursion, no increased effort, breath sounds clear  Cardiovascular:  Regular rhythm, S1, S2, no murmur/rub/S3/S4, no carotid/femoral/abdominal bruit, radial/pedal pulses 2+, no edema  Abdomen:  Soft, non-tender, non-distended, normoactive bowel sounds, no HSM  Extremities:  Gait & station:   Digits:   Nails:   Joints, Bones, Muscles:   ROM:   Stability:  Skin:  No rash/erythema/ecchymoses/petechiae/wounds/abscess/warm/dry  Musculoskeletal:  Full ROM in all joints w/o swelling/tenderness/effusion        LABS:                        9.8    4.80  )-----------( 249      ( 18 Nov 2020 08:18 )             32.5     11-18    139  |  104  |  22  ----------------------------<  105<H>  4.0   |  30  |  0.96    Ca    9.0      18 Nov 2020 08:18    TPro  6.8  /  Alb  3.2<L>  /  TBili  0.6  /  DBili  x   /  AST  13<L>  /  ALT  16  /  AlkPhos  53  11-18          RADIOLOGY & ADDITIONAL STUDIES:        Neurology Progress Note:      Mental Status: awake  alert speech fluent confused       Cranial Nerves: 2 1/2 intact      Motor:   arm leg 4/5         Sensory: intact      Cerebellar: defrd      Gait: unsteady       Assesment/Plan: cva confused dementia on pcn csf vdrl pending id folow up for sub acute rehab

## 2020-11-18 NOTE — PROGRESS NOTE ADULT - ASSESSMENT
Toxic metabolic encephalopathy   CT with acute multiple strokes embolic?  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

## 2020-11-18 NOTE — CHART NOTE - NSCHARTNOTEFT_GEN_A_CORE
Assessment:  Pt adm c dx of AMS, CVA.   Pt is confused, unable to provide diet history.  Pt c acute CHF/NSTEMI, anemia(microcytic), tertiary syphilis, r/o neurosyphillis, HTN.  Pt c history of dementia and Hypertension and diabetes ?( no diabetic meds noted in home meds & good glycemic control since adm) found wandering PTA    Factors impacting intake: [ ] none [ ] nausea  [ ] vomiting [ ] diarrhea [ ] constipation  [x ]chewing problems; missing teeth noted, denied problems c chewing/swallowing, as per RN, pt is able to tolerate whole foods   [ ] swallowing issues  [ x] other:     Diet Prescription: Diet, Regular:   Supplement Feeding Modality:  Oral  Ensure Enlive Cans or Servings Per Day:  1       Frequency:  Two Times a day (11-06-20 @ 15:16)    Intake: <75% of meals, taking Ensure Enlive     Current Weight: 11/18, 51.2 kg, 11/05, 51.0 kg, c wt. gain of 0.2 kg   % Weight Change: 0.39%  no edema noted    Nutrition focused physical exam conducted; Subcutaneous fat Exam;  [ Moderate  ]  Orbital fat pads region,  [ Unable  ]Buccal fat region,  [ Moderate  ]triceps region, [  Unable ]ribs region.  Muscle Exam; [  Moderate ]temples region, [ Severe  ]clavicle region, [ Severe ]shoulder region, [  Unable ]Scapula region, [ Moderate   ]Interosseous region, [ Moderate  ]thigh region, [ Moderate  ]Calf region      Pertinent Medications: MEDICATIONS  (STANDING):  aspirin  chewable 81 milliGRAM(s) Oral daily  atorvastatin 40 milliGRAM(s) Oral at bedtime  carvedilol 3.125 milliGRAM(s) Oral every 12 hours  donepezil 5 milliGRAM(s) Oral at bedtime  furosemide    Tablet 40 milliGRAM(s) Oral daily  heparin   Injectable 5000 Unit(s) SubCutaneous every 12 hours  lisinopril 5 milliGRAM(s) Oral daily  melatonin 3 milliGRAM(s) Oral at bedtime  memantine 10 milliGRAM(s) Oral two times a day  nicotine -   7 mG/24Hr(s) Patch 1 patch Transdermal daily  penicillin   G  potassium  IVPB      penicillin   G  potassium  IVPB 3 Million Unit(s) IV Intermittent every 4 hours    MEDICATIONS  (PRN):    Pertinent Labs: 11-18 Na139 mmol/L Glu 105 mg/dL<H> K+ 4.0 mmol/L Cr  0.96 mg/dL BUN 22 mg/dL 11-18 Alb 3.2 g/dL<L> 11-07 Chol 127 mg/dL LDL --    HDL 58 mg/dL Trig 50 mg/dL11-18 ALT 16 U/L AST 13 U/L<L> Alkaline Phosphatase 53 U/L   CAPILLARY BLOOD GLUCOSE    Skin: WDL except ecchymosis     Estimated Needs:   [ x] no change since previous assessment (11/06/20)  [ ] recalculated:     Previous Nutrition Diagnosis:   Malnutrition Moderate malnutrition in context of chronic illness.   Etiology Inadequate energy/protein intake related to dementia, AMS.   Signs/Symptoms Physical findings of moderate/severe fat depletion & muscle wasting.   Goal/Expected Outcome Pt to consume >75% meals/supplements; not consistently met maintain wt +/- 2# during hospitalization; met     Nutrition Diagnosis is [x ] ongoing  [ ] resolved [ ] not applicable       New Nutrition Diagnosis: [ x] not applicable     Interventions:   Recommend  [ x] Change Diet To: Low sodium, soft(dental soft)  [x ] Nutrition Supplement; Ensure Enlive 2 x day=750 calories, 40 grams protein   [ ] Nutrition Support  [x ] Other: consider Iron supplementation     Monitoring and Evaluation:   [ x] PO intake [ x ] Tolerance to diet prescription [ x ] weights [ x ] labs; glucose obtain A1C%[ x ] follow up per protocol  [ ] other:

## 2020-11-18 NOTE — PROCEDURE NOTE - ADDITIONAL PROCEDURE DETAILS
No Patient seen at bedside for midline catheter placement.  Consent obtained from patient's son after risks/benefits/alternatives explained.   Upper extremity prepped and draped in usual sterile fashion. Time out performed with RN. 4Fr 15 cm single lumen midline catheter placed using ultrasound guided seldinger technique, L basilic vein. Flushes well, with good venous return. Patient tolerated procedure well without complication and was left in no acute distress. Upper arm circumference noted to be 26 cm.

## 2020-11-18 NOTE — PROGRESS NOTE ADULT - SUBJECTIVE AND OBJECTIVE BOX
HPI:   81yo female with unknown pmh [place]. Pt gave name and . Pt is AOX2, shivering but otherwise deneis any complaints. Police currently attempting to look for family.    	No fever/chills, No photophobia/eye pain/changes in vision, No ear pain/sore throat/dysphagia, No chest pain/palpitations, no SOB/cough, no wheeze/stridor, No abdominal pain, No N/V/D, no dysuria/frequency/discharge, No neck/back pain, no rash, no changes in neurological status/function.   (2020 14:02)      Allergies    No Known Allergies    Intolerances        MEDICATIONS  (STANDING):  aspirin  chewable 81 milliGRAM(s) Oral daily  atorvastatin 40 milliGRAM(s) Oral at bedtime  carvedilol 3.125 milliGRAM(s) Oral every 12 hours  donepezil 5 milliGRAM(s) Oral at bedtime  furosemide    Tablet 40 milliGRAM(s) Oral daily  heparin   Injectable 5000 Unit(s) SubCutaneous every 12 hours  lisinopril 5 milliGRAM(s) Oral daily  melatonin 3 milliGRAM(s) Oral at bedtime  memantine 10 milliGRAM(s) Oral two times a day  nicotine -   7 mG/24Hr(s) Patch 1 patch Transdermal daily  penicillin   G  potassium  IVPB      penicillin   G  potassium  IVPB 3 Million Unit(s) IV Intermittent every 4 hours    MEDICATIONS  (PRN):      REVIEW OF SYSTEMS:    demented lady cannot give reliable history        VITAL SIGNS:  T(C): 36.8 (20 @ 05:03), Max: 37.1 (20 @ 16:00)  T(F): 98.3 (20 @ 05:03), Max: 98.8 (20 @ 23:31)  HR: 80 (20 @ 05:03) (29 - 80)  BP: 148/73 (20 @ 05:03) (106/66 - 148/73)  RR: 18 (20 @ 05:03) (17 - 18)  SpO2: 99% (20 @ 05:03) (95% - 99%)  Wt(kg): --    PHYSICAL EXAM:    GENERAL: not in any distress  HEENT: Neck is supple, normocephalic, atraumatic   CHEST/LUNG: Clear to percussion bilaterally; No rales, rhonchi, wheezing  HEART: Regular rate and rhythm; No murmurs, rubs, or gallops  ABDOMEN: Soft, Nontender, Nondistended; Bowel sounds present, no rebound   EXTREMITIES:  2+ Peripheral Pulses, No clubbing, cyanosis, or edema  GENITOURINARY:   SKIN: No rashes or lesions  BACK: no pressor sore   NERVOUS SYSTEM:  Alert & demented     LABS:                         9.8    4.80  )-----------( 249      ( 2020 08:18 )             32.5         139  |  104  |  22  ----------------------------<  105<H>  4.0   |  30  |  0.96    Ca    9.0      2020 08:18    TPro  6.8  /  Alb  3.2<L>  /  TBili  0.6  /  DBili  x   /  AST  13<L>  /  ALT  16  /  AlkPhos  53      LIVER FUNCTIONS - ( 2020 08:18 )  Alb: 3.2 g/dL / Pro: 6.8 gm/dL / ALK PHOS: 53 U/L / ALT: 16 U/L / AST: 13 U/L / GGT: x                           Culture Results:   No growth (11-15 @ 00:11)  Culture Results:   No Growth Final ( @ 15:00)                Radiology:

## 2020-11-19 LAB
HCT VFR BLD CALC: 31.3 % — LOW (ref 34.5–45)
HGB BLD-MCNC: 9.8 G/DL — LOW (ref 11.5–15.5)
MCHC RBC-ENTMCNC: 24.9 PG — LOW (ref 27–34)
MCHC RBC-ENTMCNC: 31.3 GM/DL — LOW (ref 32–36)
MCV RBC AUTO: 79.6 FL — LOW (ref 80–100)
NRBC # BLD: 0 /100 WBCS — SIGNIFICANT CHANGE UP (ref 0–0)
PLATELET # BLD AUTO: 250 K/UL — SIGNIFICANT CHANGE UP (ref 150–400)
RBC # BLD: 3.93 M/UL — SIGNIFICANT CHANGE UP (ref 3.8–5.2)
RBC # FLD: 14.1 % — SIGNIFICANT CHANGE UP (ref 10.3–14.5)
WBC # BLD: 5.21 K/UL — SIGNIFICANT CHANGE UP (ref 3.8–10.5)
WBC # FLD AUTO: 5.21 K/UL — SIGNIFICANT CHANGE UP (ref 3.8–10.5)

## 2020-11-19 PROCEDURE — 99232 SBSQ HOSP IP/OBS MODERATE 35: CPT

## 2020-11-19 RX ORDER — LISINOPRIL 2.5 MG/1
2.5 TABLET ORAL DAILY
Refills: 0 | Status: DISCONTINUED | OUTPATIENT
Start: 2020-11-19 | End: 2020-11-21

## 2020-11-19 RX ADMIN — Medication 1 PATCH: at 19:00

## 2020-11-19 RX ADMIN — MEMANTINE HYDROCHLORIDE 10 MILLIGRAM(S): 10 TABLET ORAL at 06:54

## 2020-11-19 RX ADMIN — Medication 1 PATCH: at 10:45

## 2020-11-19 RX ADMIN — PENICILLIN G POTASSIUM 100 MILLION UNIT(S): 5000000 POWDER, FOR SOLUTION INTRAMUSCULAR; INTRAPLEURAL; INTRATHECAL; INTRAVENOUS at 07:35

## 2020-11-19 RX ADMIN — CARVEDILOL PHOSPHATE 3.12 MILLIGRAM(S): 80 CAPSULE, EXTENDED RELEASE ORAL at 19:01

## 2020-11-19 RX ADMIN — PENICILLIN G POTASSIUM 100 MILLION UNIT(S): 5000000 POWDER, FOR SOLUTION INTRAMUSCULAR; INTRAPLEURAL; INTRATHECAL; INTRAVENOUS at 01:05

## 2020-11-19 RX ADMIN — MEMANTINE HYDROCHLORIDE 10 MILLIGRAM(S): 10 TABLET ORAL at 19:02

## 2020-11-19 RX ADMIN — Medication 1 PATCH: at 14:00

## 2020-11-19 RX ADMIN — Medication 81 MILLIGRAM(S): at 13:59

## 2020-11-19 RX ADMIN — Medication 1 PATCH: at 19:53

## 2020-11-19 RX ADMIN — ATORVASTATIN CALCIUM 40 MILLIGRAM(S): 80 TABLET, FILM COATED ORAL at 21:46

## 2020-11-19 RX ADMIN — DONEPEZIL HYDROCHLORIDE 5 MILLIGRAM(S): 10 TABLET, FILM COATED ORAL at 21:46

## 2020-11-19 RX ADMIN — HEPARIN SODIUM 5000 UNIT(S): 5000 INJECTION INTRAVENOUS; SUBCUTANEOUS at 19:01

## 2020-11-19 RX ADMIN — Medication 3 MILLIGRAM(S): at 21:46

## 2020-11-19 NOTE — PROGRESS NOTE ADULT - ASSESSMENT
80 years old female with history of dementia and Hypertension and diabetes was found with wandering with cerebrovascular accident seen and LBBB with elevated trop     Acute Systolic Heart Failure/ NSTEMI:  - Echo with EF < 20 %, dilated cardiomyopathy   - S/P heparin gtt   - Continue ASA, plavix   - Continue statin  - Cardiology reconsulted. scheduling patient for JOVAN.   - Stress test cancelled for acute CVA    Anemia:  - Microcytic anemia  - Neg occult blood  - Iron studies not consistent with iron deficient   - Hb stable, outpt follow up and work up.    Acute/subacute CVA:  - MR brain noted, possibly embolic  - CTA head and neck unremarkable  - Neurology eval appreciated   - >10 days since diagnosis of CVA. JOVAN per cards (discussed with Steffany) to evaluate for Cardiac thrombus.   - Continue ASA, plavix ( NIHSS score < 4, presumed small vessel disease, unless JOVAN positive), stop plavix after 21 days.    Tertiary syphilis,. 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 Dced PCN.     Essential hypertension: low normal. Decrease lisinopril. Montor.     Anemia. no active gross bleeding.     Full code  No response from son.

## 2020-11-19 NOTE — PROGRESS NOTE ADULT - SUBJECTIVE AND OBJECTIVE BOX
HPI:   79yo female with unknown pmh [place]. Pt gave name and . Pt is AOX2, shivering but otherwise deneis any complaints. Police currently attempting to look for family.    	No fever/chills, No photophobia/eye pain/changes in vision, No ear pain/sore throat/dysphagia, No chest pain/palpitations, no SOB/cough, no wheeze/stridor, No abdominal pain, No N/V/D, no dysuria/frequency/discharge, No neck/back pain, no rash, no changes in neurological status/function.   (2020 14:02)      Allergies    No Known Allergies    Intolerances        MEDICATIONS  (STANDING):  aspirin  chewable 81 milliGRAM(s) Oral daily  atorvastatin 40 milliGRAM(s) Oral at bedtime  carvedilol 3.125 milliGRAM(s) Oral every 12 hours  chlorhexidine 4% Liquid 1 Application(s) Topical daily  donepezil 5 milliGRAM(s) Oral at bedtime  furosemide    Tablet 40 milliGRAM(s) Oral daily  heparin   Injectable 5000 Unit(s) SubCutaneous every 12 hours  lisinopril 5 milliGRAM(s) Oral daily  melatonin 3 milliGRAM(s) Oral at bedtime  memantine 10 milliGRAM(s) Oral two times a day  nicotine -   7 mG/24Hr(s) Patch 1 patch Transdermal daily  penicillin   G  potassium  IVPB 3 Million Unit(s) IV Intermittent every 4 hours  penicillin   G  potassium  IVPB        MEDICATIONS  (PRN):      REVIEW OF SYSTEMS:    demented cannot give reliable ho       VITAL SIGNS:  T(C): 36.4 (20 @ 05:18), Max: 36.7 (20 @ 17:22)  T(F): 97.5 (20 @ 05:18), Max: 98.1 (20 @ 17:22)  HR: 63 (20 @ 05:18) (63 - 73)  BP: 94/50 (20 @ 05:18) (94/50 - 119/53)  RR: 18 (20 @ 05:18) (16 - 18)  SpO2: 97% (20 @ 05:18) (93% - 99%)  Wt(kg): --    PHYSICAL EXAM:    GENERAL: not in any distress  HEENT: Neck is supple, normocephalic, atraumatic   CHEST/LUNG: Clear to percussion bilaterally; No rales, rhonchi, wheezing  HEART: Regular rate and rhythm; No murmurs, rubs, or gallops  ABDOMEN: Soft, Nontender, Nondistended; Bowel sounds present, no rebound   EXTREMITIES:  2+ Peripheral Pulses, No clubbing, cyanosis, or edema  GENITOURINARY:   SKIN: No rashes or lesions  BACK: no pressor sore   NERVOUS SYSTEM:  Alert & demented , cannot remember where , which year   LABS:                         9.8    5.21  )-----------( 250      ( 2020 08:05 )             31.3     -    139  |  104  |  22  ----------------------------<  105<H>  4.0   |  30  |  0.96    Ca    9.0      2020 08:18    TPro  6.8  /  Alb  3.2<L>  /  TBili  0.6  /  DBili  x   /  AST  13<L>  /  ALT  16  /  AlkPhos  53  -18    LIVER FUNCTIONS - ( 2020 08:18 )  Alb: 3.2 g/dL / Pro: 6.8 gm/dL / ALK PHOS: 53 U/L / ALT: 16 U/L / AST: 13 U/L / GGT: x                               Culture Results:   No growth (11-15 @ 00:11)                Radiology:

## 2020-11-19 NOTE — PROGRESS NOTE ADULT - ASSESSMENT
Toxic metabolic encephalopathy / Dementia   CT with acute multiple strokes embolic?  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

## 2020-11-19 NOTE — PROGRESS NOTE ADULT - ASSESSMENT
Subjective Complaints:  Historian:             Vital Signs Last 24 Hrs  T(C): 36.9 (19 Nov 2020 16:40), Max: 36.9 (19 Nov 2020 16:40)  T(F): 98.5 (19 Nov 2020 16:40), Max: 98.5 (19 Nov 2020 16:40)  HR: 65 (19 Nov 2020 16:40) (63 - 70)  BP: 96/58 (19 Nov 2020 16:40) (94/50 - 103/60)  BP(mean): 84 (19 Nov 2020 10:18) (84 - 84)  RR: 16 (19 Nov 2020 16:40) (16 - 18)  SpO2: 97% (19 Nov 2020 16:40) (93% - 97%)    GENERAL PHYSICAL EXAM:  General:  Appears stated age, well-groomed, well-nourished, no distress  HEENT:  NC/AT, patent nares w/ pink mucosa, OP clear w/o lesions, PERRL, EOMI, conjunctivae clear, no thyromegaly, nodules, adenopathy, no JVD  Chest:  Full & symmetric excursion, no increased effort, breath sounds clear  Cardiovascular:  Regular rhythm, S1, S2, no murmur/rub/S3/S4, no carotid/femoral/abdominal bruit, radial/pedal pulses 2+, no edema  Abdomen:  Soft, non-tender, non-distended, normoactive bowel sounds, no HSM  Extremities:  Gait & station:   Digits:   Nails:   Joints, Bones, Muscles:   ROM:   Stability:  Skin:  No rash/erythema/ecchymoses/petechiae/wounds/abscess/warm/dry  Musculoskeletal:  Full ROM in all joints w/o swelling/tenderness/effusion        LABS:                        9.8    5.21  )-----------( 250      ( 19 Nov 2020 08:05 )             31.3     11-18    139  |  104  |  22  ----------------------------<  105<H>  4.0   |  30  |  0.96    Ca    9.0      18 Nov 2020 08:18    TPro  6.8  /  Alb  3.2<L>  /  TBili  0.6  /  DBili  x   /  AST  13<L>  /  ALT  16  /  AlkPhos  53  11-18          RADIOLOGY & ADDITIONAL STUDIES:        Neurology Progress Note:      Mental Status: awaek alert speech fluent  confused       Cranial Nerves: 2 1/2 intact      Motor:   arm leg 4/5         Sensory: inatct      Cerebellar: defrd      Gait: unsteady       Assesment/Plan: cva dementia no seziure csf vdrl negative id folow up had  pcn  for sub acute rehab

## 2020-11-19 NOTE — PROGRESS NOTE ADULT - SUBJECTIVE AND OBJECTIVE BOX
CHIEF COMPLAINT: Follow up of acute stroke/NSTEMI  blood pressure low normal but no dizziness reported.   no fever no cough.   pulled midline but has peripheral line now.       PHYSICAL EXAM:    GENERAL: Elderly pleasant   CHEST/LUNG: Clear to ausculation bilaterally, no wheezing, no crackles   HEART: Regular rate and rhythm; No murmurs, rubs  ABDOMEN: Soft, Nontender, Nondistended; Bowel sounds present  EXTREMITIES:  Moving all four extremities spontaneously, No clubbing, cyanosis,  NERVOUS SYSTEM:  able to lift her arms and make a fist bilaterally.   Psychiatry: AAO x name only        OBJECTIVE DATA:       Vital Signs Last 24 Hrs  T(C): 36.4 (2020 10:18), Max: 36.7 (2020 17:22)  T(F): 97.6 (2020 10:18), Max: 98.1 (2020 17:22)  HR: 70 (2020 10:18) (63 - 70)  BP: 101/57 (2020 10:18) (94/50 - 111/69)  BP(mean): 84 (2020 10:18) (84 - 84)  RR: 17 (2020 10:18) (16 - 18)  SpO2: 96% (2020 10:18) (93% - 97%)           Daily     Daily Weight in k.8 (2020 05:18)  LABS:                        9.8    5.21  )-----------( 250      ( 2020 08:05 )             31.3                 139  |  104  |  22  ----------------------------<  105<H>  4.0   |  30  |  0.96    Ca    9.0      2020 08:18    TPro  6.8  /  Alb  3.2<L>  /  TBili  0.6  /  DBili  x   /  AST  13<L>  /  ALT  16  /  AlkPhos  53                         CAPILLARY BLOOD GLUCOSE          Culture - CSF with Gram Stain (collected 11-15)  Source: .CSF CSF  Gram Stain ():    No polymorphonuclear leukocytes seen    No organisms seen    by cytocentrifuge  Final Report ():    No growth      MEDICATIONS  (STANDING):  aspirin  chewable 81 milliGRAM(s) Oral daily  atorvastatin 40 milliGRAM(s) Oral at bedtime  carvedilol 3.125 milliGRAM(s) Oral every 12 hours  chlorhexidine 4% Liquid 1 Application(s) Topical daily  donepezil 5 milliGRAM(s) Oral at bedtime  furosemide    Tablet 40 milliGRAM(s) Oral daily  heparin   Injectable 5000 Unit(s) SubCutaneous every 12 hours  lisinopril 2.5 milliGRAM(s) Oral daily  melatonin 3 milliGRAM(s) Oral at bedtime  memantine 10 milliGRAM(s) Oral two times a day  nicotine -   7 mG/24Hr(s) Patch 1 patch Transdermal daily

## 2020-11-19 NOTE — PROVIDER CONTACT NOTE (OTHER) - ACTION/TREATMENT ORDERED:
None at this time
try for a peripheral IV, request for midline will be given in hand off for dayshift provider.

## 2020-11-20 LAB
HCT VFR BLD CALC: 29.7 % — LOW (ref 34.5–45)
HGB BLD-MCNC: 9.1 G/DL — LOW (ref 11.5–15.5)
MCHC RBC-ENTMCNC: 24.3 PG — LOW (ref 27–34)
MCHC RBC-ENTMCNC: 30.6 GM/DL — LOW (ref 32–36)
MCV RBC AUTO: 79.4 FL — LOW (ref 80–100)
NRBC # BLD: 0 /100 WBCS — SIGNIFICANT CHANGE UP (ref 0–0)
PLATELET # BLD AUTO: 243 K/UL — SIGNIFICANT CHANGE UP (ref 150–400)
RBC # BLD: 3.74 M/UL — LOW (ref 3.8–5.2)
RBC # FLD: 14.3 % — SIGNIFICANT CHANGE UP (ref 10.3–14.5)
WBC # BLD: 5.51 K/UL — SIGNIFICANT CHANGE UP (ref 3.8–10.5)
WBC # FLD AUTO: 5.51 K/UL — SIGNIFICANT CHANGE UP (ref 3.8–10.5)

## 2020-11-20 PROCEDURE — 99231 SBSQ HOSP IP/OBS SF/LOW 25: CPT

## 2020-11-20 PROCEDURE — 99232 SBSQ HOSP IP/OBS MODERATE 35: CPT

## 2020-11-20 RX ORDER — SODIUM CHLORIDE 9 MG/ML
500 INJECTION INTRAMUSCULAR; INTRAVENOUS; SUBCUTANEOUS ONCE
Refills: 0 | Status: COMPLETED | OUTPATIENT
Start: 2020-11-20 | End: 2020-11-20

## 2020-11-20 RX ADMIN — LISINOPRIL 2.5 MILLIGRAM(S): 2.5 TABLET ORAL at 12:24

## 2020-11-20 RX ADMIN — ATORVASTATIN CALCIUM 40 MILLIGRAM(S): 80 TABLET, FILM COATED ORAL at 21:38

## 2020-11-20 RX ADMIN — SODIUM CHLORIDE 1000 MILLILITER(S): 9 INJECTION INTRAMUSCULAR; INTRAVENOUS; SUBCUTANEOUS at 00:34

## 2020-11-20 RX ADMIN — Medication 3 MILLIGRAM(S): at 21:38

## 2020-11-20 RX ADMIN — Medication 1 PATCH: at 09:23

## 2020-11-20 RX ADMIN — Medication 40 MILLIGRAM(S): at 05:54

## 2020-11-20 RX ADMIN — HEPARIN SODIUM 5000 UNIT(S): 5000 INJECTION INTRAVENOUS; SUBCUTANEOUS at 17:56

## 2020-11-20 RX ADMIN — HEPARIN SODIUM 5000 UNIT(S): 5000 INJECTION INTRAVENOUS; SUBCUTANEOUS at 05:20

## 2020-11-20 RX ADMIN — MEMANTINE HYDROCHLORIDE 10 MILLIGRAM(S): 10 TABLET ORAL at 05:54

## 2020-11-20 RX ADMIN — Medication 1 PATCH: at 19:50

## 2020-11-20 RX ADMIN — Medication 1 PATCH: at 12:25

## 2020-11-20 RX ADMIN — Medication 81 MILLIGRAM(S): at 12:24

## 2020-11-20 RX ADMIN — MEMANTINE HYDROCHLORIDE 10 MILLIGRAM(S): 10 TABLET ORAL at 17:56

## 2020-11-20 RX ADMIN — Medication 1 PATCH: at 18:23

## 2020-11-20 RX ADMIN — DONEPEZIL HYDROCHLORIDE 5 MILLIGRAM(S): 10 TABLET, FILM COATED ORAL at 22:42

## 2020-11-20 NOTE — PROGRESS NOTE ADULT - ASSESSMENT
80 years old female with history of dementia and Hypertension and diabetes was found with wandering with cerebrovascular accident seen and LBBB with elevated trop     Acute Systolic Heart Failure/ NSTEMI:  - Echo with EF < 20 %, dilated cardiomyopathy   - S/P heparin gtt   - Continue ASA, plavix   - Continue statin  - Cardiology consulted again. Will follow Dr Adams cunningham.   - Stress test cancelled for acute CVA    Anemia:  - Microcytic anemia  - Neg occult blood  - Iron studies not consistent with iron deficient   - Hb stable, outpt follow up and work up.    Acute/subacute CVA:  - MR brain noted, possibly embolic  - CTA head and neck unremarkable  - Neurology eval appreciated   - >10 days since diagnosis of CVA.   - Continue ASA, plavix ( NIHSS score < 4, presumed small vessel disease, unless JOVAN positive), stop plavix after 21 days.    Tertiary syphilis,. 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 Dced PCN.     Essential hypertension: better now. cont current meds with holding parameters.     Anemia. no active gross bleeding.     Full code  Called and discussed with son>> he works 9-5 and cant take care of her mother. discussed with care manager>>> not safe to discharge home without any kind of help or supervision. will follow up. discharge postponed.

## 2020-11-20 NOTE — PROGRESS NOTE ADULT - ASSESSMENT
Subjective Complaints:  Historian:             Vital Signs Last 24 Hrs  T(C): 37 (20 Nov 2020 16:21), Max: 37 (20 Nov 2020 16:21)  T(F): 98.6 (20 Nov 2020 16:21), Max: 98.6 (20 Nov 2020 16:21)  HR: 71 (20 Nov 2020 16:21) (71 - 100)  BP: 109/68 (20 Nov 2020 16:21) (87/47 - 135/66)  BP(mean): --  RR: 17 (20 Nov 2020 16:21) (17 - 18)  SpO2: 99% (20 Nov 2020 16:21) (95% - 99%)    GENERAL PHYSICAL EXAM:  General:  Appears stated age, well-groomed, well-nourished, no distress  HEENT:  NC/AT, patent nares w/ pink mucosa, OP clear w/o lesions, PERRL, EOMI, conjunctivae clear, no thyromegaly, nodules, adenopathy, no JVD  Chest:  Full & symmetric excursion, no increased effort, breath sounds clear  Cardiovascular:  Regular rhythm, S1, S2, no murmur/rub/S3/S4, no carotid/femoral/abdominal bruit, radial/pedal pulses 2+, no edema  Abdomen:  Soft, non-tender, non-distended, normoactive bowel sounds, no HSM  Extremities:  Gait & station:   Digits:   Nails:   Joints, Bones, Muscles:   ROM:   Stability:  Skin:  No rash/erythema/ecchymoses/petechiae/wounds/abscess/warm/dry  Musculoskeletal:  Full ROM in all joints w/o swelling/tenderness/effusion        LABS:                        9.1    5.51  )-----------( 243      ( 20 Nov 2020 06:38 )             29.7                 RADIOLOGY & ADDITIONAL STUDIES:        Neurology Progress Note:      Mental Status: awake  alert speech fluent  folllowos commands confused       Cranial Nerves: 2 1/2 intact      Motor:   arm leg 4/5         Sensory: intact      Cerebellar: defrd      Gait: no ataxia       Assesment/Plan: cva dementia  s/p spinal tap csf vdrl negative had   pcn  on asa and lipitor

## 2020-11-20 NOTE — PROGRESS NOTE ADULT - SUBJECTIVE AND OBJECTIVE BOX
CHIEF COMPLAINT: Follow up of acute stroke/NSTEMI  received IVF last night for hypotension. but better now.  no fever      PHYSICAL EXAM:    GENERAL: Elderly pleasant   CHEST/LUNG: Clear to ausculation bilaterally, no wheezing, no crackles   HEART: Regular rate and rhythm; No murmurs, rubs  ABDOMEN: Soft, Nontender, Nondistended; Bowel sounds present  EXTREMITIES:  Moving all four extremities spontaneously, No clubbing, cyanosis,  NERVOUS SYSTEM:  able to lift her arms and make a fist bilaterally.   Psychiatry: AAO x name only        OBJECTIVE DATA:     Vital Signs Last 24 Hrs  T(C): 36.8 (2020 10:29), Max: 36.9 (2020 16:40)  T(F): 98.2 (2020 10:29), Max: 98.5 (2020 16:40)  HR: 82 (2020 10:29) (65 - 100)  BP: 135/66 (2020 10:29) (87/47 - 135/66)  BP(mean): --  RR: 18 (2020 10:29) (16 - 18)  SpO2: 97% (2020 10:29) (95% - 97%)           Daily     Daily Weight in k.2 (2020 04:59)  LABS:                        9.1    5.51  )-----------( 243      ( 2020 06:38 )             29.7                   MEDICATIONS  (STANDING):  aspirin  chewable 81 milliGRAM(s) Oral daily  atorvastatin 40 milliGRAM(s) Oral at bedtime  carvedilol 3.125 milliGRAM(s) Oral every 12 hours  chlorhexidine 4% Liquid 1 Application(s) Topical daily  donepezil 5 milliGRAM(s) Oral at bedtime  furosemide    Tablet 40 milliGRAM(s) Oral daily  heparin   Injectable 5000 Unit(s) SubCutaneous every 12 hours  lisinopril 2.5 milliGRAM(s) Oral daily  melatonin 3 milliGRAM(s) Oral at bedtime  memantine 10 milliGRAM(s) Oral two times a day  nicotine -   7 mG/24Hr(s) Patch 1 patch Transdermal daily    MEDICATIONS  (PRN):

## 2020-11-20 NOTE — PROGRESS NOTE ADULT - SUBJECTIVE AND OBJECTIVE BOX
HPI:   81yo female with unknown pmh found walking on highway. Pt gave name and . Pt is AOX2, shivering but otherwise deneis any complaints. Police currently attempting to look for family.    	No fever/chills, No photophobia/eye pain/changes in vision, No ear pain/sore throat/dysphagia, No chest pain/palpitations, no SOB/cough, no wheeze/stridor, No abdominal pain, No N/V/D, no dysuria/frequency/discharge, No neck/back pain, no rash, no changes in neurological status/function.   (2020 14:02)      Allergies    No Known Allergies    Intolerances        MEDICATIONS  (STANDING):  aspirin  chewable 81 milliGRAM(s) Oral daily  atorvastatin 40 milliGRAM(s) Oral at bedtime  carvedilol 3.125 milliGRAM(s) Oral every 12 hours  chlorhexidine 4% Liquid 1 Application(s) Topical daily  donepezil 5 milliGRAM(s) Oral at bedtime  furosemide    Tablet 40 milliGRAM(s) Oral daily  heparin   Injectable 5000 Unit(s) SubCutaneous every 12 hours  lisinopril 2.5 milliGRAM(s) Oral daily  melatonin 3 milliGRAM(s) Oral at bedtime  memantine 10 milliGRAM(s) Oral two times a day  nicotine -   7 mG/24Hr(s) Patch 1 patch Transdermal daily    MEDICATIONS  (PRN):      REVIEW OF SYSTEMS:    Alert & demented , cannot give reliable ho   ROS - negative     VITAL SIGNS:  T(C): 36.5 (20 @ 04:59), Max: 36.9 (20 @ 16:40)  T(F): 97.7 (20 @ 04:59), Max: 98.5 (20 @ 16:40)  HR: 78 (20 @ 04:59) (65 - 100)  BP: 109/66 (20 @ 04:59) (87/47 - 109/66)  RR: 18 (20 @ 04:59) (16 - 18)  SpO2: 97% (20 @ 04:59) (95% - 97%)  Wt(kg): --    PHYSICAL EXAM:    GENERAL: not in any distress  HEENT: Neck is supple, normocephalic, atraumatic   CHEST/LUNG: Clear to percussion bilaterally; No rales, rhonchi, wheezing  HEART: Regular rate and rhythm; No murmurs, rubs, or gallops  ABDOMEN: Soft, Nontender, Nondistended; Bowel sounds present, no rebound   EXTREMITIES:  2+ Peripheral Pulses, No clubbing, cyanosis, or edema  GENITOURINARY:   SKIN: No rashes or lesions  BACK: no pressor sore   NERVOUS SYSTEM:  Alert & demented     LABS:                         9.1    5.51  )-----------( 243      ( 2020 06:38 )             29.7                             Culture Results:   No growth (-15 @ 00:11)                Radiology:

## 2020-11-20 NOTE — PROGRESS NOTE ADULT - SUBJECTIVE AND OBJECTIVE BOX
Patient is a 80y old  Female who presents with a chief complaint of Altered Mental Status (11 Nov 2020 11:27)      PAST MEDICAL & SURGICAL HISTORY:      INTERVAL HISTORY: Patient in bed, confused.  	    Vitals:  Vital Signs Last 24 Hrs  T(C): 37 (20 Nov 2020 16:21), Max: 37 (20 Nov 2020 16:21)  T(F): 98.6 (20 Nov 2020 16:21), Max: 98.6 (20 Nov 2020 16:21)  HR: 71 (20 Nov 2020 16:21) (71 - 100)  BP: 109/68 (20 Nov 2020 16:21) (87/47 - 135/66)  RR: 17 (20 Nov 2020 16:21) (17 - 18)  SpO2: 99% (20 Nov 2020 16:21) (95% - 99%)      	     RADIOLOGY: < from: Xray Chest 1 View- PORTABLE-Urgent (Xray Chest 1 View- PORTABLE-Urgent .) (11.05.20 @ 14:58) >  AP chest. No priors.    Heart enlarged despite AP projection. Calcified aortic arch. Mild deviation of the upper thoracic trachea to the left likely reflects enlarged thyroid. Recommend chest CT for additional evaluation. Mild generalized nonspecific interstitial prominence. Correlate clinically for mild congestion and/or chronic interstitial lung disease. Nofocal consolidation or significant pleural effusion.    Impression: Cardiomegaly. Probable intrathoracic thyroid. Generalized nonspecific interstitial prominence.      < end of copied text >      DIAGNOSTIC TESTING:    [ x] Echocardiogram:   < from: TTE Echo Complete w/o Contrast w/ Doppler (11.05.20 @ 17:09) >  Summary:   1. Left ventricular ejection fraction, by visual estimation, is <20%.   2. Technically good study.   3. Severely decreased global left ventricular systolic function.   4. Basal and mid inferior wall and basal and mid inferolateral wall are abnormal as described above.   5. Dilated cardiomyopathy.   6. Moderately increased left ventricular internal cavity size.   7. Spectral Doppler shows impaired relaxation pattern of left ventricular myocardial filling (Grade I diastolic dysfunction).   8. Mildly reduced RV systolic function.   9. Mildly enlarged left atrium.  10. Normal right atrial size.  11. There is no evidence of pericardial effusion.  12. Mild mitral valve regurgitation.  13. Mild The mitral valve leaflets are tethered which is due to reduced systolic function and elevated LVDP.  14. Mild tricuspid regurgitation.  15. Sclerotic aortic valve with normal opening.  16.Estimated pulmonary artery systolic pressure is 38.1 mmHg assuming a right atrial pressure of 10 mmHg, which is consistent with borderline pulmonary hypertension.  17. There is mild aortic root calcification.    < end of copied text >       LABS:	 	                          9.1    5.51  )-----------( 243      ( 20 Nov 2020 06:38 )             29.7

## 2020-11-20 NOTE — PROGRESS NOTE ADULT - ASSESSMENT
Toxic metabolic encephalopathy / Dementia   CT with acute multiple strokes embolic?  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

## 2020-11-20 NOTE — PROGRESS NOTE ADULT - ASSESSMENT
ASSESSMENT:  80-year-old female with unclear medical history found along highway confused. Seen to have left bundle branch block of unclear duration and echo reveals severe global dilated cardiomyopathy, EF <20%, GR I DD. Also non-STEMI noted with peak troponin of approximately 27. Seen ambulating without chest complaints or shortness of breath. Noted to be a smoker currently on nicotine replacement patch. On 11/10/20 MRI Brain report acute/subacute infarct. Heparin infusion stopped and [facility] Stress test cancelled.    PLAN:       aspirin and statin for CV prevention.  Lisinopril and Coreg for dilated cardiomyopathy treatment.   continue Lasix for HFrEF.  Smoking cessation with nicotine replacement therapy and stay on DVT prevention.  No aggressive cardiac interventions or JOVAN at this time.  Would optimize medical management. Signing off. May have outpt cardiac f/u after acute issues improve.    Todd Lamas MD, FACC

## 2020-11-21 LAB
HCT VFR BLD CALC: 30.2 % — LOW (ref 34.5–45)
HGB BLD-MCNC: 9.3 G/DL — LOW (ref 11.5–15.5)
MCHC RBC-ENTMCNC: 24.2 PG — LOW (ref 27–34)
MCHC RBC-ENTMCNC: 30.8 GM/DL — LOW (ref 32–36)
MCV RBC AUTO: 78.6 FL — LOW (ref 80–100)
NRBC # BLD: 0 /100 WBCS — SIGNIFICANT CHANGE UP (ref 0–0)
PLATELET # BLD AUTO: 251 K/UL — SIGNIFICANT CHANGE UP (ref 150–400)
RBC # BLD: 3.84 M/UL — SIGNIFICANT CHANGE UP (ref 3.8–5.2)
RBC # FLD: 14.2 % — SIGNIFICANT CHANGE UP (ref 10.3–14.5)
WBC # BLD: 5.17 K/UL — SIGNIFICANT CHANGE UP (ref 3.8–10.5)
WBC # FLD AUTO: 5.17 K/UL — SIGNIFICANT CHANGE UP (ref 3.8–10.5)

## 2020-11-21 PROCEDURE — 99232 SBSQ HOSP IP/OBS MODERATE 35: CPT

## 2020-11-21 RX ADMIN — Medication 1 PATCH: at 11:52

## 2020-11-21 RX ADMIN — LISINOPRIL 2.5 MILLIGRAM(S): 2.5 TABLET ORAL at 05:22

## 2020-11-21 RX ADMIN — Medication 1 PATCH: at 11:51

## 2020-11-21 RX ADMIN — Medication 1 PATCH: at 19:05

## 2020-11-21 RX ADMIN — Medication 1 PATCH: at 07:39

## 2020-11-21 RX ADMIN — HEPARIN SODIUM 5000 UNIT(S): 5000 INJECTION INTRAVENOUS; SUBCUTANEOUS at 05:22

## 2020-11-21 RX ADMIN — Medication 40 MILLIGRAM(S): at 05:22

## 2020-11-21 RX ADMIN — Medication 81 MILLIGRAM(S): at 11:52

## 2020-11-21 RX ADMIN — CHLORHEXIDINE GLUCONATE 1 APPLICATION(S): 213 SOLUTION TOPICAL at 11:52

## 2020-11-21 RX ADMIN — CARVEDILOL PHOSPHATE 3.12 MILLIGRAM(S): 80 CAPSULE, EXTENDED RELEASE ORAL at 05:22

## 2020-11-21 RX ADMIN — DONEPEZIL HYDROCHLORIDE 5 MILLIGRAM(S): 10 TABLET, FILM COATED ORAL at 21:06

## 2020-11-21 RX ADMIN — HEPARIN SODIUM 5000 UNIT(S): 5000 INJECTION INTRAVENOUS; SUBCUTANEOUS at 17:12

## 2020-11-21 RX ADMIN — Medication 3 MILLIGRAM(S): at 21:06

## 2020-11-21 RX ADMIN — MEMANTINE HYDROCHLORIDE 10 MILLIGRAM(S): 10 TABLET ORAL at 05:22

## 2020-11-21 RX ADMIN — MEMANTINE HYDROCHLORIDE 10 MILLIGRAM(S): 10 TABLET ORAL at 17:12

## 2020-11-21 RX ADMIN — ATORVASTATIN CALCIUM 40 MILLIGRAM(S): 80 TABLET, FILM COATED ORAL at 21:06

## 2020-11-21 NOTE — PROGRESS NOTE ADULT - SUBJECTIVE AND OBJECTIVE BOX
CHIEF COMPLAINT: Follow up of acute stroke/NSTEMI  no overnight events  + BM today  no diarrhea  no fever no n/v/sore throat       PHYSICAL EXAM:    GENERAL: Elderly pleasant   CHEST/LUNG: Clear to ausculation bilaterally, no wheezing, no crackles   HEART: Regular rate and rhythm; No murmurs, rubs  ABDOMEN: Soft, Nontender, Nondistended; Bowel sounds present  EXTREMITIES:  Moving all four extremities spontaneously, No clubbing, cyanosis,  NERVOUS SYSTEM:  able to lift her arms and make a fist bilaterally.   Psychiatry: AAO x name only        OBJECTIVE DATA:       Vital Signs Last 24 Hrs  T(C): 36.7 (2020 11:13), Max: 37 (2020 16:21)  T(F): 98.1 (2020 11:13), Max: 98.6 (2020 16:21)  HR: 70 (2020 11:13) (70 - 78)  BP: 103/68 (2020 11:13) (103/68 - 144/76)  BP(mean): --  RR: 18 (2020 11:13) (17 - 18)  SpO2: 97% (2020 11:13) (95% - 99%)           Daily     Daily Weight in k (2020 05:04)  LABS:                        9.3    5.17  )-----------( 251      ( 2020 07:16 )             30.2                                      CAPILLARY BLOOD GLUCOSE      MEDICATIONS  (STANDING):  aspirin  chewable 81 milliGRAM(s) Oral daily  atorvastatin 40 milliGRAM(s) Oral at bedtime  carvedilol 3.125 milliGRAM(s) Oral every 12 hours  chlorhexidine 4% Liquid 1 Application(s) Topical daily  donepezil 5 milliGRAM(s) Oral at bedtime  furosemide    Tablet 40 milliGRAM(s) Oral daily  heparin   Injectable 5000 Unit(s) SubCutaneous every 12 hours  lisinopril 2.5 milliGRAM(s) Oral daily  melatonin 3 milliGRAM(s) Oral at bedtime  memantine 10 milliGRAM(s) Oral two times a day  nicotine -   7 mG/24Hr(s) Patch 1 patch Transdermal daily

## 2020-11-21 NOTE — PROGRESS NOTE ADULT - ASSESSMENT
Subjective Complaints:  Historian:             Vital Signs Last 24 Hrs  T(C): 37.2 (21 Nov 2020 16:14), Max: 37.2 (21 Nov 2020 16:14)  T(F): 98.9 (21 Nov 2020 16:14), Max: 98.9 (21 Nov 2020 16:14)  HR: 78 (21 Nov 2020 16:14) (70 - 78)  BP: 105/68 (21 Nov 2020 16:14) (103/68 - 144/76)  BP(mean): --  RR: 17 (21 Nov 2020 16:14) (17 - 18)  SpO2: 100% (21 Nov 2020 16:14) (95% - 100%)    GENERAL PHYSICAL EXAM:  General:  Appears stated age, well-groomed, well-nourished, no distress  HEENT:  NC/AT, patent nares w/ pink mucosa, OP clear w/o lesions, PERRL, EOMI, conjunctivae clear, no thyromegaly, nodules, adenopathy, no JVD  Chest:  Full & symmetric excursion, no increased effort, breath sounds clear  Cardiovascular:  Regular rhythm, S1, S2, no murmur/rub/S3/S4, no carotid/femoral/abdominal bruit, radial/pedal pulses 2+, no edema  Abdomen:  Soft, non-tender, non-distended, normoactive bowel sounds, no HSM  Extremities:  Gait & station:   Digits:   Nails:   Joints, Bones, Muscles:   ROM:   Stability:  Skin:  No rash/erythema/ecchymoses/petechiae/wounds/abscess/warm/dry  Musculoskeletal:  Full ROM in all joints w/o swelling/tenderness/effusion        LABS:                        9.3    5.17  )-----------( 251      ( 21 Nov 2020 07:16 )             30.2                 RADIOLOGY & ADDITIONAL STUDIES:        Neurology Progress Note:      Mental Status: awaek alert speech fluent  confused       Cranial Nerves: 2 1/2 intact      Motor:   arm leg 4/5         Sensory: intact      Cerebellar:defrd       Gait: unsteady       Assesment/Plan: cva dementia s/p spinal tap vdrl csf negative  for sub acute rehab no seizure

## 2020-11-21 NOTE — PROGRESS NOTE ADULT - ASSESSMENT
80 years old female with history of dementia and Hypertension and diabetes was found with wandering with cerebrovascular accident seen and LBBB with elevated trop     Acute Systolic Heart Failure/ NSTEMI:  - Echo with EF < 20 %, dilated cardiomyopathy   - S/P heparin gtt   - Continue ASA, plavix   - Continue statin  - No plan for JOVAN per cards.   - Stress test cancelled for acute CVA  -DC Lisinopril because of risk of hypotension.     Anemia:  - Microcytic anemia  - Neg occult blood  - Iron studies not consistent with iron deficient   - Hb stable, outpt follow up and work up.    Acute/subacute CVA:  - MR brain noted, possibly embolic  - CTA head and neck unremarkable  - Neurology eval appreciated   - >10 days since diagnosis of CVA.   - Continue ASA, plavix ( NIHSS score < 4, presumed small vessel disease, unless JOVAN positive), stop plavix after 21 days.    Tertiary syphilis,. 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 Dced PCN.     Essential hypertension: better now. cont current meds with holding parameters.     Anemia. no active gross bleeding.     Full code  discharge barrier: Patient is medically ready for discharge but patient's son is not willing to take her home until he gets 24 hour home care. Care manager and  aware of it.

## 2020-11-22 LAB
ANION GAP SERPL CALC-SCNC: 5 MMOL/L — SIGNIFICANT CHANGE UP (ref 5–17)
BUN SERPL-MCNC: 30 MG/DL — HIGH (ref 7–23)
CALCIUM SERPL-MCNC: 9.1 MG/DL — SIGNIFICANT CHANGE UP (ref 8.5–10.1)
CHLORIDE SERPL-SCNC: 104 MMOL/L — SIGNIFICANT CHANGE UP (ref 96–108)
CO2 SERPL-SCNC: 29 MMOL/L — SIGNIFICANT CHANGE UP (ref 22–31)
CREAT SERPL-MCNC: 0.82 MG/DL — SIGNIFICANT CHANGE UP (ref 0.5–1.3)
GLUCOSE SERPL-MCNC: 85 MG/DL — SIGNIFICANT CHANGE UP (ref 70–99)
POTASSIUM SERPL-MCNC: 3.8 MMOL/L — SIGNIFICANT CHANGE UP (ref 3.5–5.3)
POTASSIUM SERPL-SCNC: 3.8 MMOL/L — SIGNIFICANT CHANGE UP (ref 3.5–5.3)
SODIUM SERPL-SCNC: 138 MMOL/L — SIGNIFICANT CHANGE UP (ref 135–145)

## 2020-11-22 PROCEDURE — 99232 SBSQ HOSP IP/OBS MODERATE 35: CPT

## 2020-11-22 RX ADMIN — MEMANTINE HYDROCHLORIDE 10 MILLIGRAM(S): 10 TABLET ORAL at 17:33

## 2020-11-22 RX ADMIN — HEPARIN SODIUM 5000 UNIT(S): 5000 INJECTION INTRAVENOUS; SUBCUTANEOUS at 17:33

## 2020-11-22 RX ADMIN — Medication 1 PATCH: at 14:45

## 2020-11-22 RX ADMIN — Medication 40 MILLIGRAM(S): at 05:20

## 2020-11-22 RX ADMIN — Medication 1 PATCH: at 19:34

## 2020-11-22 RX ADMIN — Medication 81 MILLIGRAM(S): at 11:30

## 2020-11-22 RX ADMIN — MEMANTINE HYDROCHLORIDE 10 MILLIGRAM(S): 10 TABLET ORAL at 05:20

## 2020-11-22 RX ADMIN — Medication 1 PATCH: at 11:28

## 2020-11-22 RX ADMIN — ATORVASTATIN CALCIUM 40 MILLIGRAM(S): 80 TABLET, FILM COATED ORAL at 21:21

## 2020-11-22 RX ADMIN — Medication 3 MILLIGRAM(S): at 21:22

## 2020-11-22 RX ADMIN — HEPARIN SODIUM 5000 UNIT(S): 5000 INJECTION INTRAVENOUS; SUBCUTANEOUS at 05:20

## 2020-11-22 RX ADMIN — CHLORHEXIDINE GLUCONATE 1 APPLICATION(S): 213 SOLUTION TOPICAL at 11:29

## 2020-11-22 RX ADMIN — Medication 1 PATCH: at 07:59

## 2020-11-22 RX ADMIN — DONEPEZIL HYDROCHLORIDE 5 MILLIGRAM(S): 10 TABLET, FILM COATED ORAL at 21:22

## 2020-11-22 NOTE — PROGRESS NOTE ADULT - SUBJECTIVE AND OBJECTIVE BOX
CHIEF COMPLAINT: Follow up of acute stroke/NSTEMI  no overnight events  no diarrhea  no fever no n/v/sore throat       PHYSICAL EXAM:    GENERAL: Elderly pleasant   CHEST/LUNG: Clear to ausculation bilaterally, no wheezing, no crackles   HEART: Regular rate and rhythm; No murmurs, rubs  ABDOMEN: Soft, Nontender, Nondistended; Bowel sounds present  EXTREMITIES:  Moving all four extremities spontaneously, No clubbing, cyanosis,  NERVOUS SYSTEM:  able to lift her arms and make a fist bilaterally.   Psychiatry: AAO x name only        OBJECTIVE DATA:     Vital Signs Last 24 Hrs  T(C): 36.1 (2020 10:31), Max: 37.2 (2020 16:14)  T(F): 97 (2020 10:31), Max: 98.9 (2020 16:14)  HR: 79 (2020 10:31) (70 - 79)  BP: 109/68 (2020 10:31) (103/68 - 123/70)  BP(mean): 70 (2020 23:33) (70 - 70)  RR: 18 (2020 10:31) (17 - 18)  SpO2: 97% (2020 10:31) (96% - 100%)           Daily     Daily Weight in k.6 (2020 05:21)  LABS:                        9.3    5.17  )-----------( 251      ( 2020 07:16 )             30.2             11-22    138  |  104  |  30<H>  ----------------------------<  85  3.8   |  29  |  0.82    Ca    9.1      2020 06:58                      MEDICATIONS  (STANDING):  aspirin  chewable 81 milliGRAM(s) Oral daily  atorvastatin 40 milliGRAM(s) Oral at bedtime  carvedilol 3.125 milliGRAM(s) Oral every 12 hours  chlorhexidine 4% Liquid 1 Application(s) Topical daily  donepezil 5 milliGRAM(s) Oral at bedtime  furosemide    Tablet 40 milliGRAM(s) Oral daily  heparin   Injectable 5000 Unit(s) SubCutaneous every 12 hours  melatonin 3 milliGRAM(s) Oral at bedtime  memantine 10 milliGRAM(s) Oral two times a day  nicotine -   7 mG/24Hr(s) Patch 1 patch Transdermal daily    MEDICATIONS  (PRN):

## 2020-11-22 NOTE — PROGRESS NOTE ADULT - ASSESSMENT
80 years old female with history of dementia and Hypertension and diabetes was found with wandering with cerebrovascular accident seen and LBBB with elevated trop     Acute Systolic Heart Failure/ NSTEMI:  - Echo with EF < 20 %, dilated cardiomyopathy   - S/P heparin gtt   - Continue ASA, plavix   - Continue statin  - No plan for JOVAN per cards.   - Stress test cancelled for acute CVA  -Off Lisinopril because of risk of hypotension.     Anemia:  - Microcytic anemia  - Neg occult blood  - Iron studies not consistent with iron deficient   - Hb stable, outpt follow up and work up.    Acute/subacute CVA:  - MR brain noted, possibly embolic  - CTA head and neck unremarkable  - Neurology eval appreciated   - >10 days since diagnosis of CVA.   - Continue ASA, plavix ( NIHSS score < 4, presumed small vessel disease, unless JOVAN positive), stop plavix after 21 days.    Tertiary syphilis,. 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 Dced PCN.     Essential hypertension: better. cont current meds with holding parameters.     Anemia. no active gross bleeding.     Full code  discharge barrier: Patient is medically ready for discharge but patient's son is not willing to take her home until he gets 24 hour home care. Care manager and  aware of it.

## 2020-11-22 NOTE — PROGRESS NOTE ADULT - ASSESSMENT
Subjective Complaints:  Historian:             Vital Signs Last 24 Hrs  T(C): 36.9 (22 Nov 2020 16:18), Max: 36.9 (21 Nov 2020 23:33)  T(F): 98.5 (22 Nov 2020 16:18), Max: 98.5 (21 Nov 2020 23:33)  HR: 84 (22 Nov 2020 16:18) (73 - 84)  BP: 108/70 (22 Nov 2020 16:18) (107/70 - 123/70)  BP(mean): 70 (21 Nov 2020 23:33) (70 - 70)  RR: 17 (22 Nov 2020 16:18) (17 - 18)  SpO2: 97% (22 Nov 2020 16:18) (96% - 98%)    GENERAL PHYSICAL EXAM:  General:  Appears stated age, well-groomed, well-nourished, no distress  HEENT:  NC/AT, patent nares w/ pink mucosa, OP clear w/o lesions, PERRL, EOMI, conjunctivae clear, no thyromegaly, nodules, adenopathy, no JVD  Chest:  Full & symmetric excursion, no increased effort, breath sounds clear  Cardiovascular:  Regular rhythm, S1, S2, no murmur/rub/S3/S4, no carotid/femoral/abdominal bruit, radial/pedal pulses 2+, no edema  Abdomen:  Soft, non-tender, non-distended, normoactive bowel sounds, no HSM  Extremities:  Gait & station:   Digits:   Nails:   Joints, Bones, Muscles:   ROM:   Stability:  Skin:  No rash/erythema/ecchymoses/petechiae/wounds/abscess/warm/dry  Musculoskeletal:  Full ROM in all joints w/o swelling/tenderness/effusion        LABS:                        9.3    5.17  )-----------( 251      ( 21 Nov 2020 07:16 )             30.2     11-22    138  |  104  |  30<H>  ----------------------------<  85  3.8   |  29  |  0.82    Ca    9.1      22 Nov 2020 06:58            RADIOLOGY & ADDITIONAL STUDIES:        Neurology Progress Note:      Mental Status: awaek alert speech fluent confused  dementia       Cranial Nerves: 2 1/2 intact      Motor:   arm leg 4/5         Sensory: inatct      Cerebellar: defrd      Gait: unsteady       Assesment/Plan: s/p cva dementia csf vdrl negative had pcn  for sub acute rehab no seizure

## 2020-11-23 PROCEDURE — 99231 SBSQ HOSP IP/OBS SF/LOW 25: CPT

## 2020-11-23 RX ADMIN — HEPARIN SODIUM 5000 UNIT(S): 5000 INJECTION INTRAVENOUS; SUBCUTANEOUS at 17:05

## 2020-11-23 RX ADMIN — CHLORHEXIDINE GLUCONATE 1 APPLICATION(S): 213 SOLUTION TOPICAL at 12:22

## 2020-11-23 RX ADMIN — Medication 1 PATCH: at 16:23

## 2020-11-23 RX ADMIN — HEPARIN SODIUM 5000 UNIT(S): 5000 INJECTION INTRAVENOUS; SUBCUTANEOUS at 05:21

## 2020-11-23 RX ADMIN — MEMANTINE HYDROCHLORIDE 10 MILLIGRAM(S): 10 TABLET ORAL at 17:05

## 2020-11-23 RX ADMIN — Medication 40 MILLIGRAM(S): at 05:21

## 2020-11-23 RX ADMIN — Medication 3 MILLIGRAM(S): at 21:20

## 2020-11-23 RX ADMIN — Medication 81 MILLIGRAM(S): at 12:24

## 2020-11-23 RX ADMIN — Medication 1 PATCH: at 20:29

## 2020-11-23 RX ADMIN — DONEPEZIL HYDROCHLORIDE 5 MILLIGRAM(S): 10 TABLET, FILM COATED ORAL at 21:20

## 2020-11-23 RX ADMIN — Medication 1 PATCH: at 12:24

## 2020-11-23 RX ADMIN — MEMANTINE HYDROCHLORIDE 10 MILLIGRAM(S): 10 TABLET ORAL at 05:21

## 2020-11-23 RX ADMIN — ATORVASTATIN CALCIUM 40 MILLIGRAM(S): 80 TABLET, FILM COATED ORAL at 21:20

## 2020-11-23 RX ADMIN — CARVEDILOL PHOSPHATE 3.12 MILLIGRAM(S): 80 CAPSULE, EXTENDED RELEASE ORAL at 05:21

## 2020-11-23 NOTE — OCCUPATIONAL THERAPY INITIAL EVALUATION ADULT - ADDITIONAL COMMENTS
Pt is unreliable historian. Attempted to call patient's son from EMR contact #[phone], no answer at this time. As per social work/case management notes, pt lives alone in apartment with elevator access.     **OT/PT discussed case with Social Armani Cardenas, who reports she has been in contact with pt's son. Pt is recommended for home with OT services & 24/7 supervision due to pt's decreased safety awareness. Social Armani Cradenas in agreement & aware.

## 2020-11-23 NOTE — PHYSICAL THERAPY INITIAL EVALUATION ADULT - BALANCE TRAINING, PT EVAL
GOAL: Patient will demonstrate independent performance of ADLS c low falls risk with appropriate mobility/adaptive devices, with adherence to orthopedic precautions, in 4 weeks.
Pt will be independent in sitting, transfers, standing and ambulation with good balance using appropriate assistive device and prevent falls in 2 weeks.

## 2020-11-23 NOTE — PHYSICAL THERAPY INITIAL EVALUATION ADULT - FOLLOWS COMMANDS/ANSWERS QUESTIONS, REHAB EVAL
inconsistently follows multi-step instructions; requires repetition
able to follow multistep instructions

## 2020-11-23 NOTE — OCCUPATIONAL THERAPY INITIAL EVALUATION ADULT - GENERAL OBSERVATIONS, REHAB EVAL
Pt was encountered supine in bed; NAD, AXOX2 (unaware of year & situation), confused; no complaints of pain.

## 2020-11-23 NOTE — PHYSICAL THERAPY INITIAL EVALUATION ADULT - ADDITIONAL COMMENTS
...(continued), MRI on 11/10/20: acute and/or recent subacute infarct in the vertebrobasilar system; infarcts in both occipital lobes & the cerebellum; small recent infarct in the right frontal white matter, suggests embolic or thrombotic showering. Neurology following. Cardiology followed and signed off--medical interventions.   Attempted to call son Saul and Emergency contacts, unsuccessful.  Per  phonecall to son on 11/10 by  Rae --patient used to be homeless and now had been living at [facility]. She lives alone and has no stairs to negotiate. She was reportedly independent in mobility and ADLs prior. Son endorsed she has dementia, and he had been trying to get her an aide. He visits her every other day.

## 2020-11-23 NOTE — PHYSICAL THERAPY INITIAL EVALUATION ADULT - GAIT TRAINING, PT EVAL
GOAL: Patient will demonstrate independent and safe gait indoors with appropriate mobility/adaptive devices for =/> 500 feet, in 4 weeks.
Pt will independently ambulate 150 feet with rolling walker without loss of balance, by 2 weeks.

## 2020-11-23 NOTE — PHYSICAL THERAPY INITIAL EVALUATION ADULT - DISCHARGE DISPOSITION, PT EVAL
Home c home PT to improve balance, strength, safety awareness and endurance. Patient will require 24 hour/7 days supervision due to cognitive impairments from dementia, furthered by acute CVA.

## 2020-11-23 NOTE — PHYSICAL THERAPY INITIAL EVALUATION ADULT - GAIT DEVIATIONS NOTED, PT EVAL
mild path deviation but generally stable gait/decreased lidia
decreased stride length/decreased lidia/decreased step length

## 2020-11-23 NOTE — OCCUPATIONAL THERAPY INITIAL EVALUATION ADULT - TRANSFER TRAINING, PT EVAL
Pt will perform all functional transfers with supervision to increase safety and performance with ADLs in 2 weeks.

## 2020-11-23 NOTE — OCCUPATIONAL THERAPY INITIAL EVALUATION ADULT - OTHER, REHAB EVAL
Pt Fugl French UE Test scored 54/66 for motor function to right UE, demonstrating mild impairments with coordination & speed, however, pt is able to utilize UE functionally. Pt scored 60/60 on sensation, PROM & pain to right UE.

## 2020-11-23 NOTE — OCCUPATIONAL THERAPY INITIAL EVALUATION ADULT - COGNITIVE, VISUAL PERCEPTUAL, OT EVAL
Pt will identify 10 scenarios as safe or unsafe with 90% accuracy to increase safety with ADLs in 2 weeks.

## 2020-11-23 NOTE — PROGRESS NOTE ADULT - ASSESSMENT
Subjective Complaints:  Historian:             Vital Signs Last 24 Hrs  T(C): 36.8 (23 Nov 2020 16:08), Max: 36.8 (23 Nov 2020 16:08)  T(F): 98.2 (23 Nov 2020 16:08), Max: 98.2 (23 Nov 2020 16:08)  HR: 74 (23 Nov 2020 16:08) (60 - 75)  BP: 106/69 (23 Nov 2020 16:08) (106/69 - 123/71)  BP(mean): --  RR: 17 (23 Nov 2020 16:08) (17 - 18)  SpO2: 98% (23 Nov 2020 16:08) (94% - 99%)    GENERAL PHYSICAL EXAM:  General:  Appears stated age, well-groomed, well-nourished, no distress  HEENT:  NC/AT, patent nares w/ pink mucosa, OP clear w/o lesions, PERRL, EOMI, conjunctivae clear, no thyromegaly, nodules, adenopathy, no JVD  Chest:  Full & symmetric excursion, no increased effort, breath sounds clear  Cardiovascular:  Regular rhythm, S1, S2, no murmur/rub/S3/S4, no carotid/femoral/abdominal bruit, radial/pedal pulses 2+, no edema  Abdomen:  Soft, non-tender, non-distended, normoactive bowel sounds, no HSM  Extremities:  Gait & station:   Digits:   Nails:   Joints, Bones, Muscles:   ROM:   Stability:  Skin:  No rash/erythema/ecchymoses/petechiae/wounds/abscess/warm/dry  Musculoskeletal:  Full ROM in all joints w/o swelling/tenderness/effusion        LABS:    11-22    138  |  104  |  30<H>  ----------------------------<  85  3.8   |  29  |  0.82    Ca    9.1      22 Nov 2020 06:58            RADIOLOGY & ADDITIONAL STUDIES:        Neurology Progress Note:      Mental Status: awaekmalert speech fluent confused       Cranial Nerves: 2 1/2 intact      Motor:   arm leg 4/5         Sensory: intact      Cerebellar: defrd      Gait: unsteady       Assesment/Plan: cva dementia confused csf vdrl negative had pcn  for sub acute rehab no seizure

## 2020-11-23 NOTE — PHYSICAL THERAPY INITIAL EVALUATION ADULT - PLANNED THERAPY INTERVENTIONS, PT EVAL
balance training/neuromuscular re-education/strengthening/gait training
transfer training/gait training/strengthening/balance training

## 2020-11-23 NOTE — PROGRESS NOTE ADULT - SUBJECTIVE AND OBJECTIVE BOX
CHIEF COMPLAINT: no report of any diarrhea  no active gross bleeding  no vomiting  no fever   no new gross focal deficit  (limited ROS because of dementia)      PHYSICAL EXAM:    GENERAL: Elderly pleasant   CHEST/LUNG: Clear to ausculation bilaterally, no wheezing, no crackles   HEART: Regular rate and rhythm; No murmurs, rubs  ABDOMEN: Soft, Nontender, Nondistended; Bowel sounds present  EXTREMITIES:  Moving all four extremities spontaneously, No clubbing, cyanosis,  NERVOUS SYSTEM:  able to lift her arms and make a fist bilaterally.   Psychiatry: AAO x name only        OBJECTIVE DATA:       Vital Signs Last 24 Hrs  T(C): 36.6 (2020 05:01), Max: 36.9 (2020 16:18)  T(F): 97.9 (2020 05:), Max: 98.5 (2020 16:18)  HR: 60 (2020 05:) (60 - 84)  BP: 123/71 (2020 05:) (108/70 - 123/71)  BP(mean): --  RR: 18 (2020 05:) (17 - 18)  SpO2: 99% (2020 05:01) (97% - 99%)           Daily     Daily Weight in k.8 (2020 05:01)  LABS:                138  |  104  |  30<H>  ----------------------------<  85  3.8   |  29  |  0.82    Ca    9.1      2020 06:58                      MEDICATIONS  (STANDING):  aspirin  chewable 81 milliGRAM(s) Oral daily  atorvastatin 40 milliGRAM(s) Oral at bedtime  carvedilol 3.125 milliGRAM(s) Oral every 12 hours  chlorhexidine 4% Liquid 1 Application(s) Topical daily  donepezil 5 milliGRAM(s) Oral at bedtime  furosemide    Tablet 40 milliGRAM(s) Oral daily  heparin   Injectable 5000 Unit(s) SubCutaneous every 12 hours  melatonin 3 milliGRAM(s) Oral at bedtime  memantine 10 milliGRAM(s) Oral two times a day  nicotine -   7 mG/24Hr(s) Patch 1 patch Transdermal daily

## 2020-11-23 NOTE — PHYSICAL THERAPY INITIAL EVALUATION ADULT - PERTINENT HX OF CURRENT PROBLEM, REHAB EVAL
Patient admitted to [facility]ED in 11/5/20, [place]. Patient was only alert and oriented x 2 c incoherent history on questioning. [employer] Stroke Scale Score 0/42 in the ED. LBBB on ECG. CT Head: showing possible subacute vs chronic stroke; Troponin noted to be 24. PT evaluation on 11/6/20. (Continued...)
Pt is a 80-y/o, female, admitted after being found walking in the highway. Police is looking for family. Pt found to have altered mental status.

## 2020-11-23 NOTE — PROGRESS NOTE ADULT - ASSESSMENT
80 years old female with history of dementia and Hypertension and diabetes was found with wandering with cerebrovascular accident seen and LBBB with elevated trop     Acute Systolic Heart Failure/ NSTEMI:  - Echo with EF < 20 %, dilated cardiomyopathy   - S/P heparin gtt   - Continue ASA, plavix   - Continue statin  - No plan for JOVAN per cards.   - Stress test cancelled for acute CVA  -Off Lisinopril because of risk of hypotension.     Anemia:  - Microcytic anemia  - Neg occult blood  - Iron studies not consistent with iron deficient   - Hb stable, outpt follow up and work up.    Acute/subacute CVA:  - MR brain noted, possibly embolic  - CTA head and neck unremarkable  - Neurology eval appreciated   - >10 days since diagnosis of CVA.   - Continue ASA, plavix ( NIHSS score < 4, presumed small vessel disease, unless JOVAN positive), stop plavix on 11/28/20.     Tertiary syphilis,. 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 Dced PCN.     Essential hypertension: better. cont current meds with holding parameters.     Anemia. no active gross bleeding.     Full code  discharge barrier: Patient is medically ready for discharge but patient's son is not willing to take her home until he gets 24 hour home care. Care manager and  aware of it.      80 years old female with history of dementia and Hypertension and diabetes was found with wandering with cerebrovascular accident seen and LBBB with elevated trop     Acute Systolic Heart Failure/ NSTEMI:  - Echo with EF < 20 %, dilated cardiomyopathy   - S/P heparin gtt   - Continue ASA, plavix   - Continue statin  - No plan for JOVAN per cards.   - Stress test cancelled for acute CVA  -Off Lisinopril because of risk of hypotension.     Anemia:  - Microcytic anemia  - Neg occult blood  - Iron studies not consistent with iron deficient   - Hb stable, outpt follow up and work up.    Acute/subacute CVA:  - MR brain noted, possibly embolic  - CTA head and neck unremarkable  - Neurology eval appreciated   - >10 days since diagnosis of CVA.   - Continue ASA, plavix ( NIHSS score < 4, presumed small vessel disease, unless JOVAN positive), stop plavix on 11/28/20.     Tertiary syphilis,. 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 Dced PCN.     Essential hypertension: better. cont current meds with holding parameters.     Anemia. no active gross bleeding.     Advanced dementia. Patient has advanced dementia and not able to take care of herself at home. Patient needs 24/7 supervision at home for safe discharge.     Full code  discharge barrier: Patient is medically ready for discharge but patient's son is not willing to take her home until he gets 24 hour home care. Care manager and  aware of it.

## 2020-11-23 NOTE — PROGRESS NOTE ADULT - ASSESSMENT
Patient is being seen for AMS/ Toxic metabolic encephalopathy / Dementia   concern of CNS infection?  s/p LP  CSF analysis no significant for bacterial infection  CSF cx: negative  CSF VDRL: Negative  CSF Crypto: Negative  CT with acute multiple strokes embolic?     positive RPR   RPR Titer: 1:2  patient is following with [facility], she known to have dementia, no ho of hiv, recently diagnosed with covid , s/p treated for latent syphilis with 3 shots for 3 consecutive weeks in 2018   Covid ab pos     TTE: no vegetations but dilated cardiomyopathy   blood culture neg   fu EEG     Patient was on empiric tx for neurosyphilis, but CSF VDRL came back negative.  Also patient was treated for Late latent syphilis on 2018  s/p penicillin (11/11-11/19)  monitor off antibiotics     discharging as per primary team   Patient is being seen for AMS/ Toxic metabolic encephalopathy / Dementia   concern of CNS infection?  s/p LP  CSF analysis no significant for bacterial infection  CSF cx: negative  CSF VDRL: Negative  CSF Crypto: Negative  CT with acute multiple strokes embolic?     positive RPR   RPR Titer: 1:2  patient is following with [facility], she known to have dementia, no ho of hiv, recently diagnosed with covid , s/p treated for latent syphilis with 3 shots for 3 consecutive weeks in 2018   Covid ab pos     TTE: no vegetations but dilated cardiomyopathy   blood culture neg   fu EEG     Patient was on empiric tx for neurosyphilis, but CSF VDRL came back negative.  Also patient was treated for Late latent syphilis on 2018  s/p penicillin (11/11-11/19)    stable off antibiotics  will sign off the case, please reconsult if needed  Thank You

## 2020-11-23 NOTE — OCCUPATIONAL THERAPY INITIAL EVALUATION ADULT - PERTINENT HX OF CURRENT PROBLEM, REHAB EVAL
Pt admitted from ED by police due to confusion & disorientation after being found wandering on the highway.  CT Head "Redemonstration of acute/subacute infarcts in the bilateral occipital lobes and right cerebellum."

## 2020-11-23 NOTE — PHYSICAL THERAPY INITIAL EVALUATION ADULT - STRENGTHENING, PT EVAL
GOAL: To demonstrate gross muscle strength of 4/5 or greater to both lower limbs, to enable transfers and gait across all surfaces, elevated and flat, without undue fatigue or falls risk but with good lower limb control and balance.
Pt will increase both UE/LE strength to 4/5 to improve functional mobility by 2 weeks.

## 2020-11-23 NOTE — PHYSICAL THERAPY INITIAL EVALUATION ADULT - IMPAIRMENTS FOUND, PT EVAL
cognitive impairment/poor safety awareness/muscle strength/gait, locomotion, and balance
muscle strength/poor safety awareness/gait, locomotion, and balance

## 2020-11-23 NOTE — PHYSICAL THERAPY INITIAL EVALUATION ADULT - GENERAL OBSERVATIONS, REHAB EVAL
Patient sat up in bed, leg out of bed rail. PIV lock. Seen c OTR/RUPA Rm, MIKO Robb and SPT Tequila.
Pt encountered supine in bed, +IV heparin drip

## 2020-11-23 NOTE — PROGRESS NOTE ADULT - SUBJECTIVE AND OBJECTIVE BOX
HPI:   79yo female with unknown pmh found walking on highway. Pt gave name and . Pt is AOX2, shivering but otherwise deneis any complaints. Police currently attempting to look for family.    No fever/chills, No photophobia/eye pain/changes in vision, No ear pain/sore throat/dysphagia, No chest pain/palpitations, no SOB/cough, no wheeze/stridor, No abdominal pain, No N/V/D, no dysuria/frequency/discharge, No neck/back pain, no rash, no changes in neurological status/function.   (2020 14:02)      Allergies    No Known Allergies    Intolerances        MEDICATIONS  (STANDING):  aspirin  chewable 81 milliGRAM(s) Oral daily  atorvastatin 40 milliGRAM(s) Oral at bedtime  carvedilol 3.125 milliGRAM(s) Oral every 12 hours  chlorhexidine 4% Liquid 1 Application(s) Topical daily  donepezil 5 milliGRAM(s) Oral at bedtime  furosemide    Tablet 40 milliGRAM(s) Oral daily  heparin   Injectable 5000 Unit(s) SubCutaneous every 12 hours  melatonin 3 milliGRAM(s) Oral at bedtime  memantine 10 milliGRAM(s) Oral two times a day  nicotine -   7 mG/24Hr(s) Patch 1 patch Transdermal daily    MEDICATIONS  (PRN):      REVIEW OF SYSTEMS:    CONSTITUTIONAL: No fever, chills, weight loss, or fatigue  HEENT: No sore throat, runny nose, ear ache  RESPIRATORY: No cough, wheezing, No shortness of breath  CARDIOVASCULAR: No chest pain, palpitations, dizziness  GASTROINTESTINAL: No abdominal pain. No nausea, vomiting, diarrhea  GENITOURINARY: No dysuria, increase frequency, hematuria, or incontinence  NEUROLOGICAL: No headaches, memory loss, loss of strength, numbness, or tremors, no weakness  EXTREMITY: No pedal edema BLE  SKIN: No itching, burning, rashes, or lesions     VITAL SIGNS:  T(C): 36.6 (20 @ 05:01), Max: 36.9 (20 @ 16:18)  T(F): 97.9 (20 @ 05:01), Max: 98.5 (20 @ 16:18)  HR: 60 (20 @ 05:01) (60 - 84)  BP: 123/71 (20 @ 05:01) (108/70 - 123/71)  RR: 18 (20 @ 05:01) (17 - 18)  SpO2: 99% (20 @ 05:01) (97% - 99%)  Wt(kg): --    PHYSICAL EXAM:    GENERAL: not in any distress  HEENT: Neck is supple, normocephalic, atraumatic   CHEST/LUNG: Clear to percussion bilaterally; No rales, rhonchi, wheezing  HEART: Regular rate and rhythm; No murmurs, rubs, or gallops  ABDOMEN: Soft, Nontender, Nondistended; Bowel sounds present, no rebound   EXTREMITIES:  2+ Peripheral Pulses, No clubbing, cyanosis, or edema  GENITOURINARY:   SKIN: No rashes or lesions  BACK: no pressor sore   NERVOUS SYSTEM:  Alert & Oriented X3, Good concentration  PSYCH: normal affect     LABS:         138  |  104  |  30<H>  ----------------------------<  85  3.8   |  29  |  0.82    Ca    9.1      2020 06:58        Radiology:

## 2020-11-23 NOTE — OCCUPATIONAL THERAPY INITIAL EVALUATION ADULT - PERSONAL SAFETY AND JUDGMENT, REHAB EVAL
Pt noted with severe short & long term memory deficits, decreased safety awareness, decreased attention & decreased problem solving, as evidenced by 28/28 score on 'Short Blessed Test' (severe memory/attention impairement consistent with dementia)/impaired

## 2020-11-23 NOTE — PHYSICAL THERAPY INITIAL EVALUATION ADULT - MODALITIES TREATMENT COMMENTS
Fugl-French Assessment - Lower Extremity: 24/28, Coordination/Speed 4/6 - indicating high level of mobility function for stroke survivors.
Pt able to ambulate without device 50 feet however with episode of loss of balance; ambulated 65 feet with rolling walker with supervision, more stable with device.

## 2020-11-23 NOTE — PHYSICAL THERAPY INITIAL EVALUATION ADULT - CRITERIA FOR SKILLED THERAPEUTIC INTERVENTIONS
risk reduction/prevention/impairments found/functional limitations in following categories
anticipated discharge recommendation/impairments found/functional limitations in following categories/risk reduction/prevention/therapy frequency/anticipated equipment needs at discharge/rehab potential/predicted duration of therapy intervention

## 2020-11-23 NOTE — PHYSICAL THERAPY INITIAL EVALUATION ADULT - MUSCLE TONE ASSESSMENT, REHAB EVAL
normal/bilateral lower extremities/bilateral upper extremities
normal/bilateral upper extremities/bilateral lower extremities

## 2020-11-24 LAB — SARS-COV-2 RNA SPEC QL NAA+PROBE: SIGNIFICANT CHANGE UP

## 2020-11-24 PROCEDURE — 99231 SBSQ HOSP IP/OBS SF/LOW 25: CPT

## 2020-11-24 RX ORDER — LISINOPRIL 2.5 MG/1
1 TABLET ORAL
Qty: 0 | Refills: 0 | DISCHARGE

## 2020-11-24 RX ADMIN — Medication 1 PATCH: at 11:31

## 2020-11-24 RX ADMIN — HEPARIN SODIUM 5000 UNIT(S): 5000 INJECTION INTRAVENOUS; SUBCUTANEOUS at 17:47

## 2020-11-24 RX ADMIN — CARVEDILOL PHOSPHATE 3.12 MILLIGRAM(S): 80 CAPSULE, EXTENDED RELEASE ORAL at 06:05

## 2020-11-24 RX ADMIN — Medication 1 PATCH: at 11:00

## 2020-11-24 RX ADMIN — MEMANTINE HYDROCHLORIDE 10 MILLIGRAM(S): 10 TABLET ORAL at 17:47

## 2020-11-24 RX ADMIN — MEMANTINE HYDROCHLORIDE 10 MILLIGRAM(S): 10 TABLET ORAL at 06:05

## 2020-11-24 RX ADMIN — Medication 40 MILLIGRAM(S): at 06:05

## 2020-11-24 RX ADMIN — Medication 1 PATCH: at 20:24

## 2020-11-24 RX ADMIN — Medication 3 MILLIGRAM(S): at 21:30

## 2020-11-24 RX ADMIN — ATORVASTATIN CALCIUM 40 MILLIGRAM(S): 80 TABLET, FILM COATED ORAL at 21:30

## 2020-11-24 RX ADMIN — HEPARIN SODIUM 5000 UNIT(S): 5000 INJECTION INTRAVENOUS; SUBCUTANEOUS at 06:05

## 2020-11-24 RX ADMIN — Medication 1 PATCH: at 07:00

## 2020-11-24 RX ADMIN — CHLORHEXIDINE GLUCONATE 1 APPLICATION(S): 213 SOLUTION TOPICAL at 11:31

## 2020-11-24 RX ADMIN — Medication 81 MILLIGRAM(S): at 11:30

## 2020-11-24 RX ADMIN — DONEPEZIL HYDROCHLORIDE 5 MILLIGRAM(S): 10 TABLET, FILM COATED ORAL at 21:30

## 2020-11-24 NOTE — DISCHARGE NOTE PROVIDER - NSDCFUADDINST_GEN_ALL_CORE_FT
It is important to see your primary physician as well as other necessary consultants within the next week to perform a comprehensive medical review.  Call their offices for an appointment as soon as you leave the hospital.  If you do not have a primary physician or cant reach him, contact the [facility] Physician Referral Service at [phone].  Your medical issues appear to be stable at this time, but if your symptoms recur or worsen, contact your physicians and/or return to the hospital if necessary.  If you encounter any issues or questions with your medication, call your physicians before stopping the medication.

## 2020-11-24 NOTE — DISCHARGE NOTE PROVIDER - CARE PROVIDERS DIRECT ADDRESSES
,DirectAddress_Unknown ,DirectAddress_Unknown,[email or web address],DirectAddress_Unknown ,[email or web address],DirectAddress_Unknown,DirectAddress_Unknown,DirectAddress_Unknown

## 2020-11-24 NOTE — DISCHARGE NOTE PROVIDER - PROVIDER RX CONTACT NUMBER
Patient:   PAUL TINOCO            MRN: [id number]            FIN: [id number]              Age:   9 years     Sex:  MALE     :  12   Associated Diagnoses:   None   Author:   ADRIAN MENDOZA     Narrative Summary   Summary of Events:  Smart Template    Pediatric Discharge Summary    Date of Admission: 10/14/2019  Transfer to PICU: 10/15/2019  Return to Floor: 10/16/2019  Date of Discharge: 10/19/2019  PMD: Dr. Yadiel Castillo    PRIMARY DIAGNOSIS: Acute respiratory failure 2/2 mycoplasma pneumonia      HPI:     Patient is a 8 yo M with history of trisomy 21 and hypothyroidism here as transfer from [facility] due to acute hypoxic respiratory failure 2/2 right perihilar pneumonia. Starting 10/9 had fevers with Tmax 102.4 with cough that started in the evening and progressively worsened throughout the week. Patient was seen by PCP 10/12 and was diagnosed with a viral URI. Early morning 10/14 was noted to have barky cough and stridor that did not improve  with steam from a shower. He was noted to have increased work of breathing and was brought to [facility] ED for further evaluation. Noted to have some decreased PO intake but voiding at almost baseline with at least 4 voids in the last 24 hours. Noted to have 2 episodes of NBNB emesis with one associated with giving motrin and coughing. Denies any diarrhea, rashes, ear pain or drainage, abdominal pain.         ED Course:   Vitals: , R 28, SpO2 89%-->96% on 2L NC, T 37.6, BP 84/56    albuterol 2.5mg neb x1, rac epi x1, decadron 0.5mg/kg x1, ceftriaxone 1g (50mg/kg IV)x1, ibuprofen 10mg/kg PO x1, 20ml/kg NS bolus x1    Blood culture sent, CBC, CMP, CXR     Na 140, K 4.1, Cl 106, CO2 26, BUn 14, Cr 0.61, Ca 8.9, glu 129, AST 40, ALT 37, T bili 0.5, Albumin 3.8  WBC 6.9, Hgb 14.3, Hct 41.5, Plt 220, N86.2%    CXR with normal heart size and vascularity. Vascular clips project over the AP window. There is peribronchial thickening  consistent with right infrahilar infiltrate/pneumonia. No significant effusion. No lymphadenopathy.       HOSPITAL COURSE  On the floor he was started on ampicillin, and was intially one 1.5 L NC and received CPT every 4 hours while awake. At night on day of admission, he began to need more oxygen and suctioning due to desaturations to the mid 80s. His oxygen requirements increased to 20L 60%, was given an albuterol treatment, and would continue to desat to mid to low 80s while asleep. He was then transferred to the PICU for further managment.     In the PICU, he was placed to a max of  30 L 70% , was given 2.5 mg albuterol q4, and continued on ampicillin until an RPP was positive for mycoplasma pneumonia. He was then transitioned to azithromycin. He was weaned to HFNC 10L at 40% with Albuterol 2.5 q4. He was febrile twice and defervsced with motrin use. He was then transfered  back to the floor.    On the floor he completed his azithromycin course, was weaned to room air, and his albuterol was switched to a MDI.    His home medications of levothyroxine and claritin were continued throughout this admission. Patient was clinically and hemodynamically stable on room air prior to discharge and saturating in the 90s. Mother was comfortable taking him home at this time, all questions were addressed.      DISCHARGE MEDICATION LIST   Allergies: No Known Medication Allergies     MEDICATION  DOSE  ROUTE  FREQUENCY  SPECIAL INSTRUCTIONS   acetaminophen (acetaminophen 160 mg/5 mL oral suspension)  304 mg=9.5 mL  Oral  Every 4 hours As Needed: fever    albuterol (albuterol HFA inhaler oral 90 mcg/puff)  4 puff  Inhaled  Every 4 hours  - Use every 4 hrs for the next 24 hrs, then every 4 hrs as needed for cough or wheezing. use with spacer chamber  ibuprofen (ibuprofen 100 mg/5 mL oral suspension)  200 mg=10 mL  Oral  Every 6 hours As Needed: fever    levothyroxine (levothyroxine 50 mcg (0.05 mg) oral tablet)  50 mcg=1 tab  Oral   Daily    loratadine (Claritin 5 mg oral tablet, chewable)  5 mg=1 tab  Chewed  Daily       DISCHARGE FOLLOW-UP: With Dr. Yadiel Castillo on 10/21/2019 at 1350.    PENDING LABS: none    DISCHARGE INSTRUCTIONS: none      IMMUNIZATIONS: Immunizations up to date per parents , received flu vaccine prior to discharge.    DISCHARGE EXAM:   General:  Alert and oriented, No acute distress.         Appearance: Well nourished.    Eye:  Pupils are equal, round and reactive to light, Extraocular movements are intact, Normal conjunctiva.   HENT:  Normocephalic, Atraumatic, Oral mucosa is moist, Down facies.   Neck:  Supple, Non-tender, No lymphadenopathy.    Neurologic:  Alert, Normal sensory, Normal motor function, non verbal at baseline.   Respiratory:  coarse breath sounds, no stridor audible, aeration to bases b/l.         Respirations: Are within normal limits.    Cardiovascular:  Normal rate, Regular rhythm, No murmur       Capillary refill: Less than 2 seconds.    Gastrointestinal:  Soft, Non-tender, Non-distended, Normal bowel sounds, No organomegaly.   Musculoskeletal:  No tenderness, No swelling, No deformity.    Integumentary:  Warm, Dry, Pink.         Integumentary exam: Normal for ethnicity.    Psychiatric:  Cooperative.  .      Discharge Plan   Diagnosis/Problem List   Problems/Past Medical History:    Hypothyroid  Trisomy 21, Down syndrome  .     Education and Follow-up   Discharge Planning: Mycoplasma Infection, Pediatric, Pneumonia, Child, Easy-to-Read, Dr. Yadiel Castillo 10/21/19 13:50:00 Please arrive 15 minutes prior to your scheduled appointment..   [phone]

## 2020-11-24 NOTE — CHART NOTE - NSCHARTNOTEFT_GEN_A_CORE
Advanced dementia. Patient has advanced dementia and not able to take care of herself at home. Patient needs 24/7 supervision at home for safe discharge.

## 2020-11-24 NOTE — PROGRESS NOTE ADULT - SUBJECTIVE AND OBJECTIVE BOX
CHIEF COMPLAINT: No overnight events   (limited ROS because of dementia)      PHYSICAL EXAM:    GENERAL: Elderly pleasant   CHEST/LUNG: Clear to ausculation bilaterally, no wheezing, no crackles   HEART: Regular rate and rhythm; No murmurs, rubs  ABDOMEN: Soft, Nontender, Nondistended; Bowel sounds present  EXTREMITIES:  Moving all four extremities spontaneously, No clubbing, cyanosis,  NERVOUS SYSTEM:  able to lift her arms and make a fist bilaterally.   Psychiatry: AAO x name only        OBJECTIVE DATA:     Vital Signs Last 24 Hrs  T(C): 36.9 (2020 17:20), Max: 36.9 (2020 17:20)  T(F): 98.4 (2020 17:20), Max: 98.4 (2020 17:20)  HR: 67 (2020 17:20) (65 - 87)  BP: 101/67 (2020 17:20) (101/67 - 119/79)  BP(mean): --  RR: 18 (2020 17:20) (17 - 18)  SpO2: 98% (2020 17:20) (97% - 98%)           Daily     Daily Weight in k.2 (2020 05:00)    MEDICATIONS  (STANDING):  aspirin  chewable 81 milliGRAM(s) Oral daily  atorvastatin 40 milliGRAM(s) Oral at bedtime  carvedilol 3.125 milliGRAM(s) Oral every 12 hours  chlorhexidine 4% Liquid 1 Application(s) Topical daily  donepezil 5 milliGRAM(s) Oral at bedtime  furosemide    Tablet 40 milliGRAM(s) Oral daily  heparin   Injectable 5000 Unit(s) SubCutaneous every 12 hours  melatonin 3 milliGRAM(s) Oral at bedtime  memantine 10 milliGRAM(s) Oral two times a day  nicotine -   7 mG/24Hr(s) Patch 1 patch Transdermal daily    MEDICATIONS  (PRN):

## 2020-11-24 NOTE — PROGRESS NOTE ADULT - ASSESSMENT
Subjective Complaints:  Historian:             Vital Signs Last 24 Hrs  T(C): 36.9 (24 Nov 2020 17:20), Max: 36.9 (24 Nov 2020 17:20)  T(F): 98.4 (24 Nov 2020 17:20), Max: 98.4 (24 Nov 2020 17:20)  HR: 67 (24 Nov 2020 17:20) (65 - 87)  BP: 101/67 (24 Nov 2020 17:20) (101/67 - 119/79)  BP(mean): --  RR: 18 (24 Nov 2020 17:20) (17 - 18)  SpO2: 98% (24 Nov 2020 17:20) (97% - 98%)    GENERAL PHYSICAL EXAM:  General:  Appears stated age, well-groomed, well-nourished, no distress  HEENT:  NC/AT, patent nares w/ pink mucosa, OP clear w/o lesions, PERRL, EOMI, conjunctivae clear, no thyromegaly, nodules, adenopathy, no JVD  Chest:  Full & symmetric excursion, no increased effort, breath sounds clear  Cardiovascular:  Regular rhythm, S1, S2, no murmur/rub/S3/S4, no carotid/femoral/abdominal bruit, radial/pedal pulses 2+, no edema  Abdomen:  Soft, non-tender, non-distended, normoactive bowel sounds, no HSM  Extremities:  Gait & station:   Digits:   Nails:   Joints, Bones, Muscles:   ROM:   Stability:  Skin:  No rash/erythema/ecchymoses/petechiae/wounds/abscess/warm/dry  Musculoskeletal:  Full ROM in all joints w/o swelling/tenderness/effusion        LABS:                RADIOLOGY & ADDITIONAL STUDIES:        Neurology Progress Note:      Mental Status: awaek alert speechb fluent follows commands dementria       Cranial Nerves: 2 1/2 intact      Motor:   arm leg 4/5         Sensory: intact      Cerebellar: defrd      Gait:unsteady       Assesment/Plan: cva csf vdrl negative dementia cva multi infarct dementia  for suba cute rehab no seizure

## 2020-11-24 NOTE — DISCHARGE NOTE PROVIDER - PROVIDER TOKENS
FREE:[LAST:[YOur PCP in a week],PHONE:[(   )    -],FAX:[(   )    -]] FREE:[LAST:[Murphy],FIRST:[reginaldo],PHONE:[(   )    -],FAX:[(   )    -],SCHEDULEDAPPT:[12/04/2020],SCHEDULEDAPPTTIME:[02:10 PM]] FREE:[LAST:[Murphy],FIRST:[paman],PHONE:[(   )    -],FAX:[(   )    -],SCHEDULEDAPPT:[12/04/2020],SCHEDULEDAPPTTIME:[02:10 PM]],PROVIDER:[TOKEN:[[id number]]],PROVIDER:[TOKEN:[[id number]]] PROVIDER:[TOKEN:[[id number]]],PROVIDER:[TOKEN:[[id number]]],FREE:[LAST:[Murphy],FIRST:[reginaldo],PHONE:[(   )    -],FAX:[(   )    -],SCHEDULEDAPPT:[12/04/2020],SCHEDULEDAPPTTIME:[02:10 PM]],PROVIDER:[TOKEN:[[id number]]]

## 2020-11-24 NOTE — PROGRESS NOTE ADULT - ASSESSMENT
80 years old female with history of dementia and Hypertension and diabetes was found with wandering with cerebrovascular accident seen and LBBB with elevated trop     Acute Systolic Heart Failure/ NSTEMI:  - Echo with EF < 20 %, dilated cardiomyopathy   - S/P heparin gtt   - Continue ASA, plavix   - Continue statin  - No plan for JOAVN per cards.   - Stress test cancelled for acute CVA  -Off Lisinopril because of risk of hypotension.     Anemia:  - Microcytic anemia  - Neg occult blood  - Iron studies not consistent with iron deficient   - Hb stable, outpt follow up and work up.    Acute/subacute CVA:  - MR brain noted, possibly embolic  - CTA head and neck unremarkable  - Neurology eval appreciated   - >10 days since diagnosis of CVA.   - Continue ASA, plavix ( NIHSS score < 4, presumed small vessel disease, unless JOVAN positive), stop plavix on 11/28/20.     Tertiary syphilis,. 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 Dced PCN.     Essential hypertension: controlled. lisinopril was discontinued in discharge med list.     Anemia. no active gross bleeding.     Advanced dementia. cont supportive care.     Full code  Medically ready for discharge but pending private hire at home.

## 2020-11-24 NOTE — DISCHARGE NOTE PROVIDER - NSDCMRMEDTOKEN_GEN_ALL_CORE_FT
Aricept 5 mg oral tablet: 1 tab(s) orally once a day (at bedtime)  aspirin 81 mg oral tablet: 1 tab(s) orally once a day  Coreg 3.125 mg oral tablet: 1 tab(s) orally 2 times a day  furosemide 40 mg oral tablet: 1 tab(s) orally once a day  Lipitor 40 mg oral tablet: 1 tab(s) orally once a day  memantine 10 mg oral tablet: 1 tab(s) orally 2 times a day  Vitamin D2 50,000 intl units (1.25 mg) oral capsule: 1 cap(s) orally once a week   Aricept 5 mg oral tablet: 1 tab(s) orally once a day (at bedtime)  aspirin 81 mg oral delayed release tablet: 1 tab(s) orally once a day   Coreg 3.125 mg oral tablet: 1 tab(s) orally 2 times a day  furosemide 40 mg oral tablet: 1 tab(s) orally once a day  Lipitor 40 mg oral tablet: 1 tab(s) orally once a day  memantine 10 mg oral tablet: 1 tab(s) orally 2 times a day  Vitamin D2 50,000 intl units (1.25 mg) oral capsule: 1 cap(s) orally once a week

## 2020-11-24 NOTE — DISCHARGE NOTE PROVIDER - CARE PROVIDER_API CALL
YOur PCP in a week,   Phone: (   )    -  Fax: (   )    -  Follow Up Time:    reginaldo Murphy  Phone: (   )    -  Fax: (   )    -  Scheduled Appointment: 12/04/2020 02:10 PM   regianldo Murphy  Phone: (   )    -  Fax: (   )    -  Scheduled Appointment: 12/04/2020 02:10 PM    Todd Lamas  MEDICINE - CARDIOLOGY  [address] [place] [address]  Phone: [phone]  Fax: [phone]  Follow Up Time:     Familia Berger  NEUROLOGY  [address], [place] [id number]  Phone: [phone]  Fax: [phone]  Follow Up Time:    Todd Lamas  MEDICINE - CARDIOLOGY  [address] [place] [address]  Phone: [phone]  Fax: [phone]  Follow Up Time:     Familia Berger  NEUROLOGY  [address], [place] [id number]  Phone: [phone]  Fax: [phone]  Follow Up Time:     reginaldo Murphy  Phone: (   )    -  Fax: (   )    -  Scheduled Appointment: 12/04/2020 02:10 PM    Lorenzo Granados  ENDOCRINOLOGY/METAB/DIABETES  [address] [facility], [address]  [place] [address]  Phone: [phone]  Fax: [phone]  Follow Up Time:

## 2020-11-24 NOTE — DISCHARGE NOTE PROVIDER - HOSPITAL COURSE
80 years old female with history of dementia and Hypertension and diabetes was found with wandering. diagnosed with NSTEMI and acute cerebrovascular accident during hospital stay. neurologist and cardiologist were consulted. Neurosyphillis was also suspected but later ruled out with negative CSF VDRL. PT was consulted and recommended supervised care at home. discussed with son who refused NH and agreed to get private aid to take care of his mother when he works. Medically patient is ready for discharge.     Acute Systolic Heart Failure/ NSTEMI:  - Echo with EF < 20 %, dilated cardiomyopathy   - S/P heparin gtt   - Continue ASA, plavix   - Continue statin  - No plan for JOVAN per cards.   - Stress test cancelled for acute CVA  -Off Lisinopril because of risk of hypotension.     Anemia:  - Microcytic anemia  - Neg occult blood  - Iron studies not consistent with iron deficient   - Hb stable, outpt follow up and work up.    Acute/subacute CVA with acute probable metabolic encehalopathy:  - MR brain noted, possibly embolic  - CTA head and neck unremarkable  - Neurology eval appreciated   - >10 days since diagnosis of CVA.   - Continue ASA, plavix ( NIHSS score < 4, presumed small vessel disease, unless JOVAN positive) per Neurologist.      Tertiary syphilis,. 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 Dced PCN.     Essential hypertension: better. cont current meds with holding parameters.     Anemia. no active gross bleeding.     Advanced dementia. Patient has advanced dementia and not able to take care of herself at home. Patient needs 24/7 supervision at home for safe discharge.     Full code          Nutritional Assessment:  · Nutritional Assessment  This patient has been assessed with a concern for Malnutrition and has been determined to have a diagnosis/diagnoses of Moderate protein-calorie malnutrition.    This patient is being managed with:   Diet Soft-  Low Sodium  Supplement Feeding Modality:  Oral  Ensure Enlive Cans or Servings Per Day:  2       Frequency:  Daily  Entered: Nov 18 2020  1:29PM    Seen and examined by me today. Vitals stable.   All questions welcomed and answered appropriately. Patient's son verbalized understanding of post discharge physician's follows up and discharge instructions.   DC time spent by me excluding billable procedures 38 mins 80 years old female with history of dementia and Hypertension and diabetes was found with wandering. diagnosed with NSTEMI and acute cerebrovascular accident during hospital stay. neurologist and cardiologist were consulted. Neurosyphillis was also suspected but later ruled out with negative CSF VDRL. PT was consulted and recommended supervised care at home. discussed with son who refused NH and agreed to get private aid to take care of his mother when he works. Medically patient is ready for discharge.     Acute Systolic Heart Failure/ NSTEMI:  - Echo with EF < 20 %, dilated cardiomyopathy   - S/P heparin gtt   - Continue ASA, plavix   - Continue statin  - No plan for JOVAN per cards.   - Stress test cancelled for acute CVA  -Off Lisinopril because of risk of hypotension.     Anemia:  - Microcytic anemia  - Neg occult blood  - Iron studies not consistent with iron deficient   - Hb stable, outpt follow up and work up.    Acute/subacute CVA with acute probable metabolic encehalopathy:  - MR brain noted, possibly embolic  - CTA head and neck unremarkable  - Neurology eval appreciated   - >10 days since diagnosis of CVA.   - Continue ASA, plavix ( NIHSS score < 4, presumed small vessel disease, unless JOVAN positive) per Neurologist.      Tertiary syphilis,. 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 Dced PCN.     Essential hypertension: better. cont current meds with holding parameters.     Anemia. no active gross bleeding.     Advanced dementia. Patient has advanced dementia and not able to take care of herself at home. Patient needs 24/7 supervision at home for safe discharge.     Full code          Nutritional Assessment:  · Nutritional Assessment  This patient has been assessed with a concern for Malnutrition and has been determined to have a diagnosis/diagnoses of Moderate protein-calorie malnutrition.    This patient is being managed with:   Diet Soft-  Low Sodium  Supplement Feeding Modality:  Oral  Ensure Enlive Cans or Servings Per Day:  2       Frequency:  Daily  Entered: Nov 18 2020  1:29PM    Seen and examined by me today. Vitals stable.   All questions welcomed and answered appropriately. Patient's son (discussed with him on phone) verbalized understanding of post discharge physician's follows up and discharge instructions.   DC time spent by me excluding billable procedures 38 mins 80 years old female with history of dementia and Hypertension and diabetes II was found with wandering with cerebrovascular accident and LBBB with NSTEMI and Acute Systolic Heart Failure. Echo with EF<20 %, dilated cardiomyopathy w/ grade I diastolic dysfunction. Pt improved w/ Coreg & Lasix. Her Lisinopril was stopped because of risk of hypotension. Her trop peaked at 27.2. She was initially put on heparin gtt, which has been stopped. A stress test was ordered but was cancelled when it was found that the patient has just had an acute CVA. As per cardio, the stress test will need to be done as outpatient. MR brain showed an acute and/or recent subacute infarct and infarcts with diffusion abnormalities in both occipital lobes, as well as within the cerebellum & a small recent infarct in the right frontal white matter suggesting embolic or thrombotic showering. CTA head and neck were unremarkable. Carotid US showed no evidence of hemodynamically significant internal carotid artery stenosis. Bubble study showed an intact intra atrial septum w/ no evidence of any thrombus. As per cardio, a JOVAN was not done.  RPR was positive w/ low titers and the patient was started on empiric  Penicillin for neurosyphilis. However, the VDRL CSF was negative so ID stopped the penicillin. Of note, the patient had microcytic anemia but was occult blood NEGATIVE. Iron studies were not consistent with iron deficiency. She also had an enlarged thyroid gland containing bilateral nodules and calcifications on CT and thyroid ultrasound showed an enlarged thyroid gland containing multiple bilateral thyroid nodules. TSH was 0.264, but free T4 was 1.3, c/w euthyroid sick syndrome. Pt recommended rehab but the pt's son elected to take her home instead.     Discharge time: 43 minutes 80 years old female with history of dementia and Hypertension and diabetes II was found with wandering with cerebrovascular accident and LBBB with NSTEMI and Acute Systolic Heart Failure. Echo with EF<20 %, dilated cardiomyopathy w/ grade I diastolic dysfunction. Pt improved w/ Coreg & Lasix. Her Lisinopril was stopped because of risk of hypotension. Her trop peaked at 27.2. She was initially put on heparin gtt, which has been stopped. A stress test was ordered but was cancelled when it was found that the patient has just had an acute CVA. As per cardio, the stress test will need to be done as outpatient. MR brain showed an acute and/or recent subacute infarct and infarcts with diffusion abnormalities in both occipital lobes, as well as within the cerebellum & a small recent infarct in the right frontal white matter suggesting embolic or thrombotic showering. CTA head and neck were unremarkable. Carotid US showed no evidence of hemodynamically significant internal carotid artery stenosis. Bubble study showed an intact intra atrial septum w/ no evidence of any thrombus. As per cardio, a JOVAN was not done.  RPR was positive w/ low titers and the patient was started on empiric  Penicillin for neurosyphilis. However, the VDRL CSF was negative so ID stopped the penicillin. Of note, the patient had microcytic anemia but was occult blood NEGATIVE. Iron studies were not consistent with iron deficiency. She also had an enlarged thyroid gland containing bilateral nodules and calcifications on CT and thyroid ultrasound showed an enlarged thyroid gland containing multiple bilateral thyroid nodules. TSH was 0.264, but free T4 was 1.3, c/w euthyroid sick syndrome. Pt recommended rehab but the pt's son elected to take her home instead. I spoke w/ her son at bedside today and told him to have her see cardio, neurology, PMD and endo as outpatient.     Discharge time: 43 minutes

## 2020-11-24 NOTE — DISCHARGE NOTE PROVIDER - NSDCCPCAREPLAN_GEN_ALL_CORE_FT
PRINCIPAL DISCHARGE DIAGNOSIS  Diagnosis: Altered mental status  Assessment and Plan of Treatment:       SECONDARY DISCHARGE DIAGNOSES  Diagnosis: Elevated troponin  Assessment and Plan of Treatment:     Diagnosis: CVA (cerebral vascular accident)  Assessment and Plan of Treatment:

## 2020-11-25 PROCEDURE — 99231 SBSQ HOSP IP/OBS SF/LOW 25: CPT

## 2020-11-25 RX ADMIN — HEPARIN SODIUM 5000 UNIT(S): 5000 INJECTION INTRAVENOUS; SUBCUTANEOUS at 05:15

## 2020-11-25 RX ADMIN — HEPARIN SODIUM 5000 UNIT(S): 5000 INJECTION INTRAVENOUS; SUBCUTANEOUS at 17:21

## 2020-11-25 RX ADMIN — Medication 81 MILLIGRAM(S): at 11:51

## 2020-11-25 RX ADMIN — Medication 1 PATCH: at 11:52

## 2020-11-25 RX ADMIN — CHLORHEXIDINE GLUCONATE 1 APPLICATION(S): 213 SOLUTION TOPICAL at 11:52

## 2020-11-25 RX ADMIN — MEMANTINE HYDROCHLORIDE 10 MILLIGRAM(S): 10 TABLET ORAL at 17:21

## 2020-11-25 RX ADMIN — ATORVASTATIN CALCIUM 40 MILLIGRAM(S): 80 TABLET, FILM COATED ORAL at 21:19

## 2020-11-25 RX ADMIN — Medication 1 PATCH: at 11:53

## 2020-11-25 RX ADMIN — Medication 1 PATCH: at 11:00

## 2020-11-25 RX ADMIN — MEMANTINE HYDROCHLORIDE 10 MILLIGRAM(S): 10 TABLET ORAL at 05:15

## 2020-11-25 RX ADMIN — CARVEDILOL PHOSPHATE 3.12 MILLIGRAM(S): 80 CAPSULE, EXTENDED RELEASE ORAL at 05:15

## 2020-11-25 RX ADMIN — Medication 40 MILLIGRAM(S): at 05:15

## 2020-11-25 RX ADMIN — Medication 1 PATCH: at 19:18

## 2020-11-25 RX ADMIN — Medication 3 MILLIGRAM(S): at 21:19

## 2020-11-25 RX ADMIN — DONEPEZIL HYDROCHLORIDE 5 MILLIGRAM(S): 10 TABLET, FILM COATED ORAL at 21:19

## 2020-11-25 NOTE — PROGRESS NOTE ADULT - ASSESSMENT
Subjective Complaints:  Historian:             Vital Signs Last 24 Hrs  T(C): 36.4 (25 Nov 2020 16:04), Max: 36.7 (24 Nov 2020 23:40)  T(F): 97.5 (25 Nov 2020 16:04), Max: 98.1 (24 Nov 2020 23:40)  HR: 67 (25 Nov 2020 16:04) (67 - 71)  BP: 108/78 (25 Nov 2020 16:04) (97/58 - 115/72)  BP(mean): --  RR: 16 (25 Nov 2020 16:04) (16 - 18)  SpO2: 97% (25 Nov 2020 16:04) (97% - 98%)    GENERAL PHYSICAL EXAM:  General:  Appears stated age, well-groomed, well-nourished, no distress  HEENT:  NC/AT, patent nares w/ pink mucosa, OP clear w/o lesions, PERRL, EOMI, conjunctivae clear, no thyromegaly, nodules, adenopathy, no JVD  Chest:  Full & symmetric excursion, no increased effort, breath sounds clear  Cardiovascular:  Regular rhythm, S1, S2, no murmur/rub/S3/S4, no carotid/femoral/abdominal bruit, radial/pedal pulses 2+, no edema  Abdomen:  Soft, non-tender, non-distended, normoactive bowel sounds, no HSM  Extremities:  Gait & station:   Digits:   Nails:   Joints, Bones, Muscles:   ROM:   Stability:  Skin:  No rash/erythema/ecchymoses/petechiae/wounds/abscess/warm/dry  Musculoskeletal:  Full ROM in all joints w/o swelling/tenderness/effusion        LABS:                RADIOLOGY & ADDITIONAL STUDIES:        Neurology Progress Note:      Mental Status: awake alert  speech fluent confused       Cranial Nerves: 2 1/21 intact      Motor:   arm leg 4/5         Sensory: intact      Cerebellar: defrd      Gait: unsteady       Assesment/Plan: cva dementia csf vdrl negative had pcn as per id family will pick her up home pt  will folow in office

## 2020-11-25 NOTE — PROGRESS NOTE ADULT - ASSESSMENT
80 years old female with history of dementia and Hypertension and diabetes was found with wandering with cerebrovascular accident seen and LBBB with elevated trop     Acute Systolic Heart Failure/ NSTEMI:  - Echo with EF < 20 %, dilated cardiomyopathy   - S/P heparin gtt   - Continue ASA, plavix   - Continue statin  - No plan for JOVAN per cards.   - Stress test cancelled for acute CVA  -Off Lisinopril because of risk of hypotension.     Anemia:  - Microcytic anemia  - Neg occult blood  - Iron studies not consistent with iron deficient   - Hb stable, outpt follow up and work up.    Acute/subacute CVA:  - MR brain noted, possibly embolic  - CTA head and neck unremarkable  - Neurology eval appreciated   - >10 days since diagnosis of CVA.   - Continue ASA, plavix ( NIHSS score < 4, presumed small vessel disease, unless JOVAN positive), stop plavix on 11/28/20.     Tertiary syphilis,. 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 Dced PCN.     Essential hypertension: controlled. lisinopril was discontinued in discharge med list.     Anemia. no active gross bleeding.     Advanced dementia. cont supportive care.     Full code  Discussed with son on phone. he is willing to her home later today.   Home PT and PCP appointment is arranged already by care manager/.

## 2020-11-25 NOTE — PROGRESS NOTE ADULT - SUBJECTIVE AND OBJECTIVE BOX
CHIEF COMPLAINT: No overnight events   (limited ROS because of dementia)  No fever no diarrhea no vomiting reported.     PHYSICAL EXAM:    GENERAL: Elderly pleasant   CHEST/LUNG: Clear to ausculation bilaterally, no wheezing, no crackles   HEART: Regular rate and rhythm; No murmurs, rubs  ABDOMEN: Soft, Nontender, Nondistended; Bowel sounds present  EXTREMITIES:  Moving all four extremities spontaneously, No clubbing, cyanosis,  NERVOUS SYSTEM:  able to lift her arms and make a fist bilaterally.   Psychiatry: AAO x name only        OBJECTIVE DATA:       Vital Signs Last 24 Hrs  T(C): 36.6 (25 Nov 2020 04:40), Max: 36.9 (24 Nov 2020 17:20)  T(F): 97.8 (25 Nov 2020 04:40), Max: 98.4 (24 Nov 2020 17:20)  HR: 68 (25 Nov 2020 04:40) (67 - 69)  BP: 115/72 (25 Nov 2020 04:40) (101/56 - 117/70)  BP(mean): --  RR: 18 (25 Nov 2020 04:40) (18 - 18)  SpO2: 98% (25 Nov 2020 04:40) (97% - 98%)           Daily     Daily     MEDICATIONS  (STANDING):  aspirin  chewable 81 milliGRAM(s) Oral daily  atorvastatin 40 milliGRAM(s) Oral at bedtime  carvedilol 3.125 milliGRAM(s) Oral every 12 hours  chlorhexidine 4% Liquid 1 Application(s) Topical daily  donepezil 5 milliGRAM(s) Oral at bedtime  furosemide    Tablet 40 milliGRAM(s) Oral daily  heparin   Injectable 5000 Unit(s) SubCutaneous every 12 hours  melatonin 3 milliGRAM(s) Oral at bedtime  memantine 10 milliGRAM(s) Oral two times a day  nicotine -   7 mG/24Hr(s) Patch 1 patch Transdermal daily    MEDICATIONS  (PRN):

## 2020-11-26 PROCEDURE — 99231 SBSQ HOSP IP/OBS SF/LOW 25: CPT

## 2020-11-26 RX ADMIN — MEMANTINE HYDROCHLORIDE 10 MILLIGRAM(S): 10 TABLET ORAL at 05:17

## 2020-11-26 RX ADMIN — MEMANTINE HYDROCHLORIDE 10 MILLIGRAM(S): 10 TABLET ORAL at 17:24

## 2020-11-26 RX ADMIN — CARVEDILOL PHOSPHATE 3.12 MILLIGRAM(S): 80 CAPSULE, EXTENDED RELEASE ORAL at 17:24

## 2020-11-26 RX ADMIN — DONEPEZIL HYDROCHLORIDE 5 MILLIGRAM(S): 10 TABLET, FILM COATED ORAL at 21:23

## 2020-11-26 RX ADMIN — Medication 1 PATCH: at 07:00

## 2020-11-26 RX ADMIN — Medication 81 MILLIGRAM(S): at 11:28

## 2020-11-26 RX ADMIN — Medication 1 PATCH: at 11:28

## 2020-11-26 RX ADMIN — Medication 1 PATCH: at 11:20

## 2020-11-26 RX ADMIN — Medication 1 PATCH: at 20:13

## 2020-11-26 RX ADMIN — ATORVASTATIN CALCIUM 40 MILLIGRAM(S): 80 TABLET, FILM COATED ORAL at 21:23

## 2020-11-26 RX ADMIN — HEPARIN SODIUM 5000 UNIT(S): 5000 INJECTION INTRAVENOUS; SUBCUTANEOUS at 05:17

## 2020-11-26 RX ADMIN — CHLORHEXIDINE GLUCONATE 1 APPLICATION(S): 213 SOLUTION TOPICAL at 11:28

## 2020-11-26 RX ADMIN — Medication 3 MILLIGRAM(S): at 21:23

## 2020-11-26 RX ADMIN — HEPARIN SODIUM 5000 UNIT(S): 5000 INJECTION INTRAVENOUS; SUBCUTANEOUS at 17:25

## 2020-11-26 RX ADMIN — Medication 40 MILLIGRAM(S): at 05:17

## 2020-11-26 NOTE — PROGRESS NOTE ADULT - ASSESSMENT
80 years old female with history of dementia and Hypertension and diabetes was found with wandering with cerebrovascular accident seen and LBBB with elevated trop     Acute Systolic Heart Failure/ NSTEMI:  - Echo with EF < 20 %, dilated cardiomyopathy   - S/P heparin gtt   - Continue ASA, plavix   - Continue statin  - No plan for JOVAN per cards.   - Stress test cancelled for acute CVA  -Off Lisinopril because of risk of hypotension.     Anemia:  - Microcytic anemia  - Neg occult blood  - Iron studies not consistent with iron deficient   - Hb stable, outpt follow up and work up.    Acute/subacute CVA:  - MR brain noted, possibly embolic  - CTA head and neck unremarkable  - Neurology eval appreciated   - >10 days since diagnosis of CVA.   - Continue ASA, plavix ( NIHSS score < 4, presumed small vessel disease, unless JOVAN positive), stop plavix on 11/28/20.     Tertiary syphilis,. 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 Dced PCN.     Essential hypertension: controlled. lisinopril was discontinued in discharge med list.     Anemia. no active gross bleeding.     Advanced dementia. cont supportive care.     Full code  called son and no response.

## 2020-11-26 NOTE — PROGRESS NOTE ADULT - ASSESSMENT
Subjective Complaints:  Historian:             Vital Signs Last 24 Hrs  T(C): 36.7 (26 Nov 2020 16:39), Max: 36.7 (25 Nov 2020 23:20)  T(F): 98 (26 Nov 2020 16:39), Max: 98.1 (26 Nov 2020 05:00)  HR: 75 (26 Nov 2020 16:39) (70 - 80)  BP: 129/75 (26 Nov 2020 16:39) (100/61 - 129/75)  BP(mean): 93 (26 Nov 2020 16:39) (93 - 93)  RR: 18 (26 Nov 2020 16:39) (18 - 18)  SpO2: 98% (26 Nov 2020 16:39) (97% - 98%)    GENERAL PHYSICAL EXAM:  General:  Appears stated age, well-groomed, well-nourished, no distress  HEENT:  NC/AT, patent nares w/ pink mucosa, OP clear w/o lesions, PERRL, EOMI, conjunctivae clear, no thyromegaly, nodules, adenopathy, no JVD  Chest:  Full & symmetric excursion, no increased effort, breath sounds clear  Cardiovascular:  Regular rhythm, S1, S2, no murmur/rub/S3/S4, no carotid/femoral/abdominal bruit, radial/pedal pulses 2+, no edema  Abdomen:  Soft, non-tender, non-distended, normoactive bowel sounds, no HSM  Extremities:  Gait & station:   Digits:   Nails:   Joints, Bones, Muscles:   ROM:   Stability:  Skin:  No rash/erythema/ecchymoses/petechiae/wounds/abscess/warm/dry  Musculoskeletal:  Full ROM in all joints w/o swelling/tenderness/effusion        LABS:                RADIOLOGY & ADDITIONAL STUDIES:        Neurology Progress Note:      Mental Status: awake alert speech fluent  confused       Cranial Nerves: 2 1.2 intact      Motor:   arm leg 4/5         Sensory: inatct      Cerebellar: defrd      Gait: noataxia       Assesment/Plan: cva dementia  csf vdrl negative  for sub acute rehab  no seizure  arm leg 4/5

## 2020-11-26 NOTE — PROGRESS NOTE ADULT - SUBJECTIVE AND OBJECTIVE BOX
CHIEF COMPLAINT: No overnight events   (limited ROS because of dementia)  no agitation  no diarrhea     PHYSICAL EXAM:    GENERAL: Elderly pleasant   CHEST/LUNG: Clear to ausculation bilaterally, no wheezing, no crackles   HEART: Regular rate and rhythm; No murmurs, rubs  ABDOMEN: Soft, Nontender, Nondistended; Bowel sounds present  EXTREMITIES:  Moving all four extremities spontaneously, No clubbing, cyanosis,  NERVOUS SYSTEM:  able to lift her arms and make a fist bilaterally.   Psychiatry: AAO x name only        OBJECTIVE DATA:       Vital Signs Last 24 Hrs  T(C): 36.4 (2020 10:31), Max: 36.7 (2020 23:20)  T(F): 97.6 (2020 10:31), Max: 98.1 (2020 05:00)  HR: 78 (2020 10:31) (67 - 78)  BP: 108/69 (2020 10:31) (100/61 - 108/78)  BP(mean): --  RR: 18 (2020 10:31) (16 - 18)  SpO2: 98% (2020 10:31) (97% - 98%)           Daily     Daily Weight in k.6 (2020 06:20)    MEDICATIONS  (STANDING):  aspirin  chewable 81 milliGRAM(s) Oral daily  atorvastatin 40 milliGRAM(s) Oral at bedtime  carvedilol 3.125 milliGRAM(s) Oral every 12 hours  chlorhexidine 4% Liquid 1 Application(s) Topical daily  donepezil 5 milliGRAM(s) Oral at bedtime  furosemide    Tablet 40 milliGRAM(s) Oral daily  heparin   Injectable 5000 Unit(s) SubCutaneous every 12 hours  melatonin 3 milliGRAM(s) Oral at bedtime  memantine 10 milliGRAM(s) Oral two times a day  nicotine -   7 mG/24Hr(s) Patch 1 patch Transdermal daily

## 2020-11-27 PROCEDURE — 99232 SBSQ HOSP IP/OBS MODERATE 35: CPT

## 2020-11-27 RX ORDER — SENNA PLUS 8.6 MG/1
2 TABLET ORAL AT BEDTIME
Refills: 0 | Status: DISCONTINUED | OUTPATIENT
Start: 2020-11-27 | End: 2020-12-01

## 2020-11-27 RX ORDER — MAGNESIUM HYDROXIDE 400 MG/1
30 TABLET, CHEWABLE ORAL DAILY
Refills: 0 | Status: DISCONTINUED | OUTPATIENT
Start: 2020-11-27 | End: 2020-12-01

## 2020-11-27 RX ADMIN — SENNA PLUS 2 TABLET(S): 8.6 TABLET ORAL at 21:11

## 2020-11-27 RX ADMIN — Medication 1 PATCH: at 20:14

## 2020-11-27 RX ADMIN — Medication 1 PATCH: at 11:52

## 2020-11-27 RX ADMIN — MAGNESIUM HYDROXIDE 30 MILLILITER(S): 400 TABLET, CHEWABLE ORAL at 19:27

## 2020-11-27 RX ADMIN — HEPARIN SODIUM 5000 UNIT(S): 5000 INJECTION INTRAVENOUS; SUBCUTANEOUS at 05:53

## 2020-11-27 RX ADMIN — MEMANTINE HYDROCHLORIDE 10 MILLIGRAM(S): 10 TABLET ORAL at 05:53

## 2020-11-27 RX ADMIN — Medication 1 PATCH: at 07:10

## 2020-11-27 RX ADMIN — HEPARIN SODIUM 5000 UNIT(S): 5000 INJECTION INTRAVENOUS; SUBCUTANEOUS at 17:49

## 2020-11-27 RX ADMIN — ATORVASTATIN CALCIUM 40 MILLIGRAM(S): 80 TABLET, FILM COATED ORAL at 21:10

## 2020-11-27 RX ADMIN — Medication 3 MILLIGRAM(S): at 21:11

## 2020-11-27 RX ADMIN — Medication 40 MILLIGRAM(S): at 05:53

## 2020-11-27 RX ADMIN — Medication 81 MILLIGRAM(S): at 11:52

## 2020-11-27 RX ADMIN — DONEPEZIL HYDROCHLORIDE 5 MILLIGRAM(S): 10 TABLET, FILM COATED ORAL at 21:10

## 2020-11-27 RX ADMIN — MEMANTINE HYDROCHLORIDE 10 MILLIGRAM(S): 10 TABLET ORAL at 17:49

## 2020-11-27 RX ADMIN — CARVEDILOL PHOSPHATE 3.12 MILLIGRAM(S): 80 CAPSULE, EXTENDED RELEASE ORAL at 05:53

## 2020-11-27 NOTE — PROGRESS NOTE ADULT - SUBJECTIVE AND OBJECTIVE BOX
80 years old female with history of dementia and Hypertension and diabetes was found with wandering with cerebrovascular accident and LBBB with NSTEMI and Acute Systolic Heart Failure. She is lying in bed in NAD.    MEDICATIONS  (STANDING):  aspirin  chewable 81 milliGRAM(s) Oral daily  atorvastatin 40 milliGRAM(s) Oral at bedtime  carvedilol 3.125 milliGRAM(s) Oral every 12 hours  chlorhexidine 4% Liquid 1 Application(s) Topical daily  donepezil 5 milliGRAM(s) Oral at bedtime  furosemide    Tablet 40 milliGRAM(s) Oral daily  heparin   Injectable 5000 Unit(s) SubCutaneous every 12 hours  melatonin 3 milliGRAM(s) Oral at bedtime  memantine 10 milliGRAM(s) Oral two times a day  nicotine -   7 mG/24Hr(s) Patch 1 patch Transdermal daily  senna 2 Tablet(s) Oral at bedtime    MEDICATIONS  (PRN):  magnesium hydroxide Suspension 30 milliLiter(s) Oral daily PRN Constipation      Allergies    No Known Allergies    Intolerances        Vital Signs Last 24 Hrs  T(C): 36.6 (27 Nov 2020 16:45), Max: 36.8 (27 Nov 2020 05:12)  T(F): 97.8 (27 Nov 2020 16:45), Max: 98.2 (27 Nov 2020 05:12)  HR: 80 (27 Nov 2020 16:45) (67 - 80)  BP: 104/72 (27 Nov 2020 16:45) (104/72 - 124/77)  BP(mean): --  RR: 18 (27 Nov 2020 16:45) (18 - 18)  SpO2: 98% (27 Nov 2020 16:45) (98% - 99%)    PHYSICAL EXAM:  GENERAL: NAD, well-groomed, well-developed  HEAD:  Atraumatic, Normocephalic  EYES: EOMI, PERRLA   NECK: Supple   NERVOUS SYSTEM:  Alert   CHEST/LUNG: Clear to auscultation bilaterally; No rales, rhonchi, wheezing, or rubs  HEART: Regular rate and rhythm; No murmurs, rubs, or gallops  ABDOMEN: Soft, Nontender, Nondistended; Bowel sounds present  EXTREMITIES:  No clubbing, cyanosis, or edema     LABS:     RADIOLOGY & ADDITIONAL TESTS:    11-26-20 @ 07:01  -  11-27-20 @ 07:00  --------------------------------------------------------  IN:    Oral Fluid: 920 mL  Total IN: 920 mL    OUT:  Total OUT: 0 mL    Total NET: 920 mL      11-27-20 @ 07:01  -  11-27-20 @ 20:39  --------------------------------------------------------  IN:    Oral Fluid: 200 mL  Total IN: 200 mL    OUT:  Total OUT: 0 mL    Total NET: 200 mL

## 2020-11-27 NOTE — PROGRESS NOTE ADULT - ASSESSMENT
80 years old female with history of dementia and Hypertension and diabetes was found with wandering with cerebrovascular accident and LBBB with NSTEMI and Acute Systolic Heart Failure.    Acute Systolic Heart Failure/ NSTEMI  - Echo with EF<20 %, dilated cardiomyopathy w/ grade I diastolic dysfunction  - S/P heparin gtt   - c/w ASA    - c/w Lipitor  - No plan for JOVAN per cards  - Stress test cancelled due to acute CVA  - off Lisinopril because of risk of hypotension  - c/w low dose Coreg & lasix    Anemia  - Microcytic anemia  - Neg occult blood  - Iron studies not consistent with iron deficient   - Hb stable, outpt follow up and work up    Acute/subacute CVA  - MR brain showed an acute and/or recent subacute infarct and infarcts with diffusion abnormalities in both occipital lobes, as well as within the cerebellum as well as a small recent infarct in the right frontal white matter suggesting embolic or thrombotic showering - will get bubble study  - CTA head and neck unremarkable  - carotid US showed no evidence of hemodynamically significant internal carotid artery stenosis  - Neurology eval appreciated   - >10 days since diagnosis of CVA  - c/w ASA & simvastatin    Enlarged thyroid gland containing bilateral nodules and calcifications on CT  - will get thyroid ultrasound    Tertiary syphilis  - RPR positive  - CSF negative  - ruled out  - Penicillin stopped     Essential hypertension  - c/w lisinopril       Advanced dementia   - c/w Aricept and Namenda    Prophylaxis:  DVT: Heparin  GI: Po diet 80 years old female with history of dementia and Hypertension and diabetes was found with wandering with cerebrovascular accident and LBBB with NSTEMI and Acute Systolic Heart Failure.    Acute Systolic Heart Failure/ NSTEMI  - Echo with EF<20 %, dilated cardiomyopathy w/ grade I diastolic dysfunction  - c/w low dose Coreg & Lasix  - Lisinopril stopped because of risk of hypotension    status post NSTEMI  - trop peaked at 27.2  - s/p heparin gtt   - c/w ASA & Lipitor  - Stress test cancelled due to acute CVA, can be done as outpatient as per cardio    Anemia  - Microcytic anemia  - Neg occult blood  - Iron studies not consistent with iron deficient   - Hb stable, outpt follow up and work up    Acute/subacute CVA  - MR brain showed an acute and/or recent subacute infarct and infarcts with diffusion abnormalities in both occipital lobes, as well as within the cerebellum as well as a small recent infarct in the right frontal white matter suggesting embolic or thrombotic showering - CTA head and neck unremarkable  - carotid US showed no evidence of hemodynamically significant internal carotid artery stenosis  - Neurology eval appreciated   - >10 days since diagnosis of CVA  - c/w ASA & simvastatin  - no JOVAN as per cardio  - will get bubble study    Enlarged thyroid gland containing bilateral nodules and calcifications on CT  - will get thyroid ultrasound    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    Acute Systolic Heart Failure   - Echo with EF<20 %, dilated cardiomyopathy w/ grade I diastolic dysfunction  - c/w low dose Coreg & Lasix  - Lisinopril stopped because of risk of hypotension    Status post NSTEMI  - trop peaked at 27.2  - s/p heparin gtt   - c/w ASA & Lipitor  - Stress test cancelled due to acute CVA, can be done as outpatient as per cardio    Anemia  - Microcytic anemia  - Neg occult blood  - Iron studies not consistent with iron deficient   - Hb stable, outpt follow up and work up    Acute/subacute CVA  - MR brain showed an acute and/or recent subacute infarct and infarcts with diffusion abnormalities in both occipital lobes, as well as within the cerebellum as well as a small recent infarct in the right frontal white matter suggesting embolic or thrombotic showering - CTA head and neck unremarkable  - carotid US showed no evidence of hemodynamically significant internal carotid artery stenosis  - Neurology eval appreciated   - >10 days since diagnosis of CVA  - c/w ASA & simvastatin  - no JOVAN as per cardio  - will get bubble study    Enlarged thyroid gland containing bilateral nodules and calcifications on CT  - will get thyroid ultrasound    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

## 2020-11-28 LAB
ALBUMIN SERPL ELPH-MCNC: 3.2 G/DL — LOW (ref 3.3–5)
ALP SERPL-CCNC: 67 U/L — SIGNIFICANT CHANGE UP (ref 40–120)
ALT FLD-CCNC: 19 U/L — SIGNIFICANT CHANGE UP (ref 12–78)
ANION GAP SERPL CALC-SCNC: 5 MMOL/L — SIGNIFICANT CHANGE UP (ref 5–17)
AST SERPL-CCNC: 14 U/L — LOW (ref 15–37)
BILIRUB SERPL-MCNC: 0.4 MG/DL — SIGNIFICANT CHANGE UP (ref 0.2–1.2)
BUN SERPL-MCNC: 31 MG/DL — HIGH (ref 7–23)
CALCIUM SERPL-MCNC: 9 MG/DL — SIGNIFICANT CHANGE UP (ref 8.5–10.1)
CHLORIDE SERPL-SCNC: 104 MMOL/L — SIGNIFICANT CHANGE UP (ref 96–108)
CO2 SERPL-SCNC: 30 MMOL/L — SIGNIFICANT CHANGE UP (ref 22–31)
CREAT SERPL-MCNC: 0.83 MG/DL — SIGNIFICANT CHANGE UP (ref 0.5–1.3)
GLUCOSE SERPL-MCNC: 86 MG/DL — SIGNIFICANT CHANGE UP (ref 70–99)
HCT VFR BLD CALC: 31.1 % — LOW (ref 34.5–45)
HGB BLD-MCNC: 9.6 G/DL — LOW (ref 11.5–15.5)
MAGNESIUM SERPL-MCNC: 2.5 MG/DL — SIGNIFICANT CHANGE UP (ref 1.6–2.6)
MCHC RBC-ENTMCNC: 24.5 PG — LOW (ref 27–34)
MCHC RBC-ENTMCNC: 30.9 GM/DL — LOW (ref 32–36)
MCV RBC AUTO: 79.3 FL — LOW (ref 80–100)
NRBC # BLD: 0 /100 WBCS — SIGNIFICANT CHANGE UP (ref 0–0)
PHOSPHATE SERPL-MCNC: 3 MG/DL — SIGNIFICANT CHANGE UP (ref 2.5–4.5)
PLATELET # BLD AUTO: 236 K/UL — SIGNIFICANT CHANGE UP (ref 150–400)
POTASSIUM SERPL-MCNC: 3.8 MMOL/L — SIGNIFICANT CHANGE UP (ref 3.5–5.3)
POTASSIUM SERPL-SCNC: 3.8 MMOL/L — SIGNIFICANT CHANGE UP (ref 3.5–5.3)
PROT SERPL-MCNC: 6.7 GM/DL — SIGNIFICANT CHANGE UP (ref 6–8.3)
RBC # BLD: 3.92 M/UL — SIGNIFICANT CHANGE UP (ref 3.8–5.2)
RBC # FLD: 14.6 % — HIGH (ref 10.3–14.5)
SODIUM SERPL-SCNC: 139 MMOL/L — SIGNIFICANT CHANGE UP (ref 135–145)
WBC # BLD: 9.47 K/UL — SIGNIFICANT CHANGE UP (ref 3.8–10.5)
WBC # FLD AUTO: 9.47 K/UL — SIGNIFICANT CHANGE UP (ref 3.8–10.5)

## 2020-11-28 PROCEDURE — 93306 TTE W/DOPPLER COMPLETE: CPT | Mod: 26

## 2020-11-28 PROCEDURE — 99232 SBSQ HOSP IP/OBS MODERATE 35: CPT

## 2020-11-28 PROCEDURE — 76536 US EXAM OF HEAD AND NECK: CPT | Mod: 26

## 2020-11-28 RX ORDER — LACTULOSE 10 G/15ML
20 SOLUTION ORAL
Refills: 0 | Status: COMPLETED | OUTPATIENT
Start: 2020-11-28 | End: 2020-11-30

## 2020-11-28 RX ORDER — POLYETHYLENE GLYCOL 3350 17 G/17G
17 POWDER, FOR SOLUTION ORAL DAILY
Refills: 0 | Status: DISCONTINUED | OUTPATIENT
Start: 2020-11-28 | End: 2020-12-01

## 2020-11-28 RX ADMIN — ATORVASTATIN CALCIUM 40 MILLIGRAM(S): 80 TABLET, FILM COATED ORAL at 21:41

## 2020-11-28 RX ADMIN — Medication 1 PATCH: at 10:54

## 2020-11-28 RX ADMIN — HEPARIN SODIUM 5000 UNIT(S): 5000 INJECTION INTRAVENOUS; SUBCUTANEOUS at 06:36

## 2020-11-28 RX ADMIN — MEMANTINE HYDROCHLORIDE 10 MILLIGRAM(S): 10 TABLET ORAL at 06:36

## 2020-11-28 RX ADMIN — CARVEDILOL PHOSPHATE 3.12 MILLIGRAM(S): 80 CAPSULE, EXTENDED RELEASE ORAL at 06:36

## 2020-11-28 RX ADMIN — HEPARIN SODIUM 5000 UNIT(S): 5000 INJECTION INTRAVENOUS; SUBCUTANEOUS at 18:24

## 2020-11-28 RX ADMIN — Medication 3 MILLIGRAM(S): at 21:41

## 2020-11-28 RX ADMIN — LACTULOSE 20 GRAM(S): 10 SOLUTION ORAL at 18:31

## 2020-11-28 RX ADMIN — MAGNESIUM HYDROXIDE 30 MILLILITER(S): 400 TABLET, CHEWABLE ORAL at 10:31

## 2020-11-28 RX ADMIN — Medication 1 PATCH: at 08:00

## 2020-11-28 RX ADMIN — POLYETHYLENE GLYCOL 3350 17 GRAM(S): 17 POWDER, FOR SOLUTION ORAL at 11:00

## 2020-11-28 RX ADMIN — DONEPEZIL HYDROCHLORIDE 5 MILLIGRAM(S): 10 TABLET, FILM COATED ORAL at 21:41

## 2020-11-28 RX ADMIN — MEMANTINE HYDROCHLORIDE 10 MILLIGRAM(S): 10 TABLET ORAL at 18:24

## 2020-11-28 RX ADMIN — Medication 81 MILLIGRAM(S): at 10:31

## 2020-11-28 RX ADMIN — Medication 40 MILLIGRAM(S): at 06:36

## 2020-11-28 RX ADMIN — Medication 1 PATCH: at 10:32

## 2020-11-28 NOTE — PROGRESS NOTE ADULT - SUBJECTIVE AND OBJECTIVE BOX
80 years old female with history of dementia and Hypertension and diabetes was found with wandering with cerebrovascular accident and LBBB with NSTEMI and Acute Systolic Heart Failure. She is lying in bed in NAD.    MEDICATIONS  (STANDING):  aspirin  chewable 81 milliGRAM(s) Oral daily  atorvastatin 40 milliGRAM(s) Oral at bedtime  carvedilol 3.125 milliGRAM(s) Oral every 12 hours  chlorhexidine 4% Liquid 1 Application(s) Topical daily  donepezil 5 milliGRAM(s) Oral at bedtime  furosemide    Tablet 40 milliGRAM(s) Oral daily  heparin   Injectable 5000 Unit(s) SubCutaneous every 12 hours  melatonin 3 milliGRAM(s) Oral at bedtime  memantine 10 milliGRAM(s) Oral two times a day  nicotine -   7 mG/24Hr(s) Patch 1 patch Transdermal daily  polyethylene glycol 3350 17 Gram(s) Oral daily  senna 2 Tablet(s) Oral at bedtime    MEDICATIONS  (PRN):  magnesium hydroxide Suspension 30 milliLiter(s) Oral daily PRN Constipation      Allergies    No Known Allergies    Intolerances        Vital Signs Last 24 Hrs  T(C): 36.3 (28 Nov 2020 10:54), Max: 36.8 (28 Nov 2020 00:02)  T(F): 97.4 (28 Nov 2020 10:54), Max: 98.2 (28 Nov 2020 00:02)  HR: 77 (28 Nov 2020 11:20) (69 - 85)  BP: 110/70 (28 Nov 2020 11:20) (110/70 - 135/79)  BP(mean): 98 (28 Nov 2020 10:54) (98 - 98)  RR: 19 (28 Nov 2020 10:54) (18 - 19)  SpO2: 97% (28 Nov 2020 11:20) (96% - 98%)    PHYSICAL EXAM:  GENERAL: NAD, well-groomed, well-developed  HEAD:  Atraumatic, Normocephalic  EYES: EOMI, PERRLA   NECK: Supple   NERVOUS SYSTEM:  Alert   CHEST/LUNG: Clear to auscultation bilaterally; No rales, rhonchi, wheezing, or rubs  HEART: Regular rate and rhythm; No murmurs, rubs, or gallops  ABDOMEN: Soft, Nontender, Nondistended; Bowel sounds present  EXTREMITIES:  No clubbing, cyanosis, or edema      LABS:                        9.6    9.47  )-----------( 236      ( 28 Nov 2020 07:08 )             31.1     11-28    139  |  104  |  31<H>  ----------------------------<  86  3.8   |  30  |  0.83    Ca    9.0      28 Nov 2020 07:08  Phos  3.0     11-28  Mg     2.5     11-28    TPro  6.7  /  Alb  3.2<L>  /  TBili  0.4  /  DBili  x   /  AST  14<L>  /  ALT  19  /  AlkPhos  67  11-28           RADIOLOGY & ADDITIONAL TESTS:    11-27-20 @ 07:01  -  11-28-20 @ 07:00  --------------------------------------------------------  IN:    Oral Fluid: 400 mL  Total IN: 400 mL    OUT:  Total OUT: 0 mL    Total NET: 400 mL

## 2020-11-28 NOTE — PROGRESS NOTE ADULT - ASSESSMENT
80 years old female with history of dementia and Hypertension and diabetes was found with wandering with cerebrovascular accident and LBBB with NSTEMI and Acute Systolic Heart Failure.    Acute Systolic Heart Failure   - Echo with EF<20 %, dilated cardiomyopathy w/ grade I diastolic dysfunction  - c/w low dose Coreg & Lasix  - Lisinopril stopped because of risk of hypotension    Status post NSTEMI  - trop peaked at 27.2  - s/p heparin gtt   - c/w ASA & Lipitor  - Stress test cancelled due to acute CVA, can be done as outpatient as per cardio    Anemia  - Microcytic anemia  - Neg occult blood  - Iron studies not consistent with iron deficient   - Hb stable, outpt follow up and work up    Acute/subacute CVA  - MR brain showed an acute and/or recent subacute infarct and infarcts with diffusion abnormalities in both occipital lobes, as well as within the cerebellum as well as a small recent infarct in the right frontal white matter suggesting embolic or thrombotic showering - CTA head and neck unremarkable  - carotid US showed no evidence of hemodynamically significant internal carotid artery stenosis  - Neurology eval appreciated   - >10 days since diagnosis of CVA  - c/w ASA & simvastatin  - no JOVAN as per cardio  - bubble study done, read pending    Enlarged thyroid gland containing bilateral nodules and calcifications on CT  - thyroid ultrasound showed an enlarged thyroid gland containing multiple bilateral thyroid nodules    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

## 2020-11-28 NOTE — CHART NOTE - NSCHARTNOTEFT_GEN_A_CORE
PA Bubble Study Note      Called to perform a Bubble Study on this 79yo Female    Materials used:   3 10cc Syringes  1 3-way stopcock  2cc air    10cc syringe filled with 8cc saline and 2cc air. Attached to stopcock, with a separate 10cc empty syringe attached to other end.  Checked right IV was patent with injecting 5cc saline flush; no resistance.    With the Echo tech's verbal that she/he was ready for the solution, I mixed rapidly the saline/air mix to obtain appropriate bubbles needed and injected the mixture of air/saline rapidly into patients IV.     This was repeated 3 times.    IV was flushed with additional 5cc saline at the end of the study.    Pt tolerated this procedure well    Georgette Flowers, PAC  Medicine PA

## 2020-11-29 PROCEDURE — 99232 SBSQ HOSP IP/OBS MODERATE 35: CPT

## 2020-11-29 RX ADMIN — HEPARIN SODIUM 5000 UNIT(S): 5000 INJECTION INTRAVENOUS; SUBCUTANEOUS at 05:10

## 2020-11-29 RX ADMIN — DONEPEZIL HYDROCHLORIDE 5 MILLIGRAM(S): 10 TABLET, FILM COATED ORAL at 21:13

## 2020-11-29 RX ADMIN — Medication 40 MILLIGRAM(S): at 05:10

## 2020-11-29 RX ADMIN — MEMANTINE HYDROCHLORIDE 10 MILLIGRAM(S): 10 TABLET ORAL at 05:10

## 2020-11-29 RX ADMIN — Medication 3 MILLIGRAM(S): at 21:13

## 2020-11-29 RX ADMIN — Medication 1 PATCH: at 20:40

## 2020-11-29 RX ADMIN — Medication 1 PATCH: at 11:38

## 2020-11-29 RX ADMIN — Medication 1 PATCH: at 11:39

## 2020-11-29 RX ADMIN — ATORVASTATIN CALCIUM 40 MILLIGRAM(S): 80 TABLET, FILM COATED ORAL at 21:13

## 2020-11-29 RX ADMIN — POLYETHYLENE GLYCOL 3350 17 GRAM(S): 17 POWDER, FOR SOLUTION ORAL at 11:38

## 2020-11-29 RX ADMIN — CHLORHEXIDINE GLUCONATE 1 APPLICATION(S): 213 SOLUTION TOPICAL at 11:38

## 2020-11-29 RX ADMIN — MEMANTINE HYDROCHLORIDE 10 MILLIGRAM(S): 10 TABLET ORAL at 18:04

## 2020-11-29 RX ADMIN — SENNA PLUS 2 TABLET(S): 8.6 TABLET ORAL at 21:13

## 2020-11-29 RX ADMIN — HEPARIN SODIUM 5000 UNIT(S): 5000 INJECTION INTRAVENOUS; SUBCUTANEOUS at 18:04

## 2020-11-29 RX ADMIN — Medication 81 MILLIGRAM(S): at 11:38

## 2020-11-29 NOTE — PROGRESS NOTE ADULT - SUBJECTIVE AND OBJECTIVE BOX
80 years old female with history of dementia and Hypertension and diabetes was found with wandering with cerebrovascular accident and LBBB with NSTEMI and Acute Systolic Heart Failure. She is lying in bed in NAD.      MEDICATIONS  (STANDING):  aspirin  chewable 81 milliGRAM(s) Oral daily  atorvastatin 40 milliGRAM(s) Oral at bedtime  carvedilol 3.125 milliGRAM(s) Oral every 12 hours  chlorhexidine 4% Liquid 1 Application(s) Topical daily  donepezil 5 milliGRAM(s) Oral at bedtime  furosemide Tablet 40 milliGRAM(s) Oral daily  heparin   Injectable 5000 Unit(s) SubCutaneous every 12 hours  lactulose Syrup 20 Gram(s) Oral two times a day  melatonin 3 milliGRAM(s) Oral at bedtime  memantine 10 milliGRAM(s) Oral two times a day  nicotine -   7 mG/24Hr(s) Patch 1 patch Transdermal daily  polyethylene glycol 3350 17 Gram(s) Oral daily  senna 2 Tablet(s) Oral at bedtime    MEDICATIONS  (PRN):  magnesium hydroxide Suspension 30 milliLiter(s) Oral daily PRN Constipation      Allergies    No Known Allergies    Intolerances        Vital Signs Last 24 Hrs  T(C): 36.7 (29 Nov 2020 16:30), Max: 37 (28 Nov 2020 23:28)  T(F): 98 (29 Nov 2020 16:30), Max: 98.6 (28 Nov 2020 23:28)  HR: 90 (29 Nov 2020 16:30) (62 - 90)  BP: 107/66 (29 Nov 2020 16:30) (104/61 - 121/74)  BP(mean): 89 (29 Nov 2020 10:45) (89 - 89)  RR: 18 (29 Nov 2020 16:30) (16 - 18)  SpO2: 96% (29 Nov 2020 16:30) (95% - 97%)    PHYSICAL EXAM:  GENERAL: NAD, well-groomed, well-developed  HEAD:  Atraumatic, Normocephalic  EYES: EOMI, PERRLA   NECK: Supple   NERVOUS SYSTEM:  Alert   CHEST/LUNG: Clear to auscultation bilaterally; No rales, rhonchi, wheezing, or rubs  HEART: Regular rate and rhythm; No murmurs, rubs, or gallops  ABDOMEN: Soft, Nontender, Nondistended; Bowel sounds present  EXTREMITIES:  No clubbing, cyanosis, or edema      LABS:                        9.6    9.47  )-----------( 236      ( 28 Nov 2020 07:08 )             31.1     11-28    139  |  104  |  31<H>  ----------------------------<  86  3.8   |  30  |  0.83    Ca    9.0      28 Nov 2020 07:08  Phos  3.0     11-28  Mg     2.5     11-28    TPro  6.7  /  Alb  3.2<L>  /  TBili  0.4  /  DBili  x   /  AST  14<L>  /  ALT  19  /  AlkPhos  67  11-28       RADIOLOGY & ADDITIONAL TESTS:

## 2020-11-29 NOTE — PROGRESS NOTE ADULT - ASSESSMENT
Subjective Complaints:  Historian:             Vital Signs Last 24 Hrs  T(C): 36.7 (29 Nov 2020 16:30), Max: 37 (28 Nov 2020 23:28)  T(F): 98 (29 Nov 2020 16:30), Max: 98.6 (28 Nov 2020 23:28)  HR: 90 (29 Nov 2020 16:30) (62 - 90)  BP: 107/66 (29 Nov 2020 16:30) (104/61 - 121/74)  BP(mean): 89 (29 Nov 2020 10:45) (89 - 89)  RR: 18 (29 Nov 2020 16:30) (16 - 18)  SpO2: 96% (29 Nov 2020 16:30) (95% - 97%)    GENERAL PHYSICAL EXAM:  General:  Appears stated age, well-groomed, well-nourished, no distress  HEENT:  NC/AT, patent nares w/ pink mucosa, OP clear w/o lesions, PERRL, EOMI, conjunctivae clear, no thyromegaly, nodules, adenopathy, no JVD  Chest:  Full & symmetric excursion, no increased effort, breath sounds clear  Cardiovascular:  Regular rhythm, S1, S2, no murmur/rub/S3/S4, no carotid/femoral/abdominal bruit, radial/pedal pulses 2+, no edema  Abdomen:  Soft, non-tender, non-distended, normoactive bowel sounds, no HSM  Extremities:  Gait & station:   Digits:   Nails:   Joints, Bones, Muscles:   ROM:   Stability:  Skin:  No rash/erythema/ecchymoses/petechiae/wounds/abscess/warm/dry  Musculoskeletal:  Full ROM in all joints w/o swelling/tenderness/effusion        LABS:                        9.6    9.47  )-----------( 236      ( 28 Nov 2020 07:08 )             31.1     11-28    139  |  104  |  31<H>  ----------------------------<  86  3.8   |  30  |  0.83    Ca    9.0      28 Nov 2020 07:08  Phos  3.0     11-28  Mg     2.5     11-28    TPro  6.7  /  Alb  3.2<L>  /  TBili  0.4  /  DBili  x   /  AST  14<L>  /  ALT  19  /  AlkPhos  67  11-28          RADIOLOGY & ADDITIONAL STUDIES:        Neurology Progress Note:      Mental Status: awaek alert speech fluent       Cranial Nerves: 2 1/2 intact      Motor:   arm leg 4/5         Sensory: intact      Cerebellar: defrd      Gait: no ataxia       Assesment/Plan: cva dementia  csf vdrl negative  no seizure for rehab at home

## 2020-11-29 NOTE — PROGRESS NOTE ADULT - ASSESSMENT
80 years old female with history of dementia and Hypertension and diabetes was found with wandering with cerebrovascular accident and LBBB with NSTEMI and Acute Systolic Heart Failure.    Acute Systolic Heart Failure   - Echo with EF<20 %, dilated cardiomyopathy w/ grade I diastolic dysfunction  - c/w low dose Coreg & Lasix  - Lisinopril stopped because of risk of hypotension    Status post NSTEMI  - trop peaked at 27.2  - s/p heparin gtt   - c/w ASA & Lipitor  - Stress test cancelled due to acute CVA, can be done as outpatient as per cardio    Anemia  - Microcytic anemia  - Neg occult blood  - Iron studies not consistent with iron deficient   - Hb stable, outpt follow up and work up    Acute/subacute CVA  - MR brain showed an acute and/or recent subacute infarct and infarcts with diffusion abnormalities in both occipital lobes, as well as within the cerebellum as well as a small recent infarct in the right frontal white matter suggesting embolic or thrombotic showering - CTA head and neck unremarkable  - carotid US showed no evidence of hemodynamically significant internal carotid artery stenosis  - Neurology eval appreciated   - >10 days since diagnosis of CVA  - c/w ASA & simvastatin  - no JOVAN as per cardio  - bubble study done, read pending    Enlarged thyroid gland containing bilateral nodules and calcifications on CT  - thyroid ultrasound showed an enlarged thyroid gland containing multiple bilateral thyroid nodules    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    Acute Systolic Heart Failure   - Echo with EF<20 %, dilated cardiomyopathy w/ grade I diastolic dysfunction  - c/w low dose Coreg & Lasix  - Lisinopril stopped because of risk of hypotension    Status post NSTEMI  - trop peaked at 27.2  - s/p heparin gtt   - c/w ASA & Lipitor  - Stress test cancelled due to acute CVA, can be done as outpatient as per cardio    Anemia  - Microcytic anemia  - Neg occult blood  - Iron studies not consistent with iron deficient   - Hb stable, outpt follow up and work up    Acute/subacute CVA  - MR brain showed an acute and/or recent subacute infarct and infarcts with diffusion abnormalities in both occipital lobes, as well as within the cerebellum as well as a small recent infarct in the right frontal white matter suggesting embolic or thrombotic showering - CTA head and neck unremarkable  - carotid US showed no evidence of hemodynamically significant internal carotid artery stenosis  - Neurology eval appreciated   - >10 days since diagnosis of CVA  - c/w ASA & simvastatin  - bubble study showed an intact intra atrial septum w/ no evidence of any thrombus  - no JOVAN as per cardio    Enlarged thyroid gland containing bilateral nodules and calcifications on CT  - thyroid ultrasound showed an enlarged thyroid gland containing multiple bilateral thyroid nodules    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

## 2020-11-30 LAB
ANION GAP SERPL CALC-SCNC: 5 MMOL/L — SIGNIFICANT CHANGE UP (ref 5–17)
BUN SERPL-MCNC: 30 MG/DL — HIGH (ref 7–23)
CALCIUM SERPL-MCNC: 9.3 MG/DL — SIGNIFICANT CHANGE UP (ref 8.5–10.1)
CHLORIDE SERPL-SCNC: 105 MMOL/L — SIGNIFICANT CHANGE UP (ref 96–108)
CO2 SERPL-SCNC: 32 MMOL/L — HIGH (ref 22–31)
CREAT SERPL-MCNC: 0.95 MG/DL — SIGNIFICANT CHANGE UP (ref 0.5–1.3)
GLUCOSE SERPL-MCNC: 101 MG/DL — HIGH (ref 70–99)
MAGNESIUM SERPL-MCNC: 2.3 MG/DL — SIGNIFICANT CHANGE UP (ref 1.6–2.6)
PHOSPHATE SERPL-MCNC: 4.2 MG/DL — SIGNIFICANT CHANGE UP (ref 2.5–4.5)
POTASSIUM SERPL-MCNC: 3.8 MMOL/L — SIGNIFICANT CHANGE UP (ref 3.5–5.3)
POTASSIUM SERPL-SCNC: 3.8 MMOL/L — SIGNIFICANT CHANGE UP (ref 3.5–5.3)
SODIUM SERPL-SCNC: 142 MMOL/L — SIGNIFICANT CHANGE UP (ref 135–145)
TSH SERPL-MCNC: 0.26 UU/ML — LOW (ref 0.36–3.74)

## 2020-11-30 PROCEDURE — 99232 SBSQ HOSP IP/OBS MODERATE 35: CPT

## 2020-11-30 RX ADMIN — Medication 1 PATCH: at 12:31

## 2020-11-30 RX ADMIN — POLYETHYLENE GLYCOL 3350 17 GRAM(S): 17 POWDER, FOR SOLUTION ORAL at 12:35

## 2020-11-30 RX ADMIN — MEMANTINE HYDROCHLORIDE 10 MILLIGRAM(S): 10 TABLET ORAL at 17:53

## 2020-11-30 RX ADMIN — Medication 1 PATCH: at 10:00

## 2020-11-30 RX ADMIN — HEPARIN SODIUM 5000 UNIT(S): 5000 INJECTION INTRAVENOUS; SUBCUTANEOUS at 05:30

## 2020-11-30 RX ADMIN — HEPARIN SODIUM 5000 UNIT(S): 5000 INJECTION INTRAVENOUS; SUBCUTANEOUS at 17:53

## 2020-11-30 RX ADMIN — Medication 40 MILLIGRAM(S): at 05:30

## 2020-11-30 RX ADMIN — CHLORHEXIDINE GLUCONATE 1 APPLICATION(S): 213 SOLUTION TOPICAL at 12:31

## 2020-11-30 RX ADMIN — Medication 3 MILLIGRAM(S): at 21:10

## 2020-11-30 RX ADMIN — ATORVASTATIN CALCIUM 40 MILLIGRAM(S): 80 TABLET, FILM COATED ORAL at 21:10

## 2020-11-30 RX ADMIN — Medication 81 MILLIGRAM(S): at 12:33

## 2020-11-30 RX ADMIN — SENNA PLUS 2 TABLET(S): 8.6 TABLET ORAL at 21:10

## 2020-11-30 RX ADMIN — MEMANTINE HYDROCHLORIDE 10 MILLIGRAM(S): 10 TABLET ORAL at 05:30

## 2020-11-30 RX ADMIN — Medication 1 PATCH: at 17:52

## 2020-11-30 RX ADMIN — LACTULOSE 20 GRAM(S): 10 SOLUTION ORAL at 05:31

## 2020-11-30 RX ADMIN — DONEPEZIL HYDROCHLORIDE 5 MILLIGRAM(S): 10 TABLET, FILM COATED ORAL at 21:10

## 2020-11-30 NOTE — PROGRESS NOTE ADULT - ASSESSMENT
80 years old female with history of dementia and Hypertension and diabetes was found with wandering with cerebrovascular accident and LBBB with NSTEMI and Acute Systolic Heart Failure.    Acute Systolic Heart Failure   - Echo with EF<20 %, dilated cardiomyopathy w/ grade I diastolic dysfunction  - c/w low dose Coreg & Lasix  - Lisinopril stopped because of risk of hypotension    Status post NSTEMI  - trop peaked at 27.2  - s/p heparin gtt   - c/w ASA & Lipitor  - Stress test cancelled due to acute CVA, can be done as outpatient as per cardio    Anemia  - Microcytic anemia  - Neg occult blood  - Iron studies not consistent with iron deficient   - Hb stable, outpt follow up and work up    Acute/subacute CVA  - MR brain showed an acute and/or recent subacute infarct and infarcts with diffusion abnormalities in both occipital lobes, as well as within the cerebellum as well as a small recent infarct in the right frontal white matter suggesting embolic or thrombotic showering - CTA head and neck unremarkable  - carotid US showed no evidence of hemodynamically significant internal carotid artery stenosis  - Neurology eval appreciated   - >10 days since diagnosis of CVA  - c/w ASA & simvastatin  - bubble study showed an intact intra atrial septum w/ no evidence of any thrombus  - no JOVAN as per cardio    Enlarged thyroid gland containing bilateral nodules and calcifications on CT  - thyroid ultrasound showed an enlarged thyroid gland containing multiple bilateral thyroid nodules  - TSH 0.264, free T4 is pending     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

## 2020-11-30 NOTE — PROGRESS NOTE ADULT - ASSESSMENT
Subjective Complaints:  Historian:             Vital Signs Last 24 Hrs  T(C): 36.8 (30 Nov 2020 16:00), Max: 37.3 (30 Nov 2020 05:07)  T(F): 98.3 (30 Nov 2020 16:00), Max: 99.2 (30 Nov 2020 05:07)  HR: 85 (30 Nov 2020 16:20) (73 - 85)  BP: 104/56 (30 Nov 2020 16:20) (102/65 - 106/58)  BP(mean): --  RR: 18 (30 Nov 2020 16:20) (17 - 18)  SpO2: 95% (30 Nov 2020 16:20) (93% - 98%)    GENERAL PHYSICAL EXAM:  General:  Appears stated age, well-groomed, well-nourished, no distress  HEENT:  NC/AT, patent nares w/ pink mucosa, OP clear w/o lesions, PERRL, EOMI, conjunctivae clear, no thyromegaly, nodules, adenopathy, no JVD  Chest:  Full & symmetric excursion, no increased effort, breath sounds clear  Cardiovascular:  Regular rhythm, S1, S2, no murmur/rub/S3/S4, no carotid/femoral/abdominal bruit, radial/pedal pulses 2+, no edema  Abdomen:  Soft, non-tender, non-distended, normoactive bowel sounds, no HSM  Extremities:  Gait & station:   Digits:   Nails:   Joints, Bones, Muscles:   ROM:   Stability:  Skin:  No rash/erythema/ecchymoses/petechiae/wounds/abscess/warm/dry  Musculoskeletal:  Full ROM in all joints w/o swelling/tenderness/effusion        LABS:    11-30    142  |  105  |  30<H>  ----------------------------<  101<H>  3.8   |  32<H>  |  0.95    Ca    9.3      30 Nov 2020 12:50  Phos  4.2     11-30  Mg     2.3     11-30            RADIOLOGY & ADDITIONAL STUDIES:        Neurology Progress Note:      Mental Status: awaek alert speech fluent confused dementia       Cranial Nerves: 2 1/2 intact      Motor:   arm leg 4/5         Sensory: intact      Cerebellar: defrd      Gait: no ataxia       Assesment/Plan: cva dementia csf  vdrl negative for placement rehab at home on [facility]

## 2020-11-30 NOTE — PROGRESS NOTE ADULT - REASON FOR ADMISSION
Altered Mental Status

## 2020-11-30 NOTE — PROGRESS NOTE ADULT - SUBJECTIVE AND OBJECTIVE BOX
80 years old female with history of dementia and Hypertension and diabetes was found with wandering with cerebrovascular accident and LBBB with NSTEMI and Acute Systolic Heart Failure. She is lying in bed in NAD.     MEDICATIONS  (STANDING):  aspirin  chewable 81 milliGRAM(s) Oral daily  atorvastatin 40 milliGRAM(s) Oral at bedtime  carvedilol 3.125 milliGRAM(s) Oral every 12 hours  chlorhexidine 4% Liquid 1 Application(s) Topical daily  donepezil 5 milliGRAM(s) Oral at bedtime  furosemide    Tablet 40 milliGRAM(s) Oral daily  heparin   Injectable 5000 Unit(s) SubCutaneous every 12 hours  melatonin 3 milliGRAM(s) Oral at bedtime  memantine 10 milliGRAM(s) Oral two times a day  nicotine -   7 mG/24Hr(s) Patch 1 patch Transdermal daily  polyethylene glycol 3350 17 Gram(s) Oral daily  senna 2 Tablet(s) Oral at bedtime    MEDICATIONS  (PRN):  magnesium hydroxide Suspension 30 milliLiter(s) Oral daily PRN Constipation      Allergies    No Known Allergies       Vital Signs Last 24 Hrs  T(C): 36.8 (30 Nov 2020 16:00), Max: 37.3 (30 Nov 2020 05:07)  T(F): 98.3 (30 Nov 2020 16:00), Max: 99.2 (30 Nov 2020 05:07)  HR: 85 (30 Nov 2020 16:20) (73 - 85)  BP: 104/56 (30 Nov 2020 16:20) (102/65 - 106/58)  BP(mean): --  RR: 18 (30 Nov 2020 16:20) (17 - 18)  SpO2: 95% (30 Nov 2020 16:20) (93% - 98%)    PHYSICAL EXAM:  GENERAL: NAD, well-groomed, well-developed  HEAD:  Atraumatic, Normocephalic  EYES: EOMI, PERRLA   NECK: Supple   NERVOUS SYSTEM:  Alert   CHEST/LUNG: Clear to auscultation bilaterally; No rales, rhonchi, wheezing, or rubs  HEART: Regular rate and rhythm; No murmurs, rubs, or gallops  ABDOMEN: Soft, Nontender, Nondistended; Bowel sounds present  EXTREMITIES:  No clubbing, cyanosis, or edema    LABS:    11-30    142  |  105  |  30<H>  ----------------------------<  101<H>  3.8   |  32<H>  |  0.95    Ca    9.3      30 Nov 2020 12:50  Phos  4.2     11-30  Mg     2.3     11-30

## 2020-11-30 NOTE — PROGRESS NOTE ADULT - NUTRITIONAL ASSESSMENT
This patient has been assessed with a concern for Malnutrition and has been determined to have a diagnosis/diagnoses of Moderate protein-calorie malnutrition.    This patient is being managed with:   Diet NPO after Midnight-     NPO Start Date: 09-Nov-2020   NPO Start Time: 23:59  Entered: Nov 9 2020 10:34AM    Diet Regular-  Supplement Feeding Modality:  Oral  Ensure Enlive Cans or Servings Per Day:  1       Frequency:  Two Times a day  Entered: Nov 6 2020  3:16PM    
This patient has been assessed with a concern for Malnutrition and has been determined to have a diagnosis/diagnoses of Moderate protein-calorie malnutrition.    This patient is being managed with:   Diet Regular-  Supplement Feeding Modality:  Oral  Ensure Enlive Cans or Servings Per Day:  1       Frequency:  Two Times a day  Entered: Nov 6 2020  3:16PM    
This patient has been assessed with a concern for Malnutrition and has been determined to have a diagnosis/diagnoses of Moderate protein-calorie malnutrition.    This patient is being managed with:   Diet Soft-  Low Sodium  Supplement Feeding Modality:  Oral  Ensure Enlive Cans or Servings Per Day:  2       Frequency:  Daily  Entered: Nov 18 2020  1:29PM    
This patient has been assessed with a concern for Malnutrition and has been determined to have a diagnosis/diagnoses of Moderate protein-calorie malnutrition.    This patient is being managed with:   Diet NPO after Midnight-     NPO Start Date: 09-Nov-2020   NPO Start Time: 23:59  Entered: Nov 9 2020 10:34AM    Diet Regular-  Supplement Feeding Modality:  Oral  Ensure Enlive Cans or Servings Per Day:  1       Frequency:  Two Times a day  Entered: Nov 6 2020  3:16PM    
This patient has been assessed with a concern for Malnutrition and has been determined to have a diagnosis/diagnoses of Moderate protein-calorie malnutrition.    This patient is being managed with:   Diet NPO after Midnight-     NPO Start Date: 09-Nov-2020   NPO Start Time: 23:59  Entered: Nov 9 2020 10:34AM    Diet Regular-  Supplement Feeding Modality:  Oral  Ensure Enlive Cans or Servings Per Day:  1       Frequency:  Two Times a day  Entered: Nov 6 2020  3:16PM    
This patient has been assessed with a concern for Malnutrition and has been determined to have a diagnosis/diagnoses of Moderate protein-calorie malnutrition.    This patient is being managed with:   Diet Regular-  Supplement Feeding Modality:  Oral  Ensure Enlive Cans or Servings Per Day:  1       Frequency:  Two Times a day  Entered: Nov 6 2020  3:16PM    
This patient has been assessed with a concern for Malnutrition and has been determined to have a diagnosis/diagnoses of Moderate protein-calorie malnutrition.    This patient is being managed with:   Diet Soft-  Low Sodium  Supplement Feeding Modality:  Oral  Ensure Enlive Cans or Servings Per Day:  2       Frequency:  Daily  Entered: Nov 18 2020  1:29PM    
This patient has been assessed with a concern for Malnutrition and has been determined to have a diagnosis/diagnoses of Moderate protein-calorie malnutrition.    This patient is being managed with:   Diet Regular-  Supplement Feeding Modality:  Oral  Ensure Enlive Cans or Servings Per Day:  1       Frequency:  Two Times a day  Entered: Nov 6 2020  3:16PM    
This patient has been assessed with a concern for Malnutrition and has been determined to have a diagnosis/diagnoses of Moderate protein-calorie malnutrition.    This patient is being managed with:   Diet Regular-  Supplement Feeding Modality:  Oral  Ensure Enlive Cans or Servings Per Day:  1       Frequency:  Two Times a day  Entered: Nov 6 2020  3:16PM

## 2020-12-01 VITALS
RESPIRATION RATE: 18 BRPM | HEART RATE: 67 BPM | TEMPERATURE: 98 F | SYSTOLIC BLOOD PRESSURE: 115 MMHG | OXYGEN SATURATION: 98 % | DIASTOLIC BLOOD PRESSURE: 70 MMHG

## 2020-12-01 LAB — T4 FREE SERPL-MCNC: 1.3 NG/DL — SIGNIFICANT CHANGE UP (ref 0.9–1.8)

## 2020-12-01 PROCEDURE — 99239 HOSP IP/OBS DSCHRG MGMT >30: CPT

## 2020-12-01 RX ORDER — ATORVASTATIN CALCIUM 80 MG/1
1 TABLET, FILM COATED ORAL
Qty: 0 | Refills: 0 | DISCHARGE

## 2020-12-01 RX ORDER — ASPIRIN/CALCIUM CARB/MAGNESIUM 324 MG
1 TABLET ORAL
Qty: 30 | Refills: 0
Start: 2020-12-01 | End: 2020-12-30

## 2020-12-01 RX ORDER — CARVEDILOL PHOSPHATE 80 MG/1
1 CAPSULE, EXTENDED RELEASE ORAL
Qty: 0 | Refills: 0 | DISCHARGE

## 2020-12-01 RX ORDER — DONEPEZIL HYDROCHLORIDE 10 MG/1
1 TABLET, FILM COATED ORAL
Qty: 30 | Refills: 0
Start: 2020-12-01 | End: 2020-12-30

## 2020-12-01 RX ORDER — DONEPEZIL HYDROCHLORIDE 10 MG/1
1 TABLET, FILM COATED ORAL
Qty: 0 | Refills: 0 | DISCHARGE

## 2020-12-01 RX ORDER — FUROSEMIDE 40 MG
1 TABLET ORAL
Qty: 30 | Refills: 0
Start: 2020-12-01 | End: 2020-12-30

## 2020-12-01 RX ORDER — ASPIRIN/CALCIUM CARB/MAGNESIUM 324 MG
1 TABLET ORAL
Qty: 0 | Refills: 0 | DISCHARGE

## 2020-12-01 RX ORDER — ATORVASTATIN CALCIUM 80 MG/1
1 TABLET, FILM COATED ORAL
Qty: 30 | Refills: 0
Start: 2020-12-01 | End: 2020-12-30

## 2020-12-01 RX ORDER — CARVEDILOL PHOSPHATE 80 MG/1
1 CAPSULE, EXTENDED RELEASE ORAL
Qty: 60 | Refills: 0
Start: 2020-12-01 | End: 2020-12-30

## 2020-12-01 RX ORDER — MEMANTINE HYDROCHLORIDE 10 MG/1
1 TABLET ORAL
Qty: 60 | Refills: 0
Start: 2020-12-01 | End: 2020-12-30

## 2020-12-01 RX ORDER — MEMANTINE HYDROCHLORIDE 10 MG/1
1 TABLET ORAL
Qty: 0 | Refills: 0 | DISCHARGE

## 2020-12-01 RX ORDER — FUROSEMIDE 40 MG
1 TABLET ORAL
Qty: 0 | Refills: 0 | DISCHARGE

## 2020-12-01 RX ADMIN — CARVEDILOL PHOSPHATE 3.12 MILLIGRAM(S): 80 CAPSULE, EXTENDED RELEASE ORAL at 05:46

## 2020-12-01 RX ADMIN — Medication 40 MILLIGRAM(S): at 05:46

## 2020-12-01 RX ADMIN — MEMANTINE HYDROCHLORIDE 10 MILLIGRAM(S): 10 TABLET ORAL at 05:46

## 2020-12-01 RX ADMIN — HEPARIN SODIUM 5000 UNIT(S): 5000 INJECTION INTRAVENOUS; SUBCUTANEOUS at 05:47

## 2020-12-01 RX ADMIN — CHLORHEXIDINE GLUCONATE 1 APPLICATION(S): 213 SOLUTION TOPICAL at 11:13

## 2020-12-01 RX ADMIN — Medication 81 MILLIGRAM(S): at 11:12

## 2020-12-01 RX ADMIN — Medication 1 PATCH: at 11:12

## 2020-12-01 RX ADMIN — POLYETHYLENE GLYCOL 3350 17 GRAM(S): 17 POWDER, FOR SOLUTION ORAL at 11:12

## 2020-12-01 NOTE — CHART NOTE - NSCHARTNOTESELECT_GEN_ALL_CORE
Malnutrition Notification
Event Note
Event Note/Bubble Study
Nutrition Services

## 2020-12-01 NOTE — CHART NOTE - NSCHARTNOTEFT_GEN_A_CORE
Nutrition follow-up note-    Pt adm w/dx of AMS, found w/CVA. PMHx includes dementia, HTN, DM. Met w/pt at bedside, Per chart review pt remains confused, unable to provide any answers. Per CNA on unit pt eats well most meals and is able to feed self. No N/V/D/C reported. No difficulty chewing reported, despite several missing teeth. Per chart D/C plan likely home w/arrangement of HHA (per Son's wishes).     Factors impacting intake: [ ] none [ ] nausea  [ ] vomiting [ ] diarrhea [ ] constipation  [ ]chewing problems [ ] swallowing issues  [x ] other: AMS      Diet Prescription: Diet, Soft:   Low Sodium  Supplement Feeding Modality:  Oral  Ensure Enlive Cans or Servings Per Day:  2       Frequency:  Daily (11-18-20 @ 13:30)    Intake: Variable PO intake noted, % most meals, occasionally less (20-25%).    Current Weight: (12/1) 53.8 kg, (11/5 admission) 51 kg.  % Weight Change: 5% gain (2.8 kg) x 26 days/since admission.    Physical appearance: no edema documented.  Nutrition focused physical exam conducted; Subcutaneous fat Exam;  [Moderate]  Orbital fat pads region,  [Unable- noted missing teeth] Buccal fat region,  [Moderate] triceps region, [Unable] ribs region.  Muscle Exam; [Moderate ] temples region, [Severe] clavicle region, [Severe] shoulder region, [Moderate] Interosseous region, [Moderate] thigh region, [Moderate] Calf region.    Pertinent Medications: MEDICATIONS  (STANDING):  aspirin  chewable 81 milliGRAM(s) Oral daily  atorvastatin 40 milliGRAM(s) Oral at bedtime  carvedilol 3.125 milliGRAM(s) Oral every 12 hours  chlorhexidine 4% Liquid 1 Application(s) Topical daily  donepezil 5 milliGRAM(s) Oral at bedtime  furosemide    Tablet 40 milliGRAM(s) Oral daily  heparin   Injectable 5000 Unit(s) SubCutaneous every 12 hours  melatonin 3 milliGRAM(s) Oral at bedtime  memantine 10 milliGRAM(s) Oral two times a day  nicotine -   7 mG/24Hr(s) Patch 1 patch Transdermal daily  polyethylene glycol 3350 17 Gram(s) Oral daily  senna 2 Tablet(s) Oral at bedtime    MEDICATIONS  (PRN):  magnesium hydroxide Suspension 30 milliLiter(s) Oral daily PRN Constipation    Pertinent Labs: 11-30 Na142 mmol/L Glu 101 mg/dL<H> K+ 3.8 mmol/L Cr  0.95 mg/dL BUN 30 mg/dL<H> 11-30 Phos 4.2 mg/dL 11-28 Alb 3.2 g/dL<L> 11-07 Chol 127 mg/dL LDL --    HDL 58 mg/dL Trig 50 mg/dL      Skin: intact, no pressure ulcers.    Estimated Needs:   [x ] no change since previous assessment (11/6)  [ ] recalculated:     Previous Nutrition Diagnosis:     Malnutrition Moderate malnutrition in context of chronic illness.   Etiology Inadequate energy/protein intake related to dementia, AMS.   Signs/Symptoms Physical findings of moderate/severe fat depletion & muscle wasting.   Goal/Expected Outcome Pt to consume >75% meals/supplements; GOAL not consistently met; maintain wt +/- 2# during hospitalization; GOAL met.    Nutrition Diagnosis is [x ] ongoing  [ ] resolved [ ] not applicable     New Nutrition Diagnosis: [x ] not applicable    [x] Continue current diet as Rx'd.  [ ] Change Diet To:  [ ] Nutrition Supplement  [ x] Nutrition Support: Continue current PO supplement, as Rx'd.  [ ] Other:       Monitoring and Evaluation:   [x ] PO intake [ x ] Tolerance to diet prescription [ x ] weights [ x ] labs[ x ] follow up per protocol  [ ] other:

## 2021-02-11 NOTE — CHART NOTE - NSCHARTNOTEFT_GEN_A_CORE
NEPHROLOGY / [facility] / [place]    ·  As our patient we look forward to providing you with the best quality of care and         helping to facilitate your needs.    · On your way out, you may schedule your next appointment at the reception desk where you checked in at, or you can call us back at either of the locations noted below, to schedule with Dr. Dakota Reed, in the Nephrology Dept. (kidney doctor)    · Your next appointment with Dr. Duncan is due in:3 months    · Your NON-FASTING LABS will be due few days prior to your next office visit. These orders will be entered in the system. You may present to any [facility] lab for your tests, no appointment is necessary *Results of these tests will then be given to you at your next scheduled office visit with     · For any tests that you may need, please see: \"Non-Medication Orders\"section  __________________________________________________________________________________________    *Appointment hours for Dr. Duncan at both locations below are 12:30pm to 3:50pm.  *Please note we are out of the office on Friday, Saturday, and Sunday    [facility]    (Tues., Wed., & Thurs.)  [address] [place] [address]   Phone: [phone]   Fax: [phone]    [facility]  (Monday's only)  [address], [address]  *[place] [address]  Phone: [phone]  Fax: [phone]    Additional Educational Resources:  For additional resources regarding your symptoms, diagnosis, or further health information, please visit the Health Resources section on [email or web address] or the Online Health Resources section in [email or web address].       Nutrition follow-up note-    Pt adm w/dx of AMS, found w/CVA. PMHx includes dementia, HTN, DM. Pt remains confused, spoke to CNA on unit. Per CNA pt eats well most meals and is able to feed self. No N/V/D/C reported. No difficulty chewing reported, despite several missing teeth. Per chart D/C plan likely home w/arrangement of HHA (per Son's wishes).     Factors impacting intake: [ ] none [ ] nausea  [ ] vomiting [ ] diarrhea [ ] constipation  [ ]chewing problems [ ] swallowing issues  [x ] other: AMS    Diet Prescription: Diet, Soft:   Low Sodium  Supplement Feeding Modality:  Oral  Ensure Enlive Cans or Servings Per Day:  2       Frequency:  Daily (11-18-20 @ 13:30)    Intake: % most meals. Noted PO intake improved to ~% intake last few days. Pt also consumes 50-75% PO supplement.    Current Weight: (11/24) 52.2 kg, (11/5 admission) 51 kg  % Weight Change: 2% gain (1.2 kg) x 19 days/since admission.    Physical appearance: no edema documented.  Nutrition focused physical exam conducted; Subcutaneous fat Exam;  [Moderate]  Orbital fat pads region,  [Unable- noted missing teeth] Buccal fat region,  [Moderate] triceps region, [Unable] ribs region.  Muscle Exam; [Moderate ] temples region, [Severe] clavicle region, [Severe] shoulder region, [Moderate] Interosseous region, [Moderate] thigh region, [Moderate] Calf region.    Pertinent Medications: MEDICATIONS  (STANDING):  aspirin  chewable 81 milliGRAM(s) Oral daily  atorvastatin 40 milliGRAM(s) Oral at bedtime  carvedilol 3.125 milliGRAM(s) Oral every 12 hours  chlorhexidine 4% Liquid 1 Application(s) Topical daily  donepezil 5 milliGRAM(s) Oral at bedtime  furosemide    Tablet 40 milliGRAM(s) Oral daily  heparin   Injectable 5000 Unit(s) SubCutaneous every 12 hours  melatonin 3 milliGRAM(s) Oral at bedtime  memantine 10 milliGRAM(s) Oral two times a day  nicotine -   7 mG/24Hr(s) Patch 1 patch Transdermal daily    MEDICATIONS  (PRN):    Pertinent Labs: 11-22 Na138 mmol/L Glu 85 mg/dL K+ 3.8 mmol/L Cr  0.82 mg/dL BUN 30 mg/dL<H> 11-18 Alb 3.2 g/dL<L> 11-07 Chol 127 mg/dL LDL --    HDL 58 mg/dL Trig 50 mg/dL      Skin: intact, no pressure ulcer.    Estimated Needs:   [x ] no change since previous assessment (11/6)  [ ] recalculated:     Previous Nutrition Diagnosis:     Malnutrition Moderate malnutrition in context of chronic illness.   Etiology Inadequate energy/protein intake related to dementia, AMS.   Signs/Symptoms Physical findings of moderate/severe fat depletion & muscle wasting.   Goal/Expected Outcome Pt to consume >75% meals/supplements; GOAL not consistently met; maintain wt +/- 2# during hospitalization; GOAL met     Nutrition Diagnosis is [x ] ongoing  [ ] resolved [ ] not applicable     New Nutrition Diagnosis: [x ] not applicable      Interventions:   Recommend:  [x] Continue current diet as Rx'd.  [ ] Change Diet To:  [ ] Nutrition Supplement  [ x] Nutrition Support: Continue current PO supplement, as Rx'd.  [ ] Other:     Monitoring and Evaluation:   [x ] PO intake [ x ] Tolerance to diet prescription [ x ] weights [ x ] labs[ x ] follow up per protocol  [ ] other:

## 2021-12-01 NOTE — ED ADULT NURSE NOTE - NS ED NOTE ABUSE SUSPICION NEGLECT YN
Daughter said she is still having nausea and needs the promethazine and zofran sent in she is out.  Orders entered for you to sign No

## 2023-01-01 NOTE — PHYSICAL THERAPY INITIAL EVALUATION ADULT - ADDITIONAL COMMENTS
Pt is confused, unable to accurately determine PLOF. Pt verbalized living with son in [place], [place]. Attempted to contact phone number on medical chart, unable to reach. Pt stated she does not own any device (such as rolling walker). no Pt is confused, unable to accurately determine PLOF. Pt verbalized living with son in [place], [place]. Attempted to contact phone number on medical chart, unable to reach. Pt stated she was independent and walks without any device.